# Patient Record
Sex: MALE | Race: WHITE | NOT HISPANIC OR LATINO | Employment: UNEMPLOYED | URBAN - METROPOLITAN AREA
[De-identification: names, ages, dates, MRNs, and addresses within clinical notes are randomized per-mention and may not be internally consistent; named-entity substitution may affect disease eponyms.]

---

## 2017-10-09 ENCOUNTER — APPOINTMENT (EMERGENCY)
Dept: RADIOLOGY | Facility: HOSPITAL | Age: 52
End: 2017-10-09
Payer: COMMERCIAL

## 2017-10-09 ENCOUNTER — HOSPITAL ENCOUNTER (INPATIENT)
Dept: RADIOLOGY | Facility: HOSPITAL | Age: 52
LOS: 3 days | Discharge: HOME | DRG: 045 | End: 2017-10-12
Attending: RADIOLOGY | Admitting: INTERNAL MEDICINE
Payer: COMMERCIAL

## 2017-10-09 ENCOUNTER — ANESTHESIA (OUTPATIENT)
Dept: RADIOLOGY | Facility: HOSPITAL | Age: 52
End: 2017-10-09

## 2017-10-09 ENCOUNTER — HOSPITAL ENCOUNTER (EMERGENCY)
Facility: HOSPITAL | Age: 52
End: 2017-10-09
Attending: EMERGENCY MEDICINE | Admitting: EMERGENCY MEDICINE
Payer: COMMERCIAL

## 2017-10-09 ENCOUNTER — APPOINTMENT (EMERGENCY)
Dept: CT IMAGING | Facility: HOSPITAL | Age: 52
End: 2017-10-09
Payer: COMMERCIAL

## 2017-10-09 ENCOUNTER — ANESTHESIA EVENT (OUTPATIENT)
Dept: RADIOLOGY | Facility: HOSPITAL | Age: 52
End: 2017-10-09

## 2017-10-09 VITALS
SYSTOLIC BLOOD PRESSURE: 157 MMHG | WEIGHT: 218.26 LBS | DIASTOLIC BLOOD PRESSURE: 90 MMHG | OXYGEN SATURATION: 96 % | RESPIRATION RATE: 16 BRPM | HEART RATE: 82 BPM

## 2017-10-09 DIAGNOSIS — I51.9 SYSTOLIC DYSFUNCTION: ICD-10-CM

## 2017-10-09 DIAGNOSIS — I63.419 CEREBROVASCULAR ACCIDENT (CVA) DUE TO EMBOLISM OF MIDDLE CEREBRAL ARTERY, UNSPECIFIED BLOOD VESSEL LATERALITY (HCC): ICD-10-CM

## 2017-10-09 DIAGNOSIS — R47.01 EXPRESSIVE APHASIA: ICD-10-CM

## 2017-10-09 DIAGNOSIS — I10 HYPERTENSION: ICD-10-CM

## 2017-10-09 DIAGNOSIS — I63.419: Primary | ICD-10-CM

## 2017-10-09 DIAGNOSIS — I63.9 STROKE (CEREBRUM) (HCC): Primary | ICD-10-CM

## 2017-10-09 DIAGNOSIS — Z95.1 S/P CABG (CORONARY ARTERY BYPASS GRAFT): Chronic | ICD-10-CM

## 2017-10-09 LAB
ABO GROUP BLD: NORMAL
ALBUMIN SERPL BCP-MCNC: 3.9 G/DL (ref 3.5–5)
ALP SERPL-CCNC: 114 U/L (ref 46–116)
ALT SERPL W P-5'-P-CCNC: 44 U/L (ref 12–78)
ANION GAP BLD CALC-SCNC: 16 MMOL/L (ref 4–13)
ANION GAP SERPL CALCULATED.3IONS-SCNC: 7 MMOL/L (ref 4–13)
APTT PPP: 29 SECONDS (ref 23–35)
AST SERPL W P-5'-P-CCNC: 22 U/L (ref 5–45)
ATRIAL RATE: 106 BPM
ATRIAL RATE: 96 BPM
BILIRUB SERPL-MCNC: 0.3 MG/DL (ref 0.2–1)
BLD GP AB SCN SERPL QL: NEGATIVE
BUN BLD-MCNC: 16 MG/DL (ref 5–25)
BUN SERPL-MCNC: 16 MG/DL (ref 5–25)
CA-I BLD-SCNC: 1.14 MMOL/L (ref 1.12–1.32)
CALCIUM SERPL-MCNC: 8.7 MG/DL (ref 8.3–10.1)
CHLORIDE BLD-SCNC: 106 MMOL/L (ref 100–108)
CHLORIDE SERPL-SCNC: 105 MMOL/L (ref 100–108)
CO2 SERPL-SCNC: 27 MMOL/L (ref 21–32)
CREAT BLD-MCNC: 0.9 MG/DL (ref 0.6–1.3)
CREAT SERPL-MCNC: 0.86 MG/DL (ref 0.6–1.3)
ERYTHROCYTE [DISTWIDTH] IN BLOOD BY AUTOMATED COUNT: 14.3 % (ref 11.6–15.1)
GFR SERPL CREATININE-BSD FRML MDRD: 101 ML/MIN/1.73SQ M
GFR SERPL CREATININE-BSD FRML MDRD: 99 ML/MIN/1.73SQ M
GLUCOSE SERPL-MCNC: 113 MG/DL (ref 65–140)
GLUCOSE SERPL-MCNC: 127 MG/DL (ref 65–140)
HCT VFR BLD AUTO: 47.3 % (ref 36.5–49.3)
HCT VFR BLD CALC: 50 % (ref 36.5–49.3)
HGB BLD-MCNC: 15.6 G/DL (ref 12–17)
HGB BLDA-MCNC: 17 G/DL (ref 12–17)
INR PPP: 0.83 (ref 0.86–1.16)
MCH RBC QN AUTO: 28.5 PG (ref 26.8–34.3)
MCHC RBC AUTO-ENTMCNC: 33 G/DL (ref 31.4–37.4)
MCV RBC AUTO: 86 FL (ref 82–98)
P AXIS: 29 DEGREES
P AXIS: 37 DEGREES
PCO2 BLD: 25 MMOL/L (ref 21–32)
PLATELET # BLD AUTO: 204 THOUSANDS/UL (ref 149–390)
PMV BLD AUTO: 11.5 FL (ref 8.9–12.7)
POTASSIUM BLD-SCNC: 4.1 MMOL/L (ref 3.5–5.3)
POTASSIUM SERPL-SCNC: 4.2 MMOL/L (ref 3.5–5.3)
PR INTERVAL: 178 MS
PROT SERPL-MCNC: 7.4 G/DL (ref 6.4–8.2)
PROTHROMBIN TIME: 11.6 SECONDS (ref 12.1–14.4)
QRS AXIS: -17 DEGREES
QRS AXIS: -24 DEGREES
QRSD INTERVAL: 108 MS
QRSD INTERVAL: 118 MS
QT INTERVAL: 342 MS
QT INTERVAL: 366 MS
QTC INTERVAL: 443 MS
QTC INTERVAL: 457 MS
RBC # BLD AUTO: 5.48 MILLION/UL (ref 3.88–5.62)
RH BLD: POSITIVE
SODIUM BLD-SCNC: 142 MMOL/L (ref 136–145)
SODIUM SERPL-SCNC: 139 MMOL/L (ref 136–145)
SPECIMEN EXPIRATION DATE: NORMAL
SPECIMEN SOURCE: ABNORMAL
SPECIMEN SOURCE: NORMAL
T WAVE AXIS: 74 DEGREES
T WAVE AXIS: 77 DEGREES
TROPONIN I BLD-MCNC: 0.04 NG/ML (ref 0–0.08)
TROPONIN I SERPL-MCNC: 0.03 NG/ML
VENTRICULAR RATE: 101 BPM
VENTRICULAR RATE: 94 BPM
WBC # BLD AUTO: 11.84 THOUSAND/UL (ref 4.31–10.16)

## 2017-10-09 PROCEDURE — C1894 INTRO/SHEATH, NON-LASER: HCPCS

## 2017-10-09 PROCEDURE — 80053 COMPREHEN METABOLIC PANEL: CPT | Performed by: EMERGENCY MEDICINE

## 2017-10-09 PROCEDURE — 85730 THROMBOPLASTIN TIME PARTIAL: CPT | Performed by: EMERGENCY MEDICINE

## 2017-10-09 PROCEDURE — 76937 US GUIDE VASCULAR ACCESS: CPT

## 2017-10-09 PROCEDURE — C1769 GUIDE WIRE: HCPCS

## 2017-10-09 PROCEDURE — 70450 CT HEAD/BRAIN W/O DYE: CPT

## 2017-10-09 PROCEDURE — 80047 BASIC METABLC PNL IONIZED CA: CPT

## 2017-10-09 PROCEDURE — 86900 BLOOD TYPING SEROLOGIC ABO: CPT | Performed by: EMERGENCY MEDICINE

## 2017-10-09 PROCEDURE — 93005 ELECTROCARDIOGRAM TRACING: CPT | Performed by: EMERGENCY MEDICINE

## 2017-10-09 PROCEDURE — B31RYZZ FLUOROSCOPY OF INTRACRANIAL ARTERIES USING OTHER CONTRAST: ICD-10-PCS | Performed by: RADIOLOGY

## 2017-10-09 PROCEDURE — 84484 ASSAY OF TROPONIN QUANT: CPT | Performed by: EMERGENCY MEDICINE

## 2017-10-09 PROCEDURE — 86850 RBC ANTIBODY SCREEN: CPT | Performed by: EMERGENCY MEDICINE

## 2017-10-09 PROCEDURE — 36415 COLL VENOUS BLD VENIPUNCTURE: CPT | Performed by: EMERGENCY MEDICINE

## 2017-10-09 PROCEDURE — 70498 CT ANGIOGRAPHY NECK: CPT

## 2017-10-09 PROCEDURE — 96374 THER/PROPH/DIAG INJ IV PUSH: CPT

## 2017-10-09 PROCEDURE — C2617 STENT, NON-COR, TEM W/O DEL: HCPCS

## 2017-10-09 PROCEDURE — 70496 CT ANGIOGRAPHY HEAD: CPT

## 2017-10-09 PROCEDURE — 84484 ASSAY OF TROPONIN QUANT: CPT

## 2017-10-09 PROCEDURE — 93005 ELECTROCARDIOGRAM TRACING: CPT

## 2017-10-09 PROCEDURE — 85610 PROTHROMBIN TIME: CPT | Performed by: EMERGENCY MEDICINE

## 2017-10-09 PROCEDURE — 86901 BLOOD TYPING SEROLOGIC RH(D): CPT | Performed by: EMERGENCY MEDICINE

## 2017-10-09 PROCEDURE — 85027 COMPLETE CBC AUTOMATED: CPT | Performed by: EMERGENCY MEDICINE

## 2017-10-09 PROCEDURE — 85014 HEMATOCRIT: CPT

## 2017-10-09 PROCEDURE — 99285 EMERGENCY DEPT VISIT HI MDM: CPT

## 2017-10-09 PROCEDURE — 36224 PLACE CATH CAROTD ART: CPT

## 2017-10-09 PROCEDURE — 71010 HB CHEST X-RAY 1 VIEW FRONTAL (PORTABLE): CPT

## 2017-10-09 RX ORDER — LABETALOL HYDROCHLORIDE 5 MG/ML
20 INJECTION, SOLUTION INTRAVENOUS ONCE
Status: COMPLETED | OUTPATIENT
Start: 2017-10-09 | End: 2017-10-09

## 2017-10-09 RX ORDER — PROPOFOL 10 MG/ML
INJECTION, EMULSION INTRAVENOUS AS NEEDED
Status: DISCONTINUED | OUTPATIENT
Start: 2017-10-09 | End: 2017-10-09 | Stop reason: SURG

## 2017-10-09 RX ORDER — ATORVASTATIN CALCIUM 40 MG/1
40 TABLET, FILM COATED ORAL EVERY EVENING
Status: DISCONTINUED | OUTPATIENT
Start: 2017-10-09 | End: 2017-10-10

## 2017-10-09 RX ORDER — NICOTINE 21 MG/24HR
1 PATCH, TRANSDERMAL 24 HOURS TRANSDERMAL DAILY
Status: DISCONTINUED | OUTPATIENT
Start: 2017-10-10 | End: 2017-10-12 | Stop reason: HOSPADM

## 2017-10-09 RX ORDER — SODIUM CHLORIDE, SODIUM LACTATE, POTASSIUM CHLORIDE, CALCIUM CHLORIDE 600; 310; 30; 20 MG/100ML; MG/100ML; MG/100ML; MG/100ML
INJECTION, SOLUTION INTRAVENOUS CONTINUOUS PRN
Status: DISCONTINUED | OUTPATIENT
Start: 2017-10-09 | End: 2017-10-09 | Stop reason: SURG

## 2017-10-09 RX ORDER — CHLORHEXIDINE GLUCONATE 0.12 MG/ML
15 RINSE ORAL EVERY 12 HOURS SCHEDULED
Status: DISCONTINUED | OUTPATIENT
Start: 2017-10-09 | End: 2017-10-12 | Stop reason: HOSPADM

## 2017-10-09 RX ADMIN — SODIUM CHLORIDE, SODIUM LACTATE, POTASSIUM CHLORIDE, AND CALCIUM CHLORIDE: .6; .31; .03; .02 INJECTION, SOLUTION INTRAVENOUS at 19:47

## 2017-10-09 RX ADMIN — LABETALOL 20 MG/4 ML (5 MG/ML) INTRAVENOUS SYRINGE 20 MG: at 18:00

## 2017-10-09 RX ADMIN — PROPOFOL 30 MG: 10 INJECTION, EMULSION INTRAVENOUS at 20:13

## 2017-10-09 RX ADMIN — PROPOFOL 30 MG: 10 INJECTION, EMULSION INTRAVENOUS at 20:15

## 2017-10-09 RX ADMIN — ALTEPLASE 80.2 MG: KIT at 18:25

## 2017-10-09 RX ADMIN — PROPOFOL 30 MG: 10 INJECTION, EMULSION INTRAVENOUS at 20:44

## 2017-10-09 NOTE — ED PROVIDER NOTES
History  Chief Complaint   Patient presents with   [address]     pt dropped his I pad around [phone]  Pt was not making sense to his wife  The patient is a 82-year-old male who presents 1 hour after developing slurred speech as well as mixing of his words  The wife notes that he also seemed confused  No chest pain, shortness of breath, fevers, chills, sweats  He has mild left-sided facial droop  No arm or leg weakness  No ataxia  He is unable to name certain objects when I point them out, he also has trouble following commands although he is alert  I suspect that his inability to follow commands is secondary to a receptive aphasia because he keeps saying that he does not understand that he is confused  I spoke with Interventional Radiology, critical care as well as Neurology, gave tPA, no contraindications, will transfer to [place] for interventional radiology  None       Past Medical History:   Diagnosis Date    Cardiac disease     Hx of CABG        History reviewed  No pertinent surgical history  History reviewed  No pertinent family history  I have reviewed and agree with the history as documented  Social History   Substance Use Topics    Smoking status: Current Every Day Smoker    Smokeless tobacco: Current User    Alcohol use Yes        Review of Systems   Constitutional: Negative for chills and fever  HENT: Negative for ear pain and hearing loss  Eyes: Positive for visual disturbance  Respiratory: Negative for chest tightness and shortness of breath  Cardiovascular: Negative for chest pain and leg swelling  Gastrointestinal: Negative for abdominal pain, diarrhea and nausea  Genitourinary: Negative for dysuria and hematuria  Musculoskeletal: Negative for joint swelling and neck stiffness  Skin: Negative for rash  Neurological: Positive for facial asymmetry and speech difficulty  Negative for seizures and headaches     Psychiatric/Behavioral: Negative for hallucinations and suicidal ideas  All other systems reviewed and are negative  Physical Exam  ED Triage Vitals [10/09/17 1744]   Temp Pulse Respirations Blood Pressure SpO2   -- 101 18 (!) 174/100 98 %      Temp src Heart Rate Source Patient Position - Orthostatic VS BP Location FiO2 (%)   -- Monitor Sitting Right arm --      Pain Score       No Pain           Physical Exam   Constitutional: He appears well-developed and well-nourished  HENT:   Head: Normocephalic and atraumatic  Eyes: EOM are normal  Pupils are equal, round, and reactive to light  Neck: Normal range of motion  Neck supple  Cardiovascular: Normal rate, regular rhythm and normal heart sounds  No murmur heard  Pulmonary/Chest: Effort normal and breath sounds normal  No respiratory distress  He has no wheezes  Abdominal: Soft  Bowel sounds are normal  He exhibits no distension  There is no tenderness  Musculoskeletal: Normal range of motion  He exhibits no edema or tenderness  Neurological: He is alert  Coordination normal    Left-sided facial droop, slight, he also has a expressive and receptive aphasia  Possible visual field defect although the patient is unable to comply with my testing to determine this completely  Skin: Skin is warm and dry  He is not diaphoretic  No erythema  Psychiatric: He has a normal mood and affect  His behavior is normal    Nursing note and vitals reviewed        ED Medications  Medications   labetalol (NORMODYNE) injection 20 mg (20 mg Intravenous Given 10/9/17 1800)   alteplase (ACTIVASE) bolus 8 9 mg (8 9 mg Intravenous Given 10/9/17 1820)     Followed by   alteplase (ACTIVASE) infusion 80 2 mg (80 2 mg Intravenous New Bag 10/9/17 1825)       Diagnostic Studies  Labs Reviewed   CBC AND PLATELET - Abnormal        Result Value Ref Range Status    WBC 11 84 (*) 4 31 - 10 16 Thousand/uL Final    RBC 5 48  3 88 - 5 62 Million/uL Final    Hemoglobin 15 6  12 0 - 17 0 g/dL Final    Hematocrit 47 3  36 5 - 49 3 % Final    MCV 86  82 - 98 fL Final    MCH 28 5  26 8 - 34 3 pg Final    MCHC 33 0  31 4 - 37 4 g/dL Final    RDW 14 3  11 6 - 15 1 % Final    Platelets 459  178 - 390 Thousands/uL Final    MPV 11 5  8 9 - 12 7 fL Final   PROTIME-INR - Abnormal     Protime 11 6 (*) 12 1 - 14 4 seconds Final    INR 0 83 (*) 0 86 - 1 16 Final   POCT CHEM 8+ - Abnormal     Anion Gap, Istat 16 (*) 4 - 13 mmol/L Final    Hct, i-STAT 50 (*) 36 5 - 49 3 % Final    SODIUM, I-STAT 142  136 - 145 mmol/l Final    Potassium, i-STAT 4 1  3 5 - 5 3 mmol/L Final    Chloride, istat 106  100 - 108 mmol/L Final    CO2, i-STAT 25  21 - 32 mmol/L Final    Calcium, Ionized i-STAT 1 14  1 12 - 1 32 mmol/L Final    BUN, I-STAT 16  5 - 25 mg/dl Final    Creatinine, i-STAT 0 9  0 6 - 1 3 mg/dl Final    eGFR 99  ml/min/1 73sq m Final    Glucose, i-STAT 113  65 - 140 mg/dl Final    Hgb, i-STAT 17 0  12 0 - 17 0 g/dl Final    Specimen Type VENOUS   Final   APTT - Normal    PTT 29  23 - 35 seconds Final    Narrative: Therapeutic Heparin Range = 60-90 seconds   TROPONIN I - Normal    Troponin I 0 03  <=0 04 ng/mL Final    Narrative:     [employer] Chemistry analyzer 99% cutoff is > 0 04 ng/mL in network labs    o cTnI 99% cutoff is useful only when applied to patients in the clinical setting of myocardial ischemia  o cTnI 99% cutoff should be interpreted in the context of clinical history, ECG findings and possibly cardiac imaging to establish correct diagnosis  o cTnI 99% cutoff may be suggestive but clearly not indicative of a coronary event without the clinical setting of myocardial ischemia     POCT TROPONIN - Normal    POC Troponin I 0 04  0 00 - 0 08 ng/ml Final    Specimen Type VENOUS   Final    Narrative:     [employer] i-Stat handheld analyzer 99% cutoff is > 0 08ng/mL in [facility] Dep[facility]    o cTnI 99% cutoff is useful only when applied to patients in the clinical setting of myocardial ischemia  o cTnI 99% cutoff should be interpreted in the context of clinical history, ECG findings and possibly cardiac imaging to establish correct diagnosis  o cTnI 99% cutoff may be suggestive but clearly not indicative of a coronary event without the clinical setting of myocardial ischemia  COMPREHENSIVE METABOLIC PANEL    Sodium 711  136 - 145 mmol/L Final    Potassium 4 2  3 5 - 5 3 mmol/L Final    Chloride 105  100 - 108 mmol/L Final    CO2 27  21 - 32 mmol/L Final    Anion Gap 7  4 - 13 mmol/L Final    BUN 16  5 - 25 mg/dL Final    Creatinine 0 86  0 60 - 1 30 mg/dL Final    Comment: Standardized to IDMS reference method    Glucose 127  65 - 140 mg/dL Final    Comment:   If the patient is fasting, the ADA then defines impaired fasting glucose as > 100 mg/dL and diabetes as > or equal to 123 mg/dL  Specimen collection should occur prior to Sulfasalazine administration due to the potential for falsely depressed results  Specimen collection should occur prior to Sulfapyridine administration due to the potential for falsely elevated results  Calcium 8 7  8 3 - 10 1 mg/dL Final    AST 22  5 - 45 U/L Final    Comment:   Specimen collection should occur prior to Sulfasalazine administration due to the potential for falsely depressed results  ALT 44  12 - 78 U/L Final    Comment:   Specimen collection should occur prior to Sulfasalazine administration due to the potential for falsely depressed results  Alkaline Phosphatase 114  46 - 116 U/L Final    Total Protein 7 4  6 4 - 8 2 g/dL Final    Albumin 3 9  3 5 - 5 0 g/dL Final    Total Bilirubin 0 30  0 20 - 1 00 mg/dL Final    eGFR 101  ml/min/1 73sq m Final    Narrative:     National Kidney Disease Education Program recommendations are as follows:  GFR calculation is accurate only with a steady state creatinine  Chronic Kidney disease less than 60 ml/min/1 73 sq  meters  Kidney failure less than 15 ml/min/1 73 sq  meters         CTA stroke alert (head/neck)   Final Result      Loss of contrast opacification, a short segment, within the proximal portion of the superior division of the left MCA (series 2 image 166)  Distal reconstitution is noted  Remainder of the intracranial vasculature appears intact with signs atherosclerotic disease  No hemodynamically significant stenosis within the cervical carotids by NASCET criteria  Patent bilateral cervical vertebral arteries  ##[id number]##imslh         Workstation performed: [id number]         CT stroke alert brain   Final Result      No acute intracranial hemorrhage or signs of a developing infarct  However, hyperdensity within the left insula may reflect atherosclerotic disease or thrombotic left M2 vessel  Needs correlation with CTA        Findings were directly discussed with Samir Cobian on 10/9/2017 5:55 PM          Workstation performed: [id number]         XR stroke alert portable chest    (Results Pending)       Procedures  CriticalCare Time  Performed by: Shy Dey  Authorized by: Shy Dey     Critical care provider statement:     Critical care time (minutes):  35    Critical care time was exclusive of:  Separately billable procedures and treating other patients and teaching time    Critical care was necessary to treat or prevent imminent or life-threatening deterioration of the following conditions:  CNS failure or compromise    Critical care was time spent personally by me on the following activities:  Blood draw for specimens, obtaining history from patient or surrogate, development of treatment plan with patient or surrogate, evaluation of patient's response to treatment, examination of patient, re-evaluation of patient's condition, ordering and review of laboratory studies, ordering and performing treatments and interventions, review of old charts, ordering and review of radiographic studies and discussions with consultants          Phone Contacts  ED Phone Contact    ED Course  ED Course as of Oct 09 2040   Mon Oct 09, 2017   1753 Speaking with teena    [phone] with Teena who spoke with Patricio, Likely symptomatic M2 occlusion, transfer to [place] and transfer to   On the phone with PACS right now  patricio  [phone]    1147 Spoke with Critical Care - will transfer to [place]              NIH Stroke Scale    Flowsheet Row Most Recent Value   Level of Consciousness (1a )  0 Filed at: 10/09/2017 1829   LOC Questions (1b )  0 Filed at: 10/09/2017 1829   LOC Commands (1c )  1 Filed at: 10/09/2017 1829   Best Gaze (2 )  0 Filed at: 10/09/2017 1829   Visual (3 )  1 Filed at: 10/09/2017 1829   Facial Palsy (4 )  1 Filed at: 10/09/2017 1829   Motor Arm, Left (5a )  0 Filed at: 10/09/2017 1829   Motor Arm, Right (5b )  0 Filed at: 10/09/2017 1829   Motor Leg, Left (6a )  0 Filed at: 10/09/2017 1829   Motor Leg, Right (6b )  0 Filed at: 10/09/2017 1829   Limb Ataxia (7 )  0 Filed at: 10/09/2017 1829   Sensory (8 )  0 Filed at: 10/09/2017 1829   Best Language (9 )  1 Filed at: 10/09/2017 1829   Dysarthria (10 )  1 Filed at: 10/09/2017 1829   Extinction and Inattention (11 ) (Formerly Neglect)  0 Filed at: 10/09/2017 1829   Total  5 Filed at: 10/09/2017 [address] Time    Disposition  Final diagnoses:   Stroke (cerebrum) ([address] )   Expressive aphasia     ED Disposition     ED Disposition Condition Comment    Transfer to Another [address],[facility] should be transferred out to [address] [facility]        MD Documentation    [facility] Most Recent Value   Patient Condition  The patient has been stabilized such that within reasonable medical probability, no material deterioration of the patient condition or the condition of the unborn child(shagufta) is likely to result from the transfer   Reason for Transfer  Level of Care needed not available at this facility [facility] intervention]   Benefits of Transfer  Specialized equipment and/or services available at the receiving facility (Include comment)________________________   Risks of Transfer  Potential for delay in receiving treatment, Potential deterioration of medical condition, Loss of IV, Possible worsening of condition or death during transfer, Increased discomfort during transfer   Accepting Physician  Venessa Guardado Name, [address] by (Company and Unit #)  Amanda He   Sending MD Mondragon Proud   Provider Certification  General risk, such as traffic hazards, adverse weather conditions, rough terrain or turbulence, possible failure of equipment (including vehicle or aircraft), or consequences of actions of persons outside the control of the transport personnel, Unanticipated needs of medical equipment and personnel during transport, Risk of worsening condition, The possibility of a transport vehicle being unavailable      RN Documentation    Flowsheet Row Most [address] Name, [address] [facility] Assignment  IR, then to ICU   Transported by [employer] and Unit #)  [facility]      Follow-up Information    None       There are no discharge medications for this patient  No discharge procedures on file      ED Provider  Electronically Signed by         Claude Lindau, MD  10/09/17 2176

## 2017-10-09 NOTE — EMTALA/ACUTE CARE TRANSFER
[address] [address] [address]  Dept: [phone]      EMTALA TRANSFER CONSENT    NAME Mariea Frankel                                         1965                              MRN [id number]    I have been informed of my rights regarding examination, treatment, and transfer   by Dr Yessi Miller MD    Benefits: Specialized equipment and/or services available at the receiving facility (Include comment)________________________    Risks: Potential for delay in receiving treatment, Potential deterioration of medical condition, Loss of IV, Possible worsening of condition or death during transfer, Increased discomfort during transfer      Transfer Request   I acknowledge that my medical condition has been evaluated and explained to me by the emergency department physician or other qualified medical person and/or my attending physician who has recommended and offered to me further medical examination and treatment  I understand the Hospital's obligation with respect to the treatment and stabilization of my emergency medical condition  I nevertheless request to be transferred  I release the Hospital, the doctor, and any other persons caring for me from all responsibility or liability for any injury or ill effects that may result from my transfer and agree to accept all responsibility for the consequences of my choice to transfer, rather than receive stabilizing treatment at the Hospital  I understand that because the transfer is my request, my insurance may not provide reimbursement for the services  The Hospital will assist and direct me and my family in how to make arrangements for transfer, but the hospital is not liable for any fees charged by the transport service    In spite of this understanding, I refuse to consent to further medical examination and treatment which has been offered to me, and request transfer to [address] Name, [address] [facility] [address]  I authorize the performance of emergency medical procedures and treatments upon me in both transit and upon arrival at the receiving facility  Additionally, I authorize the release of any and all medical records to the receiving facility and request they be transported with me, if possible  I authorize the performance of emergency medical procedures and treatments upon me in both transit and upon arrival at the receiving facility  Additionally, I authorize the release of any and all medical records to the receiving facility and request they be transported with me, if possible  I understand that the safest mode of transportation during a medical emergency is an ambulance and that the Hospital advocates the use of this mode of transport  Risks of traveling to the receiving facility by car, including absence of medical control, life sustaining equipment, such as oxygen, and medical personnel has been explained to me and I fully understand them  (MOUSTAPHA CORRECT BOX BELOW)  [  ]  I consent to the stated transfer and to be transported by ambulance/helicopter  [  ]  I consent to the stated transfer, but refuse transportation by ambulance and accept full responsibility for my transportation by car  I understand the risks of non-ambulance transfers and I exonerate the Hospital and its staff from any deterioration in my condition that results from this refusal     X___________________________________________    DATE  10/09/17  TIME________  Signature of patient or legally responsible individual signing on patient behalf           RELATIONSHIP TO PATIENT_________________________          Provider Certification    NAME Fanta Alvares                                        [facility] 1965                              MRN [id number]    A medical screening exam was performed on the above named patient    Based on the examination:    Condition Necessitating Transfer There were no encounter diagnoses  Patient Condition: The patient has been stabilized such that within reasonable medical probability, no material deterioration of the patient condition or the condition of the unborn child(shagufta) is likely to result from the transfer    Reason for Transfer: Level of Care needed not available at this facility (Neuro intervention)    Transfer Requirements: Facility [employer] available and qualified personnel available for treatment as acknowledged by    · Agreed to accept transfer and to provide appropriate medical treatment as acknowledged by       [employer]  · Appropriate medical records of the examination and treatment of the patient are provided at the time of transfer   [address] [facility] _______  · Transfer will be performed by qualified personnel from    and appropriate transfer equipment as required, including the use of necessary and appropriate life support measures      Provider Certification: I have examined the patient and explained the following risks and benefits of being transferred/refusing transfer to the patient/family:  General risk, such as traffic hazards, adverse weather conditions, rough terrain or turbulence, possible failure of equipment (including vehicle or aircraft), or consequences of actions of persons outside the control of the transport personnel, Unanticipated needs of medical equipment and personnel during transport, Risk of worsening condition, The possibility of a transport vehicle being unavailable      Based on these reasonable risks and benefits to the patient and/or the unborn child(shagufta), and based upon the information available at the time of the patients examination, I certify that the medical benefits reasonably to be expected from the provision of appropriate medical treatments at another medical facility outweigh the increasing risks, if any, to the individuals medical condition, and in the case of labor to the unborn child, from effecting the transfer      X____________________________________________ DATE 10/09/17        TIME_______      ORIGINAL - SEND TO MEDICAL RECORDS   COPY - SEND WITH PATIENT DURING TRANSFER

## 2017-10-09 NOTE — ANESTHESIA PREPROCEDURE EVALUATION
Review of Systems/Medical History  Patient summary reviewed    No history of anesthetic complications     Cardiovascular  Negative cardio ROS Exercise tolerance: good,  CAD, , History of CABG, Aortic disease (hx endovascular stent graft),    Pulmonary  Negative pulmonary ROS Smoker cigarette smoker more than 10 packs per year , ,        GI/Hepatic  Negative GI/hepatic ROS          Negative  ROS        Endo/Other  Negative endo/other ROS      GYN  Negative gynecology ROS          Hematology  Negative hematology ROS      Musculoskeletal  Negative musculoskeletal ROS        Neurology  Negative neurology ROS   CVA , acute,    Psychology   Negative psychology ROS            Physical Exam    Airway    Mallampati score: II  TM Distance: >3 FB  Neck ROM: full     Dental   No notable dental hx     Cardiovascular  Comment: Negative ROS,     Pulmonary      Other Findings        Anesthesia Plan  ASA Score- 3 Emergent      Anesthesia Type- IV sedation with anesthesia  Comment: I have seen the patient and reviewed the history  Patient to receive IV sedation with full ASA monitors  Risks discussed with the patient, consent signed          Induction- intravenous      Informed Consent  Anesthetic plan and risks discussed with patient

## 2017-10-09 NOTE — PROGRESS NOTES
Progress Note - Neurology   Magaly Woodward 50 y o  male MRN: [id number]  Unit/Bed#: ED 32 Encounter: [id number]    CVA Alert:  61-year-old male with onset what appears to be if Wernickes type aphasia at approximately 5:00 p m  is no clear sensory or motor deficits on exam, nor does there appear to be any gross neglect syndrome as per discussion with ED physician   - NIHSS 6  - CVA alert called 17:47  - neuro response 17:48    Initial CT head was unremarkable, CTA demonstrated diminished contrast opacification of at least one M2 branch on the left  - initial blood pressure was 196/99, which quickly dropped with the combination of labetalol and nicardipine  - decision was made to give IV tPA  - reviewed CTA with Dr Rita Degroot who was amenable to [facility] transfer for potential endovascular intervention   - goal SBP less than 185   - additional neurologic recommendations to follow

## 2017-10-10 ENCOUNTER — APPOINTMENT (INPATIENT)
Dept: NON INVASIVE DIAGNOSTICS | Facility: HOSPITAL | Age: 52
DRG: 045 | End: 2017-10-10
Payer: COMMERCIAL

## 2017-10-10 ENCOUNTER — GENERIC CONVERSION - ENCOUNTER (OUTPATIENT)
Dept: OTHER | Facility: OTHER | Age: 52
End: 2017-10-10

## 2017-10-10 ENCOUNTER — APPOINTMENT (INPATIENT)
Dept: RADIOLOGY | Facility: HOSPITAL | Age: 52
DRG: 045 | End: 2017-10-10
Payer: COMMERCIAL

## 2017-10-10 PROBLEM — R41.0 CONFUSION: Status: ACTIVE | Noted: 2017-10-10

## 2017-10-10 PROBLEM — I10 HYPERTENSION: Status: ACTIVE | Noted: 2017-10-10

## 2017-10-10 PROBLEM — R41.0 CONFUSION: Status: RESOLVED | Noted: 2017-10-10 | Resolved: 2017-10-10

## 2017-10-10 PROBLEM — I25.10 CAD (CORONARY ARTERY DISEASE): Status: ACTIVE | Noted: 2017-10-10

## 2017-10-10 PROBLEM — Z95.1 S/P CABG (CORONARY ARTERY BYPASS GRAFT): Chronic | Status: ACTIVE | Noted: 2017-10-10

## 2017-10-10 LAB
ALBUMIN SERPL BCP-MCNC: 3.4 G/DL (ref 3.5–5)
ALP SERPL-CCNC: 95 U/L (ref 46–116)
ALT SERPL W P-5'-P-CCNC: 33 U/L (ref 12–78)
ANION GAP SERPL CALCULATED.3IONS-SCNC: 7 MMOL/L (ref 4–13)
ANION GAP SERPL CALCULATED.3IONS-SCNC: 7 MMOL/L (ref 4–13)
APTT PPP: 30 SECONDS (ref 23–35)
AST SERPL W P-5'-P-CCNC: 16 U/L (ref 5–45)
ATRIAL RATE: 73 BPM
BASOPHILS # BLD AUTO: 0.03 THOUSANDS/ΜL (ref 0–0.1)
BASOPHILS NFR BLD AUTO: 0 % (ref 0–1)
BILIRUB SERPL-MCNC: 0.27 MG/DL (ref 0.2–1)
BUN SERPL-MCNC: 11 MG/DL (ref 5–25)
BUN SERPL-MCNC: 9 MG/DL (ref 5–25)
CALCIUM SERPL-MCNC: 8.1 MG/DL (ref 8.3–10.1)
CALCIUM SERPL-MCNC: 8.4 MG/DL (ref 8.3–10.1)
CHLORIDE SERPL-SCNC: 108 MMOL/L (ref 100–108)
CHLORIDE SERPL-SCNC: 109 MMOL/L (ref 100–108)
CHOLEST SERPL-MCNC: 216 MG/DL (ref 50–200)
CO2 SERPL-SCNC: 25 MMOL/L (ref 21–32)
CO2 SERPL-SCNC: 25 MMOL/L (ref 21–32)
CREAT SERPL-MCNC: 0.76 MG/DL (ref 0.6–1.3)
CREAT SERPL-MCNC: 0.78 MG/DL (ref 0.6–1.3)
EOSINOPHIL # BLD AUTO: 0.07 THOUSAND/ΜL (ref 0–0.61)
EOSINOPHIL NFR BLD AUTO: 1 % (ref 0–6)
ERYTHROCYTE [DISTWIDTH] IN BLOOD BY AUTOMATED COUNT: 14 % (ref 11.6–15.1)
ERYTHROCYTE [DISTWIDTH] IN BLOOD BY AUTOMATED COUNT: 14.1 % (ref 11.6–15.1)
GFR SERPL CREATININE-BSD FRML MDRD: 105 ML/MIN/1.73SQ M
GFR SERPL CREATININE-BSD FRML MDRD: 106 ML/MIN/1.73SQ M
GLUCOSE SERPL-MCNC: 112 MG/DL (ref 65–140)
GLUCOSE SERPL-MCNC: 123 MG/DL (ref 65–140)
HCT VFR BLD AUTO: 42.7 % (ref 36.5–49.3)
HCT VFR BLD AUTO: 44.6 % (ref 36.5–49.3)
HDLC SERPL-MCNC: 29 MG/DL (ref 40–60)
HGB BLD-MCNC: 14.8 G/DL (ref 12–17)
HGB BLD-MCNC: 14.9 G/DL (ref 12–17)
INR PPP: 1.03 (ref 0.86–1.16)
LDLC SERPL CALC-MCNC: 143 MG/DL (ref 0–100)
LYMPHOCYTES # BLD AUTO: 3.59 THOUSANDS/ΜL (ref 0.6–4.47)
LYMPHOCYTES NFR BLD AUTO: 27 % (ref 14–44)
MAGNESIUM SERPL-MCNC: 2.2 MG/DL (ref 1.6–2.6)
MAGNESIUM SERPL-MCNC: 2.3 MG/DL (ref 1.6–2.6)
MCH RBC QN AUTO: 29.1 PG (ref 26.8–34.3)
MCH RBC QN AUTO: 29.9 PG (ref 26.8–34.3)
MCHC RBC AUTO-ENTMCNC: 33.2 G/DL (ref 31.4–37.4)
MCHC RBC AUTO-ENTMCNC: 34.9 G/DL (ref 31.4–37.4)
MCV RBC AUTO: 86 FL (ref 82–98)
MCV RBC AUTO: 88 FL (ref 82–98)
MONOCYTES # BLD AUTO: 1.02 THOUSAND/ΜL (ref 0.17–1.22)
MONOCYTES NFR BLD AUTO: 8 % (ref 4–12)
NEUTROPHILS # BLD AUTO: 8.35 THOUSANDS/ΜL (ref 1.85–7.62)
NEUTS SEG NFR BLD AUTO: 64 % (ref 43–75)
NRBC BLD AUTO-RTO: 0 /100 WBCS
P AXIS: 28 DEGREES
PHOSPHATE SERPL-MCNC: 2.8 MG/DL (ref 2.7–4.5)
PLATELET # BLD AUTO: 194 THOUSANDS/UL (ref 149–390)
PLATELET # BLD AUTO: 202 THOUSANDS/UL (ref 149–390)
PMV BLD AUTO: 11.3 FL (ref 8.9–12.7)
PMV BLD AUTO: 11.9 FL (ref 8.9–12.7)
POTASSIUM SERPL-SCNC: 3.8 MMOL/L (ref 3.5–5.3)
POTASSIUM SERPL-SCNC: 3.9 MMOL/L (ref 3.5–5.3)
PR INTERVAL: 196 MS
PROT SERPL-MCNC: 6.8 G/DL (ref 6.4–8.2)
PROTHROMBIN TIME: 13.5 SECONDS (ref 12.1–14.4)
QRS AXIS: -12 DEGREES
QRSD INTERVAL: 100 MS
QT INTERVAL: 413 MS
QTC INTERVAL: 455 MS
RBC # BLD AUTO: 4.99 MILLION/UL (ref 3.88–5.62)
RBC # BLD AUTO: 5.09 MILLION/UL (ref 3.88–5.62)
SODIUM SERPL-SCNC: 140 MMOL/L (ref 136–145)
SODIUM SERPL-SCNC: 141 MMOL/L (ref 136–145)
T WAVE AXIS: 87 DEGREES
TRIGL SERPL-MCNC: 220 MG/DL
VENTRICULAR RATE: 73 BPM
WBC # BLD AUTO: 12.56 THOUSAND/UL (ref 4.31–10.16)
WBC # BLD AUTO: 13.09 THOUSAND/UL (ref 4.31–10.16)

## 2017-10-10 PROCEDURE — 80061 LIPID PANEL: CPT | Performed by: EMERGENCY MEDICINE

## 2017-10-10 PROCEDURE — 70450 CT HEAD/BRAIN W/O DYE: CPT

## 2017-10-10 PROCEDURE — G8988 SELF CARE GOAL STATUS: HCPCS

## 2017-10-10 PROCEDURE — 85027 COMPLETE CBC AUTOMATED: CPT | Performed by: EMERGENCY MEDICINE

## 2017-10-10 PROCEDURE — 83735 ASSAY OF MAGNESIUM: CPT | Performed by: EMERGENCY MEDICINE

## 2017-10-10 PROCEDURE — 93306 TTE W/DOPPLER COMPLETE: CPT

## 2017-10-10 PROCEDURE — G8979 MOBILITY GOAL STATUS: HCPCS

## 2017-10-10 PROCEDURE — 92523 SPEECH SOUND LANG COMPREHEN: CPT

## 2017-10-10 PROCEDURE — G8978 MOBILITY CURRENT STATUS: HCPCS

## 2017-10-10 PROCEDURE — 80048 BASIC METABOLIC PNL TOTAL CA: CPT | Performed by: EMERGENCY MEDICINE

## 2017-10-10 PROCEDURE — 97163 PT EVAL HIGH COMPLEX 45 MIN: CPT

## 2017-10-10 PROCEDURE — 85025 COMPLETE CBC W/AUTO DIFF WBC: CPT | Performed by: EMERGENCY MEDICINE

## 2017-10-10 PROCEDURE — 85610 PROTHROMBIN TIME: CPT | Performed by: EMERGENCY MEDICINE

## 2017-10-10 PROCEDURE — 97166 OT EVAL MOD COMPLEX 45 MIN: CPT

## 2017-10-10 PROCEDURE — 85730 THROMBOPLASTIN TIME PARTIAL: CPT | Performed by: EMERGENCY MEDICINE

## 2017-10-10 PROCEDURE — 80053 COMPREHEN METABOLIC PANEL: CPT | Performed by: EMERGENCY MEDICINE

## 2017-10-10 PROCEDURE — G8987 SELF CARE CURRENT STATUS: HCPCS

## 2017-10-10 PROCEDURE — 84100 ASSAY OF PHOSPHORUS: CPT | Performed by: EMERGENCY MEDICINE

## 2017-10-10 RX ORDER — HEPARIN SODIUM 5000 [USP'U]/ML
5000 INJECTION, SOLUTION INTRAVENOUS; SUBCUTANEOUS EVERY 8 HOURS SCHEDULED
Status: DISCONTINUED | OUTPATIENT
Start: 2017-10-10 | End: 2017-10-12 | Stop reason: HOSPADM

## 2017-10-10 RX ORDER — ASPIRIN 81 MG/1
81 TABLET, CHEWABLE ORAL DAILY
Status: DISCONTINUED | OUTPATIENT
Start: 2017-10-11 | End: 2017-10-12 | Stop reason: HOSPADM

## 2017-10-10 RX ORDER — ATORVASTATIN CALCIUM 80 MG/1
80 TABLET, FILM COATED ORAL EVERY EVENING
Status: DISCONTINUED | OUTPATIENT
Start: 2017-10-10 | End: 2017-10-12 | Stop reason: HOSPADM

## 2017-10-10 RX ORDER — AMLODIPINE BESYLATE 5 MG/1
5 TABLET ORAL DAILY
Status: DISCONTINUED | OUTPATIENT
Start: 2017-10-10 | End: 2017-10-10

## 2017-10-10 RX ADMIN — IODIXANOL 24 ML: 320 INJECTION, SOLUTION INTRAVASCULAR at 11:11

## 2017-10-10 RX ADMIN — ATORVASTATIN CALCIUM 80 MG: 80 TABLET, FILM COATED ORAL at 18:48

## 2017-10-10 RX ADMIN — PERFLUTREN 0.8 ML/MIN: 6.52 INJECTION, SUSPENSION INTRAVENOUS at 12:19

## 2017-10-10 RX ADMIN — METOPROLOL TARTRATE 12.5 MG: 25 TABLET ORAL at 12:26

## 2017-10-10 RX ADMIN — NICOTINE 1 PATCH: 21 PATCH, EXTENDED RELEASE TRANSDERMAL at 09:37

## 2017-10-10 RX ADMIN — METOPROLOL TARTRATE 12.5 MG: 25 TABLET ORAL at 20:49

## 2017-10-10 NOTE — PHYSICAL THERAPY NOTE
Physical Therapy Evaluation    Patient's Name: Juliann Camejo    Admitting Diagnosis  Cerebrovascular accident (CVA) due to embolism of middle cerebral artery, unspecified blood vessel laterality ([facility] [address] ) [I63 [id number]]    Problem List  Patient Active Problem List   Diagnosis    Cerebrovascular accident (CVA) due to embolism of middle cerebral artery ([facility] [facility] )    Hypertension       Past Medical History  Past Medical History:   Diagnosis Date    Cardiac disease     Hx of CABG        Past Surgical History  History reviewed  No pertinent surgical history  10/10/17 1145   Note Type   Note type Eval only   Pain Assessment   Pain Assessment 0-10   Pain Score No Pain   Home Living   Type of Home House   Additional Comments Resides w/ wife, children in multilevel home  Normally indep self care, works FT   ambulates w/ out device  Prior Function   Level of [place] Independent with ADLs and functional mobility   Falls in the last 6 months 0   Restrictions/Precautions   Weight Bearing Precautions Per Order No   Other Precautions Cognitive; Chair Alarm; Bed Alarm;Multiple lines;Telemetry; Fall Risk   General   Family/Caregiver Present No   Cognition   Overall Cognitive Status Impaired   Arousal/Participation Responsive   Orientation Level Oriented X4   Memory Unable to assess   Following Commands Follows one step commands without difficulty   Comments pt w/ some word finding and receptive issues       RLE Assessment   RLE Assessment (strength 4/5)   LLE Assessment   LLE Assessment (strength 4/5)   Coordination   Movements are Fluid and Coordinated 0   Coordination and Movement Description mild ataxia   Bed Mobility   Sit to Supine 5  Supervision   Additional items Assist x 1   Transfers   Sit to Stand 4  Minimal assistance   Additional items Assist x 1   Stand to Sit 5  Supervision   Additional items Assist x 1   Ambulation/Elevation   Gait pattern (mild ataxia)   Gait Assistance 4  Minimal assist   Additional items Assist x 1   Assistive Device None   Distance 7'x1 from chair to bed- further distance deferred as about to have bedside echo   Balance   Static Sitting Good   Dynamic Sitting Fair   Static Standing Fair -   Dynamic Standing Fair -   Ambulatory Fair -   Endurance Deficit   Endurance Deficit Yes   Activity Tolerance   Activity Tolerance Treatment limited secondary to medical complications (Comment)   Nurse Made Aware yes   Assessment   Prognosis Good   Problem List Decreased strength;Decreased endurance; Impaired balance;Decreased mobility; Decreased coordination;Decreased cognition  (expressive and receptive issues)   Assessment Pt seen for high complexity physical therapy evaluation  Pt is a 51 y/o male w/ hostory/comorbidities of CAD, CABG who is now admitted as a transfer from [place] w  slurred speech, dysarthria, L facial droop, word finding issues, unsteady gait     Found to have acute L MCA- transferred here and had tpa w/ attempted endovascular clot retrieval   Given need for ICU monitoring w/ continued expressive and receptive issues, need for nursing 2:1 monitoring, note unstable clinical picture  PT consulted to assess mobility, d/c needs     Pt presents w/ decreased functional mob, standing balance, endurance, B LE strength and coordination, barriers at home  will benefit from skilled PT to correct for the above problems  Will follow for d/c needs- issues are more cog/communication in nature, anticipate home w/ outpatient therapies   Goals   Patient Goals none stated   STG Expiration Date 10/24/17   Short Term Goal #1 1-2 wks: Bed mob and transfers w/ indep, standing balance to good/normal dynamically, ambulate 200-300 ft w indep, ambulate 1 flight of stairs w/ indep   Treatment Day 0   Plan   Treatment/Interventions Functional transfer training;Elevations;LE strengthening/ROM; Therapeutic exercise; Endurance training;Patient/family training;Equipment eval/education; Bed mobility;Gait training   PT Frequency 5x/wk  (and 1x/weekend)   Recommendation   Recommendation (recommend home w/ home therapies but will follow)   PT - OK to Discharge (? home w/ outpatient therapies)   Modified Sofia Scale   Modified [place] Scale 4   Barthel Index   Feeding 10   Bathing 0   Grooming Score 5   Dressing Score 5   Bladder Score 10   Bowels Score 10   Toilet Use Score 5   Transfers (Bed/Chair) Score 10   Mobility (Level Surface) Score 0   Stairs Score 0   Barthel Index Score 55       Ian Longoria PT, DPT, CSRS

## 2017-10-10 NOTE — PLAN OF CARE
Problem: OCCUPATIONAL THERAPY ADULT  Goal: Performs self-care activities at highest level of function for planned discharge setting  See evaluation for individualized goals  Treatment Interventions: ADL retraining, Functional transfer training, UE strengthening/ROM, Cognitive reorientation, Endurance training, Patient/family training, Equipment evaluation/education, Compensatory technique education, Neuromuscular reeducation, Continued evaluation, Energy conservation, Activityengagement          See flowsheet documentation for full assessment, interventions and recommendations  Outcome: Progressing  Limitation: Decreased Safe judgement during ADL, Decreased cognition, Decreased high-level ADLs, Decreased ADL status  Prognosis: Good  Assessment: Pt is pleasant, employed full time 51 y/o male seen for OT eval s/p adm to [facility] s/p tfer frmo SLT, initially presented w/ altered mental status, slurred speech, dysarthria, confusion, mild L sided facial droop expressive/receptive aphasia, received TPA and was ultimately transferred to [facility] for IR intervention I e  Cerebral angio 10/9/2017, IR noted there to be a L MCA and 3 occlusion though no embolectomy was performed and pt was brought to the neuro ICU for monitoring, dx'd w/  L MCA CVA s/p TPA CTB: (-) acute, MRIB ordered and P, comorbidities include a h/o CABG, cardiac disease,   Pt was I w/  I/ADLS, drove, & required no use of DME PTA, pt lives w/ wife and 2 children in [place] in a [address] farm house w/ first floor bedroom/bathroom w/ 2nd floor full setup as well w/ 1 ANDERSON from the front vs 5 ANDERSON from the garage  Pt is currently demonstrating the following occupational deficits: limited 2* difficulty w/ word finding/language fluency, object agnosia, expressive<receptive aphasia, delayed processing, decreased attention/concentration to task, impulsive, minimizes impairments, decreased safety insight judgment and awareness into deficits   Pt requires overall SBA for ADLs/self care and SBA for fx'l mobility w/o DME/LOB  The following Occupational Performance Areas to address include: bathing/shower, toilet hygiene, dressing, medication management, socialization, health maintenance, functional mobility, community mobility, clothing management, cleaning, meal prep, money management, household maintenance, care of children, care of pets, job performance/volunteering and social participation  Pt scored overall 55/100 on the Barthel Index and 4/6 on the Modified Sofia Scale  Based on the aforementioned OT evaluation, functional performance deficits, and assessments, pt has been identified as a moderate complexity evaluation  Recommend outpt OT/SLP  Pt to continue to benefit from acute immediate OT services to address the following goals 3-5x/wk to  w/in 7-10 days:     OT Discharge Recommendation: Outpatient OT (potentially outpt SLP though defer to SLP)  OT - OK to Discharge: Yes (potentially outpt SLP though defer to SLP)    Thank you for the consult!  Please call if you have any questions: Radha Garcia, MLADONADO, OTR/L, C-GCM, CSRS  Ne[facility]

## 2017-10-10 NOTE — H&P
History and Physical - Critical Care   Jinny Hooks 50 y o  male MRN: [id number]  Unit/Bed#: ICU 13 Encounter: [id number]    Reason for Admission / Chief Complaint:  Ischemic Stroke    History of Present Illness:  Jinny Hooks is a 50 y o  male who presents as a transfer from [facility] for ischemic stroke  Per chart review patient acutely became altered at around 17:00 he started having slurred speech and some dysarthria, he seemed more confused according to the wife who witnessed the event  He had a mild left-sided facial droop on presentation to the ED  He had some receptive aphasia and was unable to name common objects  Upon consultation with Neurology, Interventional Radiology tPA was administered and patient was transferred to [place] for IR intervention  Per IR note there was left MCA and 3 occlusion though no embolectomy was performed  and patient was brought to the neuro ICU  for monitoring  On arrival patient is mildly confused, slow to answer but otherwise nonfocal   Neurologic exam, able to follow commands  History obtained from chart review and the patient  Past Medical History:  Past Medical History:   Diagnosis Date    Cardiac disease     Hx of CABG        Past Surgical History:  History reviewed  No pertinent surgical history  Past Family History:  History reviewed  No pertinent family history      Social History:  History   Smoking Status    Current Every Day Smoker   Smokeless Tobacco    Current User     History   Alcohol Use    Yes     History   Drug Use No     Marital Status: /Civil Union      Medications:  Current Facility-Administered Medications   Medication Dose Route Frequency    atorvastatin (LIPITOR) tablet 40 mg  40 mg Oral QPM    chlorhexidine (PERIDEX) 0 12 % oral rinse 15 mL  15 mL Swish & Spit Q12H [address]    nicotine (NICODERM CQ) 21 mg/24 hr TD 24 hr patch 1 patch  1 patch Transdermal Daily     Home medications:  Prior to Admission medications Not on File     Allergies: Allergies   Allergen Reactions    Penicillins        ROS:   Review of Systems   Neurological: Positive for headaches  Confusion, trouble speaking   All other systems reviewed and are negative  Vitals:  Vitals:    10/09/17 2230 10/09/17 2245 10/09/17 2300 10/09/17 2330   BP: 158/84 152/80 161/83 157/86   Pulse: 80 76 76 72   Resp:  17   Temp:       SpO2: 95% 95%       Temperature:   Temp (24hrs), Av 2 °F (37 3 °C), Min:99 2 °F (37 3 °C), Max:99 2 °F (37 3 °C)    Current Temperature: 99 2 °F (37 3 °C)    Weights: There is no height or weight on file to calculate BMI  Hemodynamic Monitoring:  N/A     Non-Invasive/Invasive Ventilation Settings:  Respiratory    Lab Data (Last 4 hours)    None         O2/Vent Data (Last 4 hours)    None              No results found for: PHART, SFW5VPQ, PO2ART, UEB6DVV, Z6OPCWEK, BEART, SOURCE  SpO2: SpO2: 95 %     Physical Exam:  Physical Exam   Constitutional: He is oriented to person, place, and time  He appears well-developed and well-nourished  No distress  HENT:   Head: Normocephalic and atraumatic  Right Ear: External ear normal    Left Ear: External ear normal    Nose: Nose normal    Mouth/Throat: Oropharynx is clear and moist    Eyes: EOM are normal  Pupils are equal, round, and reactive to light  Right eye exhibits no discharge  Left eye exhibits no discharge  Neck: Normal range of motion  Neck supple  No JVD present  No tracheal deviation present  No thyromegaly present  Cardiovascular: Normal rate, regular rhythm and normal heart sounds  Exam reveals no gallop and no friction rub  No murmur heard  Pulmonary/Chest: Effort normal and breath sounds normal  No stridor  No respiratory distress  He has no wheezes  He has no rales  He exhibits no tenderness  Abdominal: Soft  Bowel sounds are normal  He exhibits no distension  There is no tenderness  There is no rebound and no guarding     Musculoskeletal: Normal range of motion  He exhibits no edema or deformity  Neurological: He is alert and oriented to person, place, and time  No cranial nerve deficit  Coordination normal    MS 5/5 B/L upper and lower extremity, GCS 15   Skin: He is not diaphoretic  Nursing note and vitals reviewed  Labs:    Results from last 7 days  Lab Units 10/10/17  0058 10/09/17  1749 10/09/17  1747   WBC Thousand/uL 12 56*  --  11 84*   HEMOGLOBIN g/dL 14 9  --  15 6   I STAT HEMOGLOBIN g/dl  --  17 0  --    HEMATOCRIT % 42 7  --  47 3   PLATELETS Thousands/uL 194  --  204        Results from last 7 days  Lab Units 10/09/17  1749 10/09/17  1747   SODIUM mmol/L  --  139   POTASSIUM mmol/L  --  4 2   CHLORIDE mmol/L  --  105   CO2 mmol/L  --  27   BUN mg/dL  --  16   CREATININE mg/dL  --  0 86   CALCIUM mg/dL  --  8 7   TOTAL PROTEIN g/dL  --  7 4   BILIRUBIN TOTAL mg/dL  --  0 30   ALK PHOS U/L  --  114   ALT U/L  --  44   AST U/L  --  22   GLUCOSE RANDOM mg/dL  --  127   GLUCOSE, ISTAT mg/dl 113  --                 Results from last 7 days  Lab Units 10/10/17  0058 10/09/17  1747   INR  1 03 0 83*   PTT seconds 30 29           0  Lab Value Date/Time   TROPONINI 0 03 10/09/2017 1747       Imaging:   10/09- CT head - No acute intracranial hemorrhage or signs of a developing infarct  However, hyperdensity within the left insula may reflect atherosclerotic disease or thrombotic left M2 vessel  Needs correlation with CTA  10/09- CTA Head - Loss of contrast opacification, a short segment, within the proximal portion of the superior division of the left MCA (series 2 image 166)  Distal reconstitution is noted  Remainder of the intracranial vasculature appears intact with signs atherosclerotic disease  10/09 - IR Angiography I have personally reviewed pertinent reports  EKG:   Pending This was personally reviewed by myself     Micro:  No results found for: Mulu Escamilla Sharonda Del Angel    ______________________________________________________________________    Assessment:   Patient Active Problem List   Diagnosis    Cerebrovascular accident (CVA) due to embolism of middle cerebral artery ([facility] [address] )    Hypertension         Plan:   Patient Active Problem List   Diagnosis    Cerebrovascular accident (CVA) due to embolism of middle cerebral artery ([facility] [address] )    Hypertension        Neuro: Ischemic Stroke, S/p TPA, neuro checks per protocol  Will get repeat CT 24 hours post TPA, will get MRI, neurology consulted, start statin      CV: Hypertension - hold antihypertensive medication in setting of acute ischemic stroke      Pulm: No acute issue     GI: Passed Bedside swallow, diet started     : No acute issues, I/Os     F/E/N: Replete Electrolytes as needed, no IVF as patient has a diet     ID: No acute issues     Heme: No acute issues, check CBC in AM     Endo: No acute issues     Msk/Skin: No acute issues, PT/OT eval      Proph: No SQH as patient is s/p tPA, SCDs     Disposition: Admit to ICU        ______________________________________________________________________    VTE Pharmacologic Prophylaxis: s/p TPA  VTE Mechanical Prophylaxis: sequential compression device    Invasive lines and devices: Invasive Devices     Peripheral Intravenous Line            Peripheral IV 10/09/17 Left Antecubital less than 1 day    Peripheral IV 10/09/17 Right Antecubital less than 1 day                Code Status: Level 1 - Full Code  POA:    POLST:      Given critical illness, patient length of stay will require greater than two midnights  Portions of the record may have been created with voice recognition software  Occasional wrong word or "sound a like" substitutions may have occurred due to the inherent limitations of voice recognition software  Read the chart carefully and recognize, using context, where substitutions have occurred        Ivonne Pike MD

## 2017-10-10 NOTE — PROGRESS NOTES
Transfer Progress Note   - ICU Transfer to Step down/med  surg  -   Linard Pati 50 y o  male MRN: [id number]  Unit/Bed#: ICU 13 Encounter: [id number]      Code Status: Level 1 - Full Code    Date of ICU admission: 10/9/2017    Reason for ICU adm: Cerebrovascular accident (CVA) due to embolism of middle cerebral artery, unspecified blood vessel laterality ([facility] [address] ) [I63 419]    Active problems:   Patient Active Problem List   Diagnosis    Cerebrovascular accident (CVA) due to embolism of middle cerebral artery ([facility] [address] )    Hypertension    CAD (coronary artery disease)    S/P CABG (coronary artery bypass graft)       Consultants: neurology, neurosurgery    Summary of clinical course:  Patient is 50years old male with PMH of HTN, CAD was hospitalized at [facility] as a transfer from [address] [place] [address] for ischemic stroke  Patient became altered, started to have slurred speech, dysarthria and confusion  CT of head showed CVA of left MCA  TPA was administered and patient was transferred for IR intervention  Per IR note there was left MCA and 3 occlusion though no embolectomy was performed and patient was brought to the neuro ICU for monitoring  Patient's mental status improved, he is AAOx3  Patient was started on lipitor and lopressor  Control head CT showed Evolving infarct in the left lateral temporal lobe  No evidence for space-occupying hematoma or mass effect  I spoke to Dr Sakshi Salas  Patient will be transferred to [facility] floor  Recent or scheduled procedures:     Recent diagnostics: CT stroke alert brain - No acute intracranial hemorrhage or signs of a developing infarct  However, hyperdensity within the left insula may reflect atherosclerotic disease or thrombotic left M2 vessel  Needs correlation with CTA  CTA stroke alert - Loss of contrast opacification, a short segment, within the proximal portion of the superior division of the left MCA  Distal reconstitution is noted   Remainder of the intracranial vasculature appears intact with signs atherosclerotic disease  No hemodynamically significant stenosis within the cervical carotids by NASCET criteria  Patent bilateral cervical vertebral arteries  XR stroke alert chest - No acute abnormality in the chest     IR cerebral angiography/intervention - Interval distal propagation of previously seen left MCA M2 thrombus now seen in a M3 branch  No thrombectomy was performed due to the distal occlusion and minimal symptoms  Echo of heart - The ventricle was moderately dilated  Systolic function was severely reduced  Ejection fraction was estimated to be 20 %  There was severe diffuse hypokinesis  There was mild concentric hypertrophy  Diastolic dysfunction with normal LV filling pressures  No evidence of apical thrombus  CT head wo contrast - Evolving infarct in the left lateral temporal lobe  No evidence for space-occupying hematoma or mass effect        Nutrition Plan: cardiac diet      Katy Vaughn DO  PGY 2 resident

## 2017-10-10 NOTE — CONSULTS
Consultation - Neurology   Cecelia Torres 50 y o  male MRN: [id number]  Unit/Bed#: ICU 13 Encounter: [id number]      Physician Requesting Consult: Keisha Stafford DO  Inpatient consult to Neurology  Consult performed by: VIVIAN AMBROSIO  Consult ordered by: Kelvin Cerda        Reason for Consult / Principal Problem: stroke management      Assessment:  Acute left mca cva, embolic  S/p tpa and attempted endovascular clot retrieval however m3 clot too distal for retrieval     Despite vascular risk factors suboptimally controlled with triglycerides of 220 and LDL of 143, there is not any hemodynamically significant atherosclerosis in the intra/extracranial vessels to serve as a stroke nidus for embolization therefore more likely to be cardiogenic  Plan:  1  Repeat CT head shortly after 6 pm for 24 hour post IV tpa scan  Also stat CT head with any neuro change  2  Follow iv tpa guidelines for bp management  3  No anti-platelets until 24 hour ct scan shows no evidence of hemorragic conversaion  If stable start aspirin 81 mg daily  4  2- D Echo  Consider KRISTINA if 2-D echo is unremarkable to look for a cardiac thrombus  Depending on findings we may need thrombosis panel and lower extremity dopplers  5  MRI brain w/o contrast  6  hba1c, tsh  7  lipitor 80 mg daily  8  Nicotine patch  9  PT/OT        HPI: Cecelia Torres is a 50 y o  male who has a history of CAD S/P cabg was transferred here yesterday evening to [address] [place] [address] from [facility] for endovascular clot retrieval attempt however upon cerebral angiogram it was determined that he has a left mca m3 division clot and thus no embolectomy was performed  He initially presented to the [address] [place] [address] with a Wernicke's type aphasia around 5 pm  His wife witnessed the event, said he was unable to name common objects, appeared confused    Dr Melody Salazar the oncall neurologist recommended giving IV tpa prior to transfer, please see his note from 10/9 for details  Per conversation he had with the ED physician Dr Savannah Wisdom the NIH stroke scale was 6  IV tpa bolus administered on 10/9 at 6:20 pm  Per conversation with nursing staff, NIH 0 prior to taking to angio suite for attempted clot retrieval           ROS:  Per 12 point review, mentions just some fatigue  Rest of systems negative  Historical Information   Past Medical History:   Diagnosis Date    Cardiac disease     Hx of CABG      History reviewed  No pertinent surgical history  Social History   History   Smoking Status    Current Every Day Smoker   Smokeless Tobacco    Current User     History   Alcohol Use    Yes     History   Drug Use No       Family History: non-contributory      Meds/Allergies     Allergies   Allergen Reactions    Penicillins        current meds:   Current Facility-Administered Medications   Medication Dose Route Frequency    atorvastatin (LIPITOR) tablet 40 mg  40 mg Oral QPM    chlorhexidine (PERIDEX) 0 12 % oral rinse 15 mL  15 mL Swish & Spit Q12H [address]    nicotine (NICODERM CQ) 21 mg/24 hr TD 24 hr patch 1 patch  1 patch Transdermal Daily       Objective   Vitals:Blood pressure 152/99, pulse 84, temperature 99 2 °F (37 3 °C), resp  rate 20, SpO2 96 %  ,There is no height or weight on file to calculate BMI  Physical Exam:   Physical Exam General appearance: alert, appears stated age and cooperative  Head: Normocephalic, without obvious abnormality, atraumatic  Lungs: clear to auscultation bilaterally  Heart: regular rate and rhythm    Neurologic:  Cognitive:  Mental status: Alert, oriented to self, location, month, day of week not actual date, knows year  Attention/concentration intact  Able to correctly state 11 quarters in $2 75, able to spell "world" forwards/backwards  Can name months in year backwards expect missed one month  Able to process one step commands   When asking to process multistep commands rt thumb to left ear then right ear then nose, both times he touched the right ear then left ear  No neglect or difficulty with two point discrimination  CN: CNII-XII normal    Motor: normal tone and bulk  5 power UE/LE bilat  Sensory: proprioception, light touch intact throughout  Slight vibration sensation decrease distal LE bilat  Cerebellar: finger to nose, heel to shin no dysmetria or ataxia  DTR's: 2 ue bilat, + cross adductors, rt ankle 1, left ankle 2  Plantars: downgoing bilat        Lab Results: I have personally reviewed pertinent reports  Imaging Studies: I have personally reviewed pertinent films in PACS    EKG, Pathology, and Other Studies: I have personally reviewed pertinent films in PACS        Counseling / Coordination of Care  35 min total critical care time spent

## 2017-10-10 NOTE — BRIEF OP NOTE (RAD/CATH)
IR CEREBRAL ANGIOGRAPHY / INTERVENTION  Procedure Note    PATIENT NAME: Emmie Silva  : 1965  MRN: [id number]     Pre-op Diagnosis:   1  Cerebrovascular accident (CVA) due to embolism of middle cerebral artery, unspecified blood vessel laterality ([facility])      Post-op Diagnosis:   1  Cerebrovascular accident (CVA) due to embolism of middle cerebral artery, unspecified blood vessel laterality [facility])        Surgeon:   Sierra Bosch MD  Assistants:     No qualified resident was available, Resident is only observing    Estimated Blood Loss: 15 cc  Findings: Left MCA inferior division M3 branch occlusion  Right femoral artery closure device deployed      Specimens: none    Complications:  none    Anesthesia: MAC    Sierra Bosch MD     Date: 10/9/2017  Time: 9:05 PM

## 2017-10-10 NOTE — CASE MANAGEMENT
Initial Clinical Review    Admission: Date/Time/Statement: 10/9/17 @ 2202     Orders Placed This Encounter   Procedures    Inpatient Admission     Standing Status:   Standing     Number of Occurrences:   1     Order Specific Question:   Admitting Physician     Answer:   Priscilla Elliott [[id number]]     Order Specific Question:   Level of Care     Answer:   Critical Care [15]     Order Specific Question:   Estimated length of stay     Answer:   More than 2 Midnights     Order Specific Question:   Certification     Answer:   I certify that inpatient services are medically necessary for this patient for a duration of greater than two midnights  See H&P and MD Progress Notes for additional information about the patient's course of treatment  10/9/2017 (1 hours)  [facility] Emergency Department     Stroke (cerebrum) [facility])   Expressive aphasia    TRANSFER TO [address] [place] [address] TREATMENT PRIOR TO ARRIVAL  IV ALTEPLASE     History of Illness: The patient is a 53-year-old male who presents 1 hour after developing slurred speech as well as mixing of his words  The wife notes that he also seemed confused  No chest pain, shortness of breath, fevers, chills, sweats  He has mild left-sided facial droop  No arm or leg weakness  No ataxia  He is unable to name certain objects when I point them out, he also has trouble following commands although he is alert  I suspect that his inability to follow commands is secondary to a receptive aphasia because he keeps saying that he does not understand that he is confused  I spoke with Interventional Radiology, critical care as well as Neurology, gave tPA, no contraindications, will transfer to [place] for interventional radiology        Vital Signs:   Temperature Pulse Respirations Blood Pressure SpO2   10/09/17 2115 10/09/17 2115 10/09/17 2115 10/09/17 2115 10/09/17 2115   99 2 °F (37 3 °C) 80 15 152/83 94 %      Temp Source Heart Rate Source Patient Position - Orthostatic VS BP Location FiO2 (%)   10/10/17 0800 -- -- -- --   Oral          Pain Score       10/09/17 2200       No Pain        Wt Readings from Last 1 Encounters:   10/10/17 99 kg (218 lb 4 1 oz)         Abnormal Labs/Diagnostic Test Results:     CTA HEAD AND NECK   Loss of contrast opacification, a short segment, within the proximal portion of the superior division of the left MCA     CT HEAD   No acute intracranial hemorrhage or signs of a developing infarct    However, hyperdensity within the left insula may reflect atherosclerotic disease or thrombotic left M2 vessel        Past Medical/Surgical History:      Cardiac disease    Hx of CABG       Admitting Diagnosis: Cerebrovascular accident (CVA) due to embolism of middle cerebral artery, unspecified blood vessel laterality ([facility] [address] ) [I63 [id number]]    Age/Sex: 50 y o  male    Assessment/Plan:  Acute L MCA stroke s/p TPA and attempted endovascular clot retrieval  CAD s/p CABG     PLAN  S/p TPA and attempted clot retrieval  ICU monitoring  Repeat CT head in 24hrs  Permissive hypertension up to SBP-180  No anti-platelets/anti-coagulants  Echo  MRI brain  Lipid profile   Start lipitor  Nicotine patch  PT/OT    Anesthesia Start Date/Time: 10/09/17 1942   Procedure: IR CEREBRAL ANGIOGRAPHY / INTERVENTION   Diagnosis: Cerebrovascular accident (CVA) due to embolism of middle cerebral artery, unspecified blood vessel laterality ([facility] [address] ) [I63 [id number]]       Admission Orders:    ICU  LIPID PANEL WITH LDL  MRI BRAIN   ECHO  CONSULT NEUROLOGY  DYSPHAGIA ASSESSMENT   NPO   NEURO CHECKS   During Alteplase Infusion: 1) Every 15 minutes   Following Alteplase infusion: 1) Every 15 minutes for 2 hours, then 2) Every 30 minutes for 6 hours, then 3) Every 60 minutes  for 16 hours, then per unit protocol   4) Call physician if any deterioration, development of severe headache, nausea or vomiting    PT AND OT EVAL AND TREAT      Scheduled Meds:   atorvastatin 80 mg Oral QPM chlorhexidine 15 mL Swish & Spit Q12H [address]   metoprolol tartrate 12 5 mg Oral Q12H [address]   nicotine 1 patch Transdermal Daily     Continuous Infusions:    PRN Meds:

## 2017-10-10 NOTE — PROGRESS NOTES
Received from IR to ICU Rm 13, awake, alert, coherent, GCS 15, dsg on R   Groin is dry, intact, immobilizer on right leg, denies pain, spoken to by Dr Mckenna Peters,

## 2017-10-10 NOTE — PLAN OF CARE
Problem: DISCHARGE PLANNING - CARE MANAGEMENT  Goal: Discharge to post-acute care or home with appropriate resources  INTERVENTIONS:  - Conduct assessment to determine patient/family and health care team treatment goals, and need for post-acute services based on payer coverage, community resources, and patient preferences, and barriers to discharge  - Address psychosocial, clinical, and financial barriers to discharge as identified in assessment in conjunction with the patient/family and health care team  - Arrange appropriate level of post-acute services according to patient's   needs and preference and payer coverage in collaboration with the physician and health care team  - Communicate with and update the patient/family, physician, and health care team regarding progress on the discharge plan  - Arrange appropriate transportation to post-acute venues  Assist pt and family with referrals to [place] [id number], [address] and in pt acute rehab  Outcome: Progressing

## 2017-10-10 NOTE — ANESTHESIA POSTPROCEDURE EVALUATION
Post-Op Assessment Note      CV Status:  Stable    Mental Status:  Alert and awake    Hydration Status:  Euvolemic    PONV Controlled:  Controlled    Airway Patency:  Patent    Post Op Vitals Reviewed: Yes          Staff: CRNA       Comments: report to ICU RN          BP   168/86   Temp      Pulse  81   Resp   12   SpO2   94

## 2017-10-10 NOTE — SPEECH THERAPY NOTE
Speech/Language Pathology  Assessment    Patient Name: Kenn Ruiz  CBJEX'K Date: 10/10/2017     Problem List  Patient Active Problem List   Diagnosis    Cerebrovascular accident (CVA) due to embolism of middle cerebral artery ([facility])    Hypertension     Past Medical History  Past Medical History:   Diagnosis Date    Cardiac disease     Hx of CABG      Past Surgical History  History reviewed  No pertinent surgical history  50 y o  male who presents as a transfer from [facility] for ischemic stroke  Per chart review patient acutely became altered at around 17:00 he started having slurred speech and some dysarthria, he seemed more confused according to the wife who witnessed the event  He had a mild left-sided facial droop on presentation to the ED  He had some receptive aphasia and was unable to name common objects  Upon consultation with Neurology, Interventional Radiology tPA was administered and patient was transferred to [facility] for IR intervention  Per IR note there was left MCA and 3 occlusion though no embolectomy was performed  and patient was brought to the neuro ICU  for monitoring  On arrival patient is mildly confused, slow to answer but otherwise nonfocal   Neurologic exam, able to follow commands  Social:  Works at [employer], lives w/ wift & 2 kids  Orientation:  Place: +  City:+  Year: +  Month:+  EDOUARD:+  Time of Day: -  Hospital:+  Reason for hospitalization: +  Pt needed min cues, at times talked himself through the orientation ?s     Auditory Comprehension:  R/L discrim: +  Body part ID:+  One step commands:+  Two step commands: + with repetition, min assist  Complex or 3 step commands: repetition w/ assist   Picture ID: na  Word ID: na  Letter ID: na  Personal y/n ?'s:+  Basic y/n ?'s: able to answer w/ min cues  Complex y/n ?'s: 60%  Paragraph Comprehension: na      Oral Expression:  Auto Sequences: wfl w/ mild cues   Word Repetition: fair, until multisyllables  Phrase Completion: mildly reduced  Responsive Naming: needed repeats, mildly reduced  Picture Naming: fair  Object Naming: wfl  Divergent Naming: mod reduced    Motor Speech:  WFL  Cognitive -linguistic skills:  Grossly intact  Impaired  Impaired at baseline  Needs further evaluation? Problem solving:  Organizational tasks:  Sequencing:  Immediate memory:  Delayed memory:  Short term memory:  Long term memory:    Also noted:  Distractable  Inattention    Impression:  Pt w/ slow, hesitant word finding deficits in conversation  WOuld benefit from ongoing assessment  Recommendation:  Appropriate for outpt services vs inpt rehab    Goals:  Ongoing written, reading & math assessments

## 2017-10-10 NOTE — PLAN OF CARE
Problem: PHYSICAL THERAPY ADULT  Goal: Performs mobility at highest level of function for planned discharge setting  See evaluation for individualized goals  Treatment/Interventions: Functional transfer training, Elevations, LE strengthening/ROM, Therapeutic exercise, Endurance training, Patient/family training, Equipment eval/education, Bed mobility, Gait training          See flowsheet documentation for full assessment, interventions and recommendations  Prognosis: Good  Problem List: Decreased strength, Decreased endurance, Impaired balance, Decreased mobility, Decreased coordination, Decreased cognition (expressive and receptive issues)  Assessment: Pt seen for high complexity physical therapy evaluation  Pt is a 51 y/o male w/ hostory/comorbidities of CAD, CABG who is now admitted as a transfer from [facility] w  slurred speech, dysarthria, L facial droop, word finding issues, unsteady gait     Found to have acute L MCA- transferred here and had tpa w/ attempted endovascular clot retrieval   Given need for ICU monitoring w/ continued expressive and receptive issues, need for nursing 2:1 monitoring, note unstable clinical picture  PT consulted to assess mobility, d/c needs     Pt presents w/ decreased functional mob, standing balance, endurance, B LE strength and coordination, barriers at home  will benefit from skilled PT to correct for the above problems  Will follow for d/c needs- issues are more cog/communication in nature, anticipate home w/ outpatient therapies        Recommendation:  (recommend home w/ home therapies but will follow)     PT - OK to Discharge:  (? home w/ outpatient therapies)    See flowsheet documentation for full assessment

## 2017-10-10 NOTE — OCCUPATIONAL THERAPY NOTE
[address]  Evaluation     Patient Name: Zac Batres  [place] Date: 10/10/2017  Problem List  Patient Active Problem List   Diagnosis    Cerebrovascular accident (CVA) due to embolism of middle cerebral artery ([address] )    Hypertension     Past Medical History  Past Medical History:   Diagnosis Date    Cardiac disease     Hx of CABG      Past Surgical History  History reviewed  No pertinent surgical history  10/10/17 1130   Note Type   Note type Eval/Treat   Restrictions/Precautions   Weight Bearing Precautions Per Order No   Other Precautions Bed Alarm; Chair Alarm;Cognitive; Impulsive; Fall Risk;Telemetry;Multiple lines   Pain Assessment   Pain Assessment No/denies pain   Pain Score No Pain   Home Living   Type of Home House   Home Layout Multi-level   Additional Comments pt lives w/ wife and 2 children in [place] in a [address] farm house w/ first floor bedroom/bathroom w/ 2nd floor full setup as well w/ 1 ANDERSON from the front vs 5 ANDERSON from the garage  Prior Function   Level of [place] Independent with ADLs and functional mobility   Lives With Spouse   ADL Assistance Independent   IADLs Independent   Falls in the last 6 months 0   Vocational Full time employment   Lifestyle   Autonomy Pt was completely I w/ I/ADLS, drove, & required no use of DME PTA  Reciprocal Relationships Supportive wife pt lives w/ and 2 children son and dtr  Service to 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  Intrinsic Gratification Enjoys spending time with family and working on cars  Psychosocial   Psychosocial (WDL) WDL   Subjective   Subjective "That's too much for me right now"     ADL   Eating Assistance 6  Modified independent   Grooming Assistance 6  Modified Independent   UB Bathing Assistance 5  Supervision/Setup   LB Bathing Assistance 5  Supervision/Setup   UB Dressing Assistance 5  Supervision/Setup   LB Dressing Assistance 5  Supervision/Setup   Toileting Assistance  5  Supervision/Setup Functional Assistance 5  Supervision/Setup   Bed Mobility   Rolling R 5  Supervision   Rolling L 5  Supervision   Sit to Supine 5  Supervision   Transfers   Sit to Stand 5  Supervision   Stand to Sit 5  Supervision   Functional Mobility   Functional Mobility 5  Supervision   Additional items Hand hold assistance   Balance   Static Sitting Fair +   Dynamic Sitting Fair +   Static Standing Fair +   Dynamic Standing Fair +   Ambulatory Fair +   Activity Tolerance   Activity Tolerance Patient tolerated treatment well;Treatment limited secondary to medical complications (Comment)  (carotids/echo pending @ bedside)   RUE Assessment   RUE Assessment WFL   LUE Assessment   LUE Assessment WFL   Hand Function   Gross Motor Coordination Functional   Fine Motor Coordination Functional   Sensation   Light Touch No apparent deficits   Proprioception   Proprioception No apparent deficits   Vision-Basic Assessment   Current Vision No visual deficits   Vision - Complex Assessment   Ocular Range of Motion WFL   Perception   Inattention/Neglect Appears intact   Cognition   Overall Cognitive Status WFL   Arousal/Participation Alert   Attention Within functional limits   Orientation Level Oriented X4   Memory Within functional limits   Following Commands Follows all commands and directions without difficulty   Assessment   Limitation Decreased Safe judgement during ADL;Decreased cognition;Decreased high-level ADLs; Decreased ADL status   Prognosis Good   Assessment Pt is pleasant, employed full time 51 y/o male seen for OT eval s/p adm to [facility] s/p tfer frmo SLT, initially presented w/ altered mental status, slurred speech, dysarthria, confusion, mild L sided facial droop expressive/receptive aphasia, received TPA and was ultimately transferred to [facility] for IR intervention I e   Cerebral angio 10/9/2017, IR noted there to be a L MCA and 3 occlusion though no embolectomy was performed and pt was brought to the neuro ICU for monitoring, dx'd w/  L MCA CVA s/p TPA CTB: (-) acute, MRIB ordered and P, comorbidities include a h/o CABG, cardiac disease,   Pt was I w/  I/ADLS, drove, & required no use of DME PTA, pt lives w/ wife and 2 children in [place] in [facility] [place] [facility] farm house w/ first floor bedroom/bathroom w/ 2nd floor full setup as well w/ 1 ANDERSON from the front vs 5 ANDERSON from the garage  Pt is currently demonstrating the following occupational deficits: limited 2* difficulty w/ word finding/language fluency, object agnosia, expressive<receptive aphasia, delayed processing, decreased attention/concentration to task, impulsive, minimizes impairments, decreased safety insight judgment and awareness into deficits  Pt requires overall SBA for ADLs/self care and SBA for fx'l mobility w/o DME/LOB  The following Occupational Performance Areas to address include: bathing/shower, toilet hygiene, dressing, medication management, socialization, health maintenance, functional mobility, community mobility, clothing management, cleaning, meal prep, money management, household maintenance, care of children, care of pets, job performance/volunteering and social participation  Pt scored overall 55/100 on the Barthel Index and 4/6 on the Modified [place] Scale  Based on the aforementioned OT evaluation, functional performance deficits, and assessments, pt has been identified as a moderate complexity evaluation  Recommend outpt OT/SLP  Pt to continue to benefit from acute immediate OT services to address the following goals 3-5x/wk to  w/in 7-10 days:   Goals   Patient Goals "I'm having a hard time processing"  Short Term Goal #1 See established goals as stated below  Plan   Treatment Interventions ADL retraining;Functional transfer training;UE strengthening/ROM; Cognitive reorientation; Endurance training;Patient/family training;Equipment evaluation/education; Compensatory technique education; Neuromuscular reeducation;Continued evaluation; Energy conservation; Activityengagement   Goal Expiration Date 10/20/17   OT Frequency 3-5x/wk   Recommendation   OT Discharge Recommendation Outpatient OT  (potentially outpt SLP though defer to SLP)   OT - OK to Discharge Yes  (potentially outpt SLP though defer to SLP)   Barthel Index   Feeding 10   Bathing 5   Grooming Score 0   Dressing Score 5   Bladder Score 10   Bowels Score 10   Toilet Use Score 5   Transfers (Bed/Chair) Score 10   Mobility (Level Surface) Score 0   Stairs Score 0   Barthel Index Score 55   Modified Dupo Scale   Modified Dupo Scale 4   1) Pt will imrpove activity tolerance to G for min 30 min txment sessions  2) Pt will complete ADLs/self care w/ mod I  3) Pt will complete toileting w/ mod I w/ G hygiene/thoroughness using DME PRN  4) Pt will improve fx'l tfers on/off all surfaces using dME PRN w/ G balance/safety including toileting w/ mod I  5) Pt will improve fx'l mobility during I/ADl/leisure tasks using DME PRN w/ g balance/safety w/ mod I  6) Pt will engage in ongoing cognitive assessment w/ G participation w/ mod I to A w/ safe d/c planning/recommendations  7) Pt will demonstrate G carryover of pt/caregiver education and training as appropriate w/ mod I w/o cues w/ G tolerance  8) Pt will engage in depression screen/leisure interest checklist w/ mod I and G participation to monitor s/s depression and ID 3 positive coping strategies to A w/ emotional regulation and management  9) Pt will improve UB fx'l use/ROM to [facility] and strength 1/2 MMT via AROM/AAROM/PROM in all planes as tolerated s/p skilled education of HEP w/ mod I w/o cues for carryover  10) Pt will tolerate mod manual NDT facilitation t/o hemibody during fx'l I/ADL/leisure tasks w/ mod I to improve fx'l engagement increase neuroplastic recovery  11) Pt will engage in ongoing  assessments, screens, and activities t/o fx'l I/ADL/leisure tasks w/ G participation and mod I to A w/ adaptation and accomodations or rule out visual

## 2017-10-11 ENCOUNTER — APPOINTMENT (INPATIENT)
Dept: RADIOLOGY | Facility: HOSPITAL | Age: 52
DRG: 045 | End: 2017-10-11
Payer: COMMERCIAL

## 2017-10-11 LAB
ANION GAP SERPL CALCULATED.3IONS-SCNC: 7 MMOL/L (ref 4–13)
BASOPHILS # BLD AUTO: 0.03 THOUSANDS/ΜL (ref 0–0.1)
BASOPHILS NFR BLD AUTO: 0 % (ref 0–1)
BUN SERPL-MCNC: 14 MG/DL (ref 5–25)
CALCIUM SERPL-MCNC: 8.4 MG/DL (ref 8.3–10.1)
CHLORIDE SERPL-SCNC: 107 MMOL/L (ref 100–108)
CO2 SERPL-SCNC: 25 MMOL/L (ref 21–32)
CREAT SERPL-MCNC: 0.75 MG/DL (ref 0.6–1.3)
EOSINOPHIL # BLD AUTO: 0.09 THOUSAND/ΜL (ref 0–0.61)
EOSINOPHIL NFR BLD AUTO: 1 % (ref 0–6)
ERYTHROCYTE [DISTWIDTH] IN BLOOD BY AUTOMATED COUNT: 14.2 % (ref 11.6–15.1)
GFR SERPL CREATININE-BSD FRML MDRD: 107 ML/MIN/1.73SQ M
GLUCOSE SERPL-MCNC: 112 MG/DL (ref 65–140)
HCT VFR BLD AUTO: 44.4 % (ref 36.5–49.3)
HGB BLD-MCNC: 15.1 G/DL (ref 12–17)
LYMPHOCYTES # BLD AUTO: 3.59 THOUSANDS/ΜL (ref 0.6–4.47)
LYMPHOCYTES NFR BLD AUTO: 26 % (ref 14–44)
MCH RBC QN AUTO: 29.5 PG (ref 26.8–34.3)
MCHC RBC AUTO-ENTMCNC: 34 G/DL (ref 31.4–37.4)
MCV RBC AUTO: 87 FL (ref 82–98)
MONOCYTES # BLD AUTO: 1.34 THOUSAND/ΜL (ref 0.17–1.22)
MONOCYTES NFR BLD AUTO: 10 % (ref 4–12)
NEUTROPHILS # BLD AUTO: 8.58 THOUSANDS/ΜL (ref 1.85–7.62)
NEUTS SEG NFR BLD AUTO: 63 % (ref 43–75)
NRBC BLD AUTO-RTO: 0 /100 WBCS
PLATELET # BLD AUTO: 196 THOUSANDS/UL (ref 149–390)
PMV BLD AUTO: 11.8 FL (ref 8.9–12.7)
POTASSIUM SERPL-SCNC: 3.7 MMOL/L (ref 3.5–5.3)
RBC # BLD AUTO: 5.11 MILLION/UL (ref 3.88–5.62)
SODIUM SERPL-SCNC: 139 MMOL/L (ref 136–145)
WBC # BLD AUTO: 13.66 THOUSAND/UL (ref 4.31–10.16)

## 2017-10-11 PROCEDURE — 80048 BASIC METABOLIC PNL TOTAL CA: CPT | Performed by: INTERNAL MEDICINE

## 2017-10-11 PROCEDURE — 85025 COMPLETE CBC W/AUTO DIFF WBC: CPT | Performed by: INTERNAL MEDICINE

## 2017-10-11 PROCEDURE — 97532 HB COGNITIVE SKILLS DEVELOPMENT: CPT

## 2017-10-11 RX ADMIN — CHLORHEXIDINE GLUCONATE 15 ML: 1.2 RINSE ORAL at 21:53

## 2017-10-11 RX ADMIN — HEPARIN SODIUM 5000 UNITS: 5000 INJECTION, SOLUTION INTRAVENOUS; SUBCUTANEOUS at 15:12

## 2017-10-11 RX ADMIN — METOPROLOL TARTRATE 12.5 MG: 25 TABLET ORAL at 08:47

## 2017-10-11 RX ADMIN — NICOTINE 1 PATCH: 21 PATCH, EXTENDED RELEASE TRANSDERMAL at 08:48

## 2017-10-11 RX ADMIN — CHLORHEXIDINE GLUCONATE 15 ML: 1.2 RINSE ORAL at 08:46

## 2017-10-11 RX ADMIN — METOPROLOL TARTRATE 12.5 MG: 25 TABLET ORAL at 21:52

## 2017-10-11 RX ADMIN — ASPIRIN 81 MG 81 MG: 81 TABLET ORAL at 08:47

## 2017-10-11 RX ADMIN — ATORVASTATIN CALCIUM 80 MG: 80 TABLET, FILM COATED ORAL at 17:05

## 2017-10-11 RX ADMIN — HEPARIN SODIUM 5000 UNITS: 5000 INJECTION, SOLUTION INTRAVENOUS; SUBCUTANEOUS at 05:57

## 2017-10-11 RX ADMIN — HEPARIN SODIUM 5000 UNITS: 5000 INJECTION, SOLUTION INTRAVENOUS; SUBCUTANEOUS at 21:52

## 2017-10-11 NOTE — OCCUPATIONAL THERAPY NOTE
Occupational Therapy Treatment Note       10/11/17 1538   Restrictions/Precautions   Weight Bearing Precautions Per Order No   Other Precautions Chair Alarm   Lifestyle   Autonomy Pt was completely I w/ I/ADLS, drove, & required no use of DME PTA  Reciprocal Relationships Supportive wife pt lives w/ and 2 children son and dtr  Service to Others Employed full time for an [employer]  Intrinsic Gratification Enjoys spending time with family and working on cars  Pain Assessment   Pain Assessment No/denies pain   Pain Score No Pain   Cognition   Overall Cognitive Status Impaired   Arousal/Participation Alert   Attention Within functional limits   Orientation Level Oriented X4  (pt seems a bit defensive and opposed to testing)   Memory Unable to assess   Following Commands Follows one step commands without difficulty   Cognition Assessment Tools MOCA   Score 25   Assessment   Assessment Pt participated in occupational therapy with focus on activity tolerance,and formal cognitive screening  Pt presented sitting out of bed to bedside chair upon initiation of OT session  Pt pleasant and cooperative  Pt completed formal cognitive screening Zhen Cognitive Screen MOCA  Pt score 25/30 would indicate pt with mild cognitive deficits at this time  Pt noted for deficits in language area/expressive language repeating back verbalized information  Pt reported language improvements since pt admit to hospital however that he had to focus and concentrate very hard for his speech to be correct     Plan   Treatment Interventions ADL retraining   Goal Expiration Date 10/20/17   Treatment Day 1   OT Frequency 3-5x/wk   Recommendation   OT Discharge Recommendation Outpatient OT   OT - OK to Discharge Yes   Barthel Index   Grooming Score 5   Dressing Score 5   Toilet Use Score 5   Transfers (Bed/Chair) Score 10   Mobility (Level Surface) Score 0   Modified Stone Scale   Modified Stone Scale 3     Bismark Bansal  ENGEL/L

## 2017-10-11 NOTE — PLAN OF CARE
Problem: OCCUPATIONAL THERAPY ADULT  Goal: Performs self-care activities at highest level of function for planned discharge setting  See evaluation for individualized goals  Treatment Interventions: ADL retraining, Functional transfer training, UE strengthening/ROM, Cognitive reorientation, Endurance training, Patient/family training, Equipment evaluation/education, Compensatory technique education, Neuromuscular reeducation, Continued evaluation, Energy conservation, Activityengagement          See flowsheet documentation for full assessment, interventions and recommendations  Outcome: Progressing  Limitation: Decreased Safe judgement during ADL, Decreased cognition, Decreased high-level ADLs, Decreased ADL status  Prognosis: Good  Assessment: Pt participated in occupational therapy with focus on activity tolerance,and formal cognitive screening  Pt presented sitting out of bed to bedside chair upon initiation of OT session  Pt pleasant and cooperative  Pt completed formal cognitive screening Zhen Cognitive Screen MOCA  Pt score 25/30 would indicate pt with mild cognitive deficits at this time  Pt noted for deficits in language area/expressive language repeating back verbalized information  Pt reported language improvements since pt admit to hospital however that he had to focus and concentrate very hard for his speech to be correct  OT Discharge Recommendation: Outpatient OT  OT - OK to Discharge:  Yes

## 2017-10-11 NOTE — PROGRESS NOTES
[facility] Internal Medicine Progress Note  Patient: Fanta Alvares 50 y o  male   MRN: [id number]  PCP: Jazmine Babin DO  [facility] Encounter: [id number]  Date Of Visit: 10/11/17    Assessment:    Principal Problem:    Cerebrovascular accident (CVA) due to embolism of middle cerebral artery ([address] )  Active Problems:    Hypertension    CAD (coronary artery disease)    S/P CABG (coronary artery bypass graft)      Plan:    · Left M2 branch CVA s/p tPA which evolved into M3 brach occlusion too distal for retrieval repeat CT head showing left lateral temporal ischemia - neurology following  For KRISTINA 10/12 (patient initially refused, but is now agreeable)  Patient refusing MRI  Continue aspirin and statin  · Hypertension-metoprolol  · Coronary artery disease/ischemic cardiomyopathy with an ejection fraction of 20%/CABG - outpatient follow up with with cardiology  · Tobacco abuse - nicotine patch      VTE Pharmacologic Prophylaxis:   Pharmacologic: Heparin  Mechanical VTE Prophylaxis in Place: Yes    Patient Centered Rounds: I have performed bedside rounds with nursing staff today  Discussions with Specialists or Other Care Team Provider: neurology, cardiology    Education and Discussions with Family / Patient: patient, spoke with wife via phone    Time Spent for Care: 1 hour  More than 50% of total time spent on counseling and coordination of care as described above  Timse spent with patient discussing plan of care, required interventions, also discussed with wife and answered questions, time also spent coordinating care with specialist    Current Length of Stay: 2 day(s)    Current Patient Status: Inpatient   Certification Statement: The patient will continue to require additional inpatient hospital stay due to KRISTINA    Discharge Plan / Estimated Discharge Date: when cleared by neurology    Code Status: Level 1 - Full Code      Subjective:   Patient frustrated    Does not understand why he needs all the testing done  Frustrated that he may need to stay additional days  Objective:     Vitals:   Temp (24hrs), Av 3 °F (36 8 °C), Min:98 °F (36 7 °C), Max:99 °F (37 2 °C)    HR:  [71-92] 79  Resp:  [16-23] 16  BP: (114-165)/(67-94) 117/67  SpO2:  [95 %-97 %] 95 %  Body mass index is 30 44 kg/m²  Input and Output Summary (last 24 hours): Intake/Output Summary (Last 24 hours) at 10/11/17 1520  Last data filed at 10/11/17 1444   Gross per 24 hour   Intake              940 ml   Output                0 ml   Net              940 ml       Physical Exam:     Physical Exam   Constitutional: He is oriented to person, place, and time  He appears well-developed  No distress  HENT:   Head: Normocephalic and atraumatic  Neck: Normal range of motion  Neck supple  Cardiovascular: Normal rate and regular rhythm  No murmur heard  Pulmonary/Chest: Effort normal and breath sounds normal  No respiratory distress  He has no wheezes  He has no rales  Abdominal: Soft  Bowel sounds are normal  He exhibits no distension  Musculoskeletal: He exhibits no edema  Neurological: He is alert and oriented to person, place, and time  No cranial nerve deficit  Skin: Skin is warm and dry  No rash noted  Psychiatric: He has a normal mood and affect         Additional Data:     Labs:      Results from last 7 days  Lab Units 10/11/17  0557   WBC Thousand/uL 13 66*   HEMOGLOBIN g/dL 15 1   HEMATOCRIT % 44 4   PLATELETS Thousands/uL 196   NEUTROS PCT % 63   LYMPHS PCT % 26   MONOS PCT % 10   EOS PCT % 1       Results from last 7 days  Lab Units 10/11/17  0557  10/10/17  0058   SODIUM mmol/L 139  < > 141   POTASSIUM mmol/L 3 7  < > 3 8   CHLORIDE mmol/L 107  < > 109*   CO2 mmol/L 25  < > 25   BUN mg/dL 14  < > 11   CREATININE mg/dL 0 75  < > 0 76   CALCIUM mg/dL 8 4  < > 8 1*   TOTAL PROTEIN g/dL  --   --  6 8   BILIRUBIN TOTAL mg/dL  --   --  0 27   ALK PHOS U/L  --   --  95   ALT U/L  --   --  33   AST U/L  --   --  16 GLUCOSE RANDOM mg/dL 112  < > 123   < > = values in this interval not displayed  Results from last 7 days  Lab Units 10/10/17  0058   INR  1 03       * I Have Reviewed All Lab Data Listed Above  * Additional Pertinent Lab Tests Reviewed: [address] Admission Reviewed    Imaging:    Imaging Reports Reviewed Today Include: CT head  Imaging Personally Reviewed by Myself Includes:  none    Recent Cultures (last 7 days):           Last 24 Hours Medication List:     aspirin 81 mg Oral Daily   atorvastatin 80 mg Oral QPM   chlorhexidine 15 mL Swish & Spit Q12H [facility]   heparin (porcine) 5,000 Units Subcutaneous Q8H [facility]   metoprolol tartrate 12 5 mg Oral Q12H [facility]   nicotine 1 patch Transdermal Daily        Today, Patient Was Seen By: Britney Isaac PA-C    ** Please Note: This note has been constructed using a voice recognition system   **

## 2017-10-11 NOTE — PLAN OF CARE
Activity Intolerance/Impaired Mobility     Mobility/activity is maintained at optimum level for patient Progressing        Communication Impairment     Ability to express needs and understand communication [address] Discharge to post-acute care or home with appropriate resources Progressing        Knowledge Deficit     Patient/family/caregiver demonstrates understanding of disease process, treatment plan, medications, and discharge instructions Progressing        Neurological Deficit     Neurological status is stable or improving Progressing        Nutrition     Nutrition/Hydration status is improving Progressing        Nutrition/Hydration-ADULT     Nutrient/Hydration intake appropriate for improving, restoring or maintaining nutritional needs Progressing        PAIN - ADULT     Verbalizes/displays adequate comfort level or baseline comfort level Progressing        Potential for Aspiration     Non-ventilated patient's risk of aspiration is minimized Progressing     Ventilated patient's risk of aspiration is minimized Progressing        Potential for Falls     Patient will remain free of falls Progressing        Prexisting or High Potential for Compromised Skin Integrity     Skin integrity is maintained or improved Progressing

## 2017-10-11 NOTE — PROGRESS NOTES
Progress Note - Neurology   Kya Serna 50 y o  male MRN: [id number]  [facility] Encounter: [id number]    Assessment:  49 y/o male with history of CAD/CABG who presented initially to [facility] as stoke alert with expressive aphasia and confusion  CT head was negative for acute abnormalities, but did suggest potential left M2 branch occlusion, which was confirmed on CTA head/neck  tPA administered following aggressive BP lowering and patient went for endovascular intervention, which revealed L M3 branch occlusion, no thrombectomy performed as deemed to distal for retrieval  Repeat head CT was performed last night with evidence of L lateral temporal ischemia  Echo performed yesterday revealed EF of 20%, severe hypokinesis, and atrial dilation  Plan:  1  MRI brain pending, patient unsure of metal/foreign body in orbits during pre-MRI screening; will obtain XR orbits prior to study  2  With markedly low EF/hypokinesis/atrial dilation, recommend KRISTINA to evaluate for evidence of cardiac thrombus  If present, possibly start AC with heparin  If no cardiac thrombus, would likely determine timing of oral anticoagulation initiation after assessing degree of stroke burden on MRI  This was discussed with attending and cardiology team  During discussion with cardiology, patient was refusing KRISTINA  However, will maintain patient on KRISTINA schedule in the event patient is agreeable  Discussed with patient the importance of obtaining this study to evaluate for cardiac thrombus  3  Continuing aspirin/high dose statin  4  Hemoglobin A1C, lipid panel reviewed  Secondary risk factor modification  5  Telemetry monitoring  6  Continued normotensive goals per primary team   7  Therapies following  8  Will arrange follow up with vascular neurology as outpatient  9  Discussed with attending neurologist and primary team  Will continue to monitor neurologic progression      Subjective:   Patient resting in bedside chair, doing well today  No acute changes or new neurologic symptoms endorsed  Patient anxious to leave, is not interested in any further testing, such as MRI or KRISTINA  Is receptive to anticoagulation, but does not want to be on long term medication  ROS:  Denies headache, acute vision/speech changes, chest pain, SOB, abdominal pain, N/V, fever/chills  Vitals: Blood pressure 114/73, pulse 90, temperature 98 °F (36 7 °C), temperature source Oral, resp  rate 18, height 5' 11" (1 803 m), weight 99 kg (218 lb 4 1 oz), SpO2 97 %  ,Body mass index is 30 44 kg/m²  Physical Exam:   Physical Exam   Constitutional: He is oriented to person, place, and time  He appears well-developed and well-nourished  HENT:   Head: Normocephalic and atraumatic  Eyes: Conjunctivae and EOM are normal  Pupils are equal, round, and reactive to light  Neck: Normal range of motion  Neck supple  Cardiovascular: Normal rate and regular rhythm  Pulmonary/Chest: Effort normal and breath sounds normal    Abdominal: Soft  Bowel sounds are normal    Musculoskeletal: Normal range of motion  Neurological: He is alert and oriented to person, place, and time  He has a normal Finger-Nose-Finger Test and a normal Heel to [employer]  Gait normal    Skin: Skin is warm and dry  Psychiatric: His speech is normal       Neurologic Exam     Mental Status   Oriented to person, place, and time  Follows 2 step commands  Attention: normal  Concentration: normal    Speech: speech is normal   Knowledge: good  Able to perform simple calculations  Able to read  Able to repeat  Normal comprehension  Cranial Nerves     CN II   Visual fields full to confrontation  CN III, IV, VI   Pupils are equal, round, and reactive to light  Extraocular motions are normal    Nystagmus: none   Ophthalmoparesis: none  Conjugate gaze: present    CN V   Facial sensation intact  CN VII   Facial expression full, symmetric       CN VIII   CN VIII normal      CN IX, X CN IX normal    CN X normal      CN XI   CN XI normal      CN XII   CN XII normal      Motor Exam   Muscle bulk: normal  Overall muscle tone: normal  Right arm pronator drift: absent  Left arm pronator drift: absent  5/5 UE and LE throughout bilaterally  Sensory Exam   Light touch normal    Vibration normal      Gait, Coordination, and Reflexes     Gait  Gait: normal    Coordination   Finger to nose coordination: normal  Heel to shin coordination: normal    Tremor   Resting tremor: absent  Intention tremor: absent    Reflexes   Right plantar: normal  Left plantar: normal  Right ankle clonus: absent  Left ankle clonus: absent      Lab, Imaging and other studies:   CT head 10/10/17: Evolving infarct in the left lateral temporal lobe  No evidence for space-occupying hematoma or mass effect  Echo 10/10/17: LEFT VENTRICLE: The ventricle was moderately dilated  Systolic function was severely reduced  Ejection fraction was estimated to be 20 %  There was severe diffuse hypokinesis  Wall thickness was mildly increased  There was mild concentric  hypertrophy  No evidence of apical thrombus  DOPPLER: Diastolic dysfunction with normal LV filling pressures      RIGHT VENTRICLE: The ventricle was mildly dilated  Systolic function was mildly reduced  Wall thickness was normal      LEFT ATRIUM: The atrium was moderately dilated      RIGHT ATRIUM: Size was at the upper limits of normal      MITRAL VALVE: Valve structure was normal  There was normal leaflet separation  DOPPLER: The transmitral velocity was within the normal range  There was no evidence for stenosis  There was mild regurgitation      AORTIC VALVE: The valve was not visualized well enough to rule out a bicuspid morphology  Leaflets exhibited normal thickness and normal cuspal separation  DOPPLER: Transaortic velocity was within the normal range  There was no evidence  for stenosis   There was no regurgitation      TRICUSPID VALVE: The valve structure was normal  There was normal leaflet separation  DOPPLER: The transtricuspid velocity was within the normal range  There was no evidence for stenosis  There was trace regurgitation  Pulmonary artery  systolic pressure was within the normal range      PULMONIC VALVE: Leaflets exhibited normal thickness, no calcification, and normal cuspal separation  DOPPLER: The transpulmonic velocity was within the normal range  There was trace regurgitation      PERICARDIUM: There was no pericardial effusion      AORTA: The root exhibited normal size for BSA      SYSTEMIC VEINS: IVC: The inferior vena cava was normal in size and course  Respirophasic changes were normal       CBC:   Results from last 7 days  Lab Units 10/11/17  0557 10/10/17  0532 10/10/17  0058   WBC Thousand/uL 13 66* 13 09* 12 56*   RBC Million/uL 5 11 5 09 4 99   HEMOGLOBIN g/dL 15 1 14 8 14 9   HEMATOCRIT % 44 4 44 6 42 7   MCV fL 87 88 86   PLATELETS Thousands/uL 196 202 194   , BMP/CMP:   Results from last 7 days  Lab Units 10/11/17  0557 10/10/17  0629 10/10/17  0058  10/09/17  1747   SODIUM mmol/L 139 140 141  --  139   POTASSIUM mmol/L 3 7 3 9 3 8  --  4 2   CHLORIDE mmol/L 107 108 109*  --  105   CO2 mmol/L 25 25 25  --  27   ANION GAP mmol/L 7 7 7  --  7   BUN mg/dL 14 9 11  --  16   CREATININE mg/dL 0 75 0 78 0 76  --  0 86   GLUCOSE RANDOM mg/dL 112 112 123  --  127   GLUCOSE, ISTAT   --   --   --   < >  --    CALCIUM mg/dL 8 4 8 4 8 1*  --  8 7   AST U/L  --   --  16  --  22   ALT U/L  --   --  33  --  44   ALK PHOS U/L  --   --  95  --  114   TOTAL PROTEIN g/dL  --   --  6 8  --  7 4   ALBUMIN g/dL  --   --  3 4*  --  3 9   BILIRUBIN TOTAL mg/dL  --   --  0 27  --  0 30   EGFR ml/min/1 73sq m 107 105 106  < > 101   < > = values in this interval not displayed  , HgBA1C:   , Coagulation:   Results from last 7 days  Lab Units 10/10/17  0058   INR  1 03   , Lipid Profile:   Results from last 7 days  Lab Units 10/10/17  0629   HDL mg/dL 29*   CHOLESTEROL mg/dL 216*   LDL CALC mg/dL 143*   TRIGLYCERIDES mg/dL 220*     VTE Prophylaxis: Sequential compression device (Venodyne)  and Heparin    Counseling / Coordination of Care  Total time spent today 30 minutes  Greater than 50% of total time was spent with the patient and / or family counseling and / or coordination of care  A description of the counseling / coordination of care: Discussed importance of KRISTINA to look for cardiac thrombus, MRI, ASA/statin, therapies  Stroke team f/u

## 2017-10-11 NOTE — PROGRESS NOTES
Pt refusing pre-MRI orbital XR because he refused MRI  Rounded earlier in the shift with SLJACK and they know that pt does not want MRI but accepts KRISTINA tomorrow   SLIM paged and notified

## 2017-10-11 NOTE — PROGRESS NOTES
KRISTINA requested by Neurology to rule out PFO and ELISA thrombus as etiology of his CVA  The patient is currently refusing the KRISTINA and would like to be discharged  Please call if there is any change and we will reschedule

## 2017-10-12 ENCOUNTER — ANESTHESIA (INPATIENT)
Dept: NON INVASIVE DIAGNOSTICS | Facility: HOSPITAL | Age: 52
DRG: 045 | End: 2017-10-12
Payer: COMMERCIAL

## 2017-10-12 ENCOUNTER — APPOINTMENT (INPATIENT)
Dept: NON INVASIVE DIAGNOSTICS | Facility: HOSPITAL | Age: 52
DRG: 045 | End: 2017-10-12
Attending: PSYCHIATRY & NEUROLOGY
Payer: COMMERCIAL

## 2017-10-12 ENCOUNTER — GENERIC CONVERSION - ENCOUNTER (OUTPATIENT)
Dept: OTHER | Facility: OTHER | Age: 52
End: 2017-10-12

## 2017-10-12 VITALS
DIASTOLIC BLOOD PRESSURE: 73 MMHG | WEIGHT: 218.26 LBS | HEART RATE: 78 BPM | BODY MASS INDEX: 30.56 KG/M2 | SYSTOLIC BLOOD PRESSURE: 120 MMHG | RESPIRATION RATE: 20 BRPM | TEMPERATURE: 97.9 F | HEIGHT: 71 IN | OXYGEN SATURATION: 99 %

## 2017-10-12 PROCEDURE — G8987 SELF CARE CURRENT STATUS: HCPCS

## 2017-10-12 PROCEDURE — G8989 SELF CARE D/C STATUS: HCPCS

## 2017-10-12 PROCEDURE — 93312 ECHO TRANSESOPHAGEAL: CPT

## 2017-10-12 PROCEDURE — G8988 SELF CARE GOAL STATUS: HCPCS

## 2017-10-12 PROCEDURE — 97530 THERAPEUTIC ACTIVITIES: CPT

## 2017-10-12 RX ORDER — WARFARIN SODIUM 3 MG/1
3 TABLET ORAL
Qty: 3 TABLET | Refills: 0 | Status: SHIPPED | OUTPATIENT
Start: 2017-10-12 | End: 2018-02-21

## 2017-10-12 RX ORDER — SODIUM CHLORIDE 9 MG/ML
INJECTION, SOLUTION INTRAVENOUS CONTINUOUS PRN
Status: DISCONTINUED | OUTPATIENT
Start: 2017-10-12 | End: 2017-10-12 | Stop reason: SURG

## 2017-10-12 RX ORDER — LIDOCAINE HYDROCHLORIDE 10 MG/ML
INJECTION, SOLUTION INFILTRATION; PERINEURAL AS NEEDED
Status: DISCONTINUED | OUTPATIENT
Start: 2017-10-12 | End: 2017-10-12 | Stop reason: SURG

## 2017-10-12 RX ORDER — PROPOFOL 10 MG/ML
INJECTION, EMULSION INTRAVENOUS AS NEEDED
Status: DISCONTINUED | OUTPATIENT
Start: 2017-10-12 | End: 2017-10-12 | Stop reason: SURG

## 2017-10-12 RX ORDER — NICOTINE 21 MG/24HR
1 PATCH, TRANSDERMAL 24 HOURS TRANSDERMAL DAILY
Qty: 28 PATCH | Refills: 0 | Status: SHIPPED | OUTPATIENT
Start: 2017-10-13 | End: 2018-01-25 | Stop reason: HOSPADM

## 2017-10-12 RX ORDER — ATORVASTATIN CALCIUM 80 MG/1
80 TABLET, FILM COATED ORAL EVERY EVENING
Qty: 30 TABLET | Refills: 0 | Status: SHIPPED | OUTPATIENT
Start: 2017-10-12 | End: 2018-05-10 | Stop reason: SDUPTHER

## 2017-10-12 RX ORDER — ASPIRIN 81 MG/1
81 TABLET, CHEWABLE ORAL DAILY
Qty: 30 TABLET | Refills: 0 | Status: SHIPPED | OUTPATIENT
Start: 2017-10-13

## 2017-10-12 RX ADMIN — TOPICAL ANESTHETIC 1 SPRAY: 200 SPRAY DENTAL; PERIODONTAL at 14:05

## 2017-10-12 RX ADMIN — PROPOFOL 50 MG: 10 INJECTION, EMULSION INTRAVENOUS at 14:07

## 2017-10-12 RX ADMIN — PROPOFOL 50 MG: 10 INJECTION, EMULSION INTRAVENOUS at 14:06

## 2017-10-12 RX ADMIN — NICOTINE 1 PATCH: 21 PATCH, EXTENDED RELEASE TRANSDERMAL at 08:16

## 2017-10-12 RX ADMIN — PROPOFOL 25 MG: 10 INJECTION, EMULSION INTRAVENOUS at 14:19

## 2017-10-12 RX ADMIN — PROPOFOL 25 MG: 10 INJECTION, EMULSION INTRAVENOUS at 14:13

## 2017-10-12 RX ADMIN — PROPOFOL 25 MG: 10 INJECTION, EMULSION INTRAVENOUS at 14:10

## 2017-10-12 RX ADMIN — PROPOFOL 25 MG: 10 INJECTION, EMULSION INTRAVENOUS at 14:17

## 2017-10-12 RX ADMIN — LIDOCAINE HYDROCHLORIDE 100 MG: 10 INJECTION, SOLUTION INFILTRATION; PERINEURAL at 14:05

## 2017-10-12 RX ADMIN — HEPARIN SODIUM 5000 UNITS: 5000 INJECTION, SOLUTION INTRAVENOUS; SUBCUTANEOUS at 05:50

## 2017-10-12 RX ADMIN — PROPOFOL 50 MG: 10 INJECTION, EMULSION INTRAVENOUS at 14:08

## 2017-10-12 RX ADMIN — PROPOFOL 50 MG: 10 INJECTION, EMULSION INTRAVENOUS at 14:05

## 2017-10-12 RX ADMIN — PROPOFOL 25 MG: 10 INJECTION, EMULSION INTRAVENOUS at 14:15

## 2017-10-12 RX ADMIN — PROPOFOL 25 MG: 10 INJECTION, EMULSION INTRAVENOUS at 14:09

## 2017-10-12 RX ADMIN — ATORVASTATIN CALCIUM 80 MG: 80 TABLET, FILM COATED ORAL at 17:19

## 2017-10-12 RX ADMIN — METOPROLOL TARTRATE 12.5 MG: 25 TABLET ORAL at 08:20

## 2017-10-12 RX ADMIN — SODIUM CHLORIDE: 0.9 INJECTION, SOLUTION INTRAVENOUS at 13:00

## 2017-10-12 RX ADMIN — PROPOFOL 25 MG: 10 INJECTION, EMULSION INTRAVENOUS at 14:11

## 2017-10-12 RX ADMIN — ASPIRIN 81 MG 81 MG: 81 TABLET ORAL at 08:20

## 2017-10-12 RX ADMIN — CHLORHEXIDINE GLUCONATE 15 ML: 1.2 RINSE ORAL at 08:16

## 2017-10-12 NOTE — OCCUPATIONAL THERAPY NOTE
Occupational Therapy Treatment Note:       10/12/17 1016   Pain Assessment   Pain Assessment 0-10   Pain Score No Pain   ADL   Where Assessed Other (Comment)  (pt amb in hallway; reports performing independently)   Grooming Assistance 7  Independent   UB Bathing Assistance 6  Modified Independent   LB Bathing Assistance 6  Modified Independent   UB Dressing Assistance 6  Modified independent   [address] [facility] 6  Modified independent   Toileting Assistance  6  Modified independent   Bed Mobility   Rolling R 7  Independent   Rolling L 7  Independent   Supine to Sit 7  Independent   Sit to Supine 7  Independent   Transfers   Sit to Stand 6  Modified independent   Stand to Sit 6  Modified independent   Stand pivot 6  Modified independent   Functional Mobility   Functional Mobility 6  Modified independent   Additional Comments amb in hallways independently   Toilet Transfers   Toilet Transfer Type To and from   Toilet Transfer Technique Ambulating   Toilet Transfers Modified independence   Cognition   Overall Cognitive Status Impaired   Arousal/Participation Alert   Attention Within functional limits   Orientation Level Oriented X4   Memory Decreased recall of recent events   Following Commands Follows one step commands without difficulty   Comments word finding deficits   Activity Tolerance   Activity Tolerance Patient tolerated treatment well   Assessment   Assessment Patient participated in skilled OT with focus on cognitive reorienation skills, functional mobility skills, activity tolerance/endurance, Patient continues to verbalize concerns over "pair of lost boots"  Obtained information for patient to follow through with  Patient pleasant and cooperative, appears slow with comprehension at times, however, does clarify information for better carryover  Patient would benefit from continued therapy on an Outpatient basis  Patient does not require further inpatient OT at this time and will be taken off caseload  Plan   Treatment Interventions ADL retraining;Functional transfer training; Endurance training;Cognitive reorientation; Compensatory technique education; Energy conservation   Goal Expiration Date 10/20/17   Treatment Day 2   OT Frequency 3-5x/wk   Recommendation   OT Discharge Recommendation Outpatient OT   OT - OK to Discharge Yes  (when medically cleared)   LELE Harris

## 2017-10-12 NOTE — DISCHARGE INSTRUCTIONS
Chronic Hypertension, Ambulatory Care   GENERAL INFORMATION:   Chronic hypertension  is a long-term condition in which your blood pressure (BP) is higher than normal  Your BP is the force of your blood moving against the walls of your arteries  Hypertension is a BP of 140/90 or higher  Common symptoms include the following:   · Headache     · Blurred vision    · Chest pain     · Dizziness or weakness     · Trouble breathing     · Nosebleeds  Seek immediate care for the following symptoms:   · Severe headache or vision loss    · Weakness in an arm or leg    · Confusion or difficulty speaking    · Discomfort in your chest that feels like squeezing, pressure, fullness, or pain    · Suddenly feeling lightheaded or trouble breathing    · Pain or discomfort in your back, neck, jaw, stomach, or arm  Treatment for chronic hypertension  may include medicine to lower your BP  You may also need to make lifestyle changes  Take your medicine exactly as directed  Manage chronic hypertension:   · Take your BP at home  Sit and rest for 5 minutes before you take your BP  Extend your arm and support it on a flat surface  Your arm should be at the same level as your heart  Follow the directions that came with your BP monitor  If possible, take at least 2 BP readings each time  Take your BP at least twice a day at the same times each day, such as morning and evening  Keep a log of your BP readings and bring it to your follow-up visits  · Eat less sodium (salt)  Do not add sodium to your food  Limit foods that are high in sodium, such as canned foods, potato chips, and cold cuts  Your healthcare provider may suggest that you follow the [address] [place] [address] Street  The plan is low in sodium, unhealthy fats, and total fat  It is high in potassium, calcium, and fiber  · Exercise regularly  Exercise at least 30 minutes per day, on most days of the week  This will help decrease your BP   Ask your healthcare provider about the best exercise plan for you  · Limit alcohol  Women should limit alcohol to 1 drink a day  Men should limit alcohol to 2 drinks a day  A drink of alcohol is 12 ounces of beer, 5 ounces of wine, or 1½ ounces of liquor  · Do not smoke  If you smoke, it is never too late to quit  Smoking can increase your BP  Smoking also worsens other health conditions you may have that can increase your risk for hypertension  Ask your healthcare provider for information if you need help quitting  Follow up with your healthcare provider as directed: You will need to return to have your BP checked and to have other lab tests done  Write down your questions so you remember to ask them during your visits  CARE AGREEMENT:   You have the right to help plan your care  Learn about your health condition and how it may be treated  Discuss treatment options with your caregivers to decide what care you want to receive  You always have the right to refuse treatment  The above information is an  only  It is not intended as medical advice for individual conditions or treatments  Talk to your doctor, nurse or pharmacist before following any medical regimen to see if it is safe and effective for you  © 2014 [address] is for End User's use only and may not be sold, redistributed or otherwise used for commercial purposes  All illustrations and images included in CareNotes® are the copyrighted property of [employer]  or [employer]o Hancock  Chronic Hypertension     Effects of a Stroke   AMBULATORY CARE:   The effects of a stroke  may be different for everyone  They may depend on where the stroke happened in your brain and how much damage occurred there  Some people may make a full recovery  Other people may have long-term or lifelong effects  It is important to talk to your healthcare provider about any symptoms you have after a stroke   There are medicines and therapies to help manage the effects of a stroke  Early treatment for a stroke can improve your chances for recovery  What family or support persons should know about the effects of a stroke: It is important for family or support persons to know how a stroke has affected your loved one  Know when to call 911, seek immediate care, or call your loved one's healthcare provider  Call 911 or have someone else call 911 for any of the following:   · You have any of the following signs of a stroke:      ¨ Numbness or drooping on one side of your face     ¨ Weakness in an arm or leg    ¨ Confusion or difficulty speaking    ¨ Dizziness, a severe headache, or vision loss    ·            · You cannot be woken  · You fall and hit your head  · You have a seizure  · You feel lightheaded, short of breath, and have chest pain  · You cough up blood  Seek care immediately if:   · Your arm or leg is painful, red, or larger than normal      · You feel weak, dizzy, or faint  · You have a fall without hitting your head  · Your blood sugar level or blood pressure is higher or lower than your healthcare provider said it should be  · You bleed heavily or cannot stop bleeding from a cut or injury  · You have a new, severe headache  Contact your healthcare provider if:   · You have a fever  · You have a rash  · You have new or worsening symptoms  · You feel extremely sad or anxious  · You feel you are unable to cope with your condition  · You have trouble sleeping  · You have open sores  · You choke or cough when eating or drinking  · You have questions or concerns about your condition or care    Problems with language, speech, and memory:   · Difficulty finding the right words or putting complete sentences together    · Difficulty putting words together that make sense    · Difficulty paying attention or a short attention span    · Difficulty writing or reading    · Problems with memory or being disoriented to time, place, or situation    · Problems thinking clearly or feeling confused    · Difficulty understanding written or spoken language or learning something new  Muscle and nerve problems:  A stroke may affect one side of your body or part of one side  You may be at an increased risk for falling if you have difficulty moving your leg muscles   You may have any of the following:  · Inability to move your arm, leg, or one side of your face (paralysis)    · Muscle weakness, spasms, or muscles that stay in one position (contracted)    · Poor balance, difficulty walking, or difficulty grasping objects    · Changes in your vision or poor vision    · Ignoring, or being unaware of one side of your body    · Numbness of your arm, hand, fingers, leg, foot, or toes    · Pain, tickling, or prickling in weak or paralyzed parts of your body  Bowel and bladder problems:   · Loss of control of your urine or bowel movements    · Feeling like you have to urinate frequently, even when your bladder is not full    · Difficulty emptying your bladder or constipation  Swallowing and eating problems:   · Difficulty swallowing food    · Difficulty feeding yourself    · A lack of taste or a change in the way you taste things    · An increased risk for aspiration (food moves into the lungs) and pneumonia due to swallowing problems  Changes in personality or mood:   · Depression, sadness, irritability, or hopelessness    · Anger, frustration, or anxiety    · Difficulty controlling emotions or expressing inappropriate emotions    · Quick mood changes  Fatigue:   · Low energy levels    · Tiring easily    · A lack of motivation  Problems sleeping:   · Development of sleep apnea    · Changes in sleep such as sleeping too much or not enough  Problems with sexual function:   · Difficulty having or keeping an erection    · Vaginal dryness    · A decreased interest in sex  Self care after a stroke:   · Go to rehabilitation (rehab) as directed  Rehab is an important part of treatment  A speech therapist helps you relearn or improve your ability to talk and swallow  You may start slowly and start doing more difficult tasks over time  Physical therapists can help you gain strength and build endurance  Occupational therapists teach you new ways to do daily activities, such as getting dressed  Therapy can help you improve your ability to walk or keep your balance  Your therapy may include tasks or movements you will need to do for everyday activities  An example is being able to raise or lower yourself from a chair  · Prevent falls in your home  Remove anything you might trip over  Tape electrical cords down  Keep paths clear throughout your home  Make sure your home is well lighted  Put nonslip materials on surfaces that might be slippery  An example is your bathtub or shower floor  · Use assistive devices  A cane or walker may help you keep your balance as you walk  · Manage other medical conditions  Manage your heart conditions, blood pressure, and diabetes  Management of these conditions can reduce your risk for another stroke  Take your medicines as directed  Follow your healthcare provider's instructions for diet  · Join a support group  Life after a stroke can be difficult  It may be helpful to talk to others who have had a stroke  There are also support groups for family members or support persons of those whom have had a stroke  Ask your healthcare provider for more information about support groups  Know the signs of depression: Depression can happen after you have a stroke  Depression can lower your quality of life and cause you to stop doing things to help you be stronger  Depression can cause you to become less independent  Know the signs of depression so that you can receive help  Tell your family and friends that if they see these signs, to let your healthcare provider know   You may show any of the following signs of depression:  · Extreme sadness    · Avoiding social interaction with family or friends    · A lack of interest in things you once enjoyed    · Irritability    · Trouble sleeping    · Low energy levels    · A change in eating habits or sudden weight gain or loss  Driving after a stroke:  Do not drive a car without talking to your healthcare provider or occupational therapist  The effects of a stroke may make it difficult or unsafe for you to drive  You may need to have a  evaluation before you can safely and legally drive a car  You may also need to take a  training program or get special equipment installed in your car  Ask your healthcare provider for more information about driving after a stroke  For more information and support:   · National Stroke Association  [address] , [place] [address]  Phone: 6- 695 - 920-5523  Web Address: [employer]  Follow up with your healthcare provider as directed: You may need to come in for regular tests of your brain function  If you are taking warfarin, you will need to come in for regular blood tests  Your INR levels will also need to be checked  These tests help make sure you are taking the right amount of warfarin  Write down your questions so you remember to ask them during your visits  © 2017 [address] Information is for End User's use only and may not be sold, redistributed or otherwise used for commercial purposes  All illustrations and images included in CareNotes® are the copyrighted property of [employer]  or [address]  The above information is an  only  It is not intended as medical advice for individual conditions or treatments  Talk to your doctor, nurse or pharmacist before following any medical regimen to see if it is safe and effective for you  Effects of a Stroke   WHAT YOU NEED TO KNOW:   What are the effects of a stroke?   The effects of a stroke may be different for everyone  They may depend on where the stroke happened in your brain and how much damage occurred there  Some people may make a full recovery  Other people may have long-term effects  It is important to talk to your healthcare provider about any symptoms you have after a stroke  There are medicines and therapies to help manage the effects of a stroke  What should family or support persons know about the effects of a stroke? It is important for family or support persons to know how a stroke has affected your loved one  Know when to call 911, seek immediate care, or call your loved one's healthcare provider  What speech, language, or memory problems can a stroke cause? · Difficulty finding the right words or putting complete sentences together    · Difficulty putting words together that make sense    · Difficulty paying attention or a short attention span    · Difficulty writing or reading    · Problems with memory or being disoriented to time, place, or situation    · Problems thinking clearly or feeling confused    · Difficulty understanding written or spoken language or learning something new  What muscle and nerve problems can a stroke cause? A stroke may affect one side of your body or part of one side  You may be at an increased risk for falling if you have difficulty moving your leg muscles  You may have any of the following:  · Inability to move your arm, leg, or one side of your face (paralysis)    · Muscle weakness, spasms, or muscles that stay in one position (contracted)    · Poor balance, difficulty walking, or difficulty grasping objects    · Changes in your vision or poor vision    · Ignoring, or being unaware of one side of your body    · Numbness of your arm, hand, fingers, leg, foot, or toes    · Pain, tickling, or prickling in weak or paralyzed parts of your body  What bowel and bladder problems can a stroke cause?    · Loss of control of your urine or bowel movements    · Feeling like you have to urinate frequently, even when your bladder is not full    · Difficulty emptying your bladder or constipation  What swallowing and eating problems can a stroke cause? · Difficulty swallowing food    · Difficulty feeding yourself    · A lack of taste or a change in the way you taste things    · An increased risk for aspiration (food moves into the lungs) and pneumonia due to swallowing problems  What changes in personality or mood can a stroke cause? · Depression, sadness, irritability, or hopelessness    · Anger, frustration, or anxiety    · Difficulty controlling emotions or expressing inappropriate emotions    · Quick mood changes  What problems with fatigue can a stroke cause? · Low energy levels    · Tiring easily    · A lack of motivation  What sleeping problems can a stroke cause? · Development of sleep apnea    · Changes in sleep such as sleeping too much or not enough  Where can I find support and more information? · National Stroke Association  [address] , [place] [address]  Phone: 5- 826 - 644-3779  Web Address: [employer] [facility]  What problems with sexual function can a stroke cause? · Difficulty having or keeping an erection    · Vaginal dryness    · A decreased interest in sex  Call 911 or have someone else call for any of the following:   · You have any of the following signs of a stroke:      ¨ Numbness or drooping on one side of your face     ¨ Weakness in an arm or leg    ¨ Confusion or difficulty speaking    ¨ Dizziness, a severe headache, or vision loss         · You cannot be woken up  · You fall and hit your head  · You have a seizure  · You feel lightheaded, short of breath, and have chest pain  · You cough up blood  When should I seek immediate care? · Your arm or leg is painful, red, or larger than normal      · You feel weak, dizzy, or faint  · You have a fall without hitting your head       · Your blood sugar level or blood pressure is higher or lower than your healthcare provider said it should be  · You bleed heavily or cannot stop bleeding from a cut or injury  · You have a new, severe headache  When should I contact my healthcare provider? · You have a fever  · You have a rash  · You have new or worsening symptoms  · You feel extremely sad or anxious  · You feel you are unable to cope with your condition  · You have trouble sleeping  · You have open sores  · You choke or cough when eating or drinking  · You have questions or concerns about your condition or care  CARE AGREEMENT:   You have the right to help plan your care  Learn about your health condition and how it may be treated  Discuss treatment options with your caregivers to decide what care you want to receive  You always have the right to refuse treatment  The above information is an  only  It is not intended as medical advice for individual conditions or treatments  Talk to your doctor, nurse or pharmacist before following any medical regimen to see if it is safe and effective for you  © 2017 [address] Information is for End User's use only and may not be sold, redistributed or otherwise used for commercial purposes  All illustrations and images included in CareNotes® are the copyrighted property of [employer] M [employer]  or [employer] Hancock  WHAT YOU NEED TO KNOW:   Hypertension is high blood pressure (BP)  Your BP is the force of your blood moving against the walls of your arteries  Normal BP is less than 120/80  Prehypertension is between 120/80 and 139/89  Hypertension is 140/90 or higher  Hypertension causes your BP to get so high that your heart has to work much harder than normal  This can damage your heart  Chronic hypertension is a long-term condition that you can control with a healthy lifestyle or medicines   A controlled blood pressure helps protect your organs, such as your heart, lungs, brain, and kidneys  DISCHARGE INSTRUCTIONS:   Call 911 for any of the following:   · You have discomfort in your chest that feels like squeezing, pressure, fullness, or pain  · You become confused or have difficulty speaking  · You suddenly feel lightheaded or have trouble breathing  · You have pain or discomfort in your back, neck, jaw, stomach, or arm  Seek care immediately if:   · You have a severe headache or vision loss  · You have weakness in an arm or leg  Contact your healthcare provider if:   · You feel faint, dizzy, confused, or drowsy  · You have been taking your BP medicine and your BP is still higher than your healthcare provider says it should be  · You have questions or concerns about your condition or care  Medicines: You may need any of the following:  · Medicine  may be used to help lower your BP  You may need more than one type of medicine  Take the medicine exactly as directed  · Diuretics  help decrease extra fluid that collects in your body  This will help lower your BP  You may urinate more often while you take this medicine  · Cholesterol medicine  helps lower your cholesterol level  A low cholesterol level helps prevent heart disease and makes it easier to control your blood pressure  · Take your medicine as directed  Contact your healthcare provider if you think your medicine is not helping or if you have side effects  Tell him or her if you are allergic to any medicine  Keep a list of the medicines, vitamins, and herbs you take  Include the amounts, and when and why you take them  Bring the list or the pill bottles to follow-up visits  Carry your medicine list with you in case of an emergency  Follow up with your healthcare provider as directed: You will need to return to have your blood pressure checked and to have other lab tests done  Write down your questions so you remember to ask them during your visits     Manage chronic hypertension:  Talk with your healthcare provider about these and other ways to manage hypertension:  · Take your BP at home  Sit and rest for 5 minutes before you take your BP  Extend your arm and support it on a flat surface  Your arm should be at the same level as your heart  Follow the directions that came with your BP monitor  If possible, take at least 2 BP readings each time  Take your BP at least twice a day at the same times each day, such as morning and evening  Keep a record of your BP readings and bring it to your follow-up visits  Ask your healthcare provider what your blood pressure should be  · Limit sodium (salt) as directed  Too much sodium can affect your fluid balance  Check labels to find low-sodium or no-salt-added foods  Some low-sodium foods use potassium salts for flavor  Too much potassium can also cause health problems  Your healthcare provider will tell you how much sodium and potassium are safe for you to have in a day  He or she may recommend that you limit sodium to 2,300 mg a day  · Follow the meal plan recommended by your healthcare provider  A dietitian or your provider can give you more information on low-sodium plans or the DASH (Dietary Approaches to Stop Hypertension) eating plan  The DASH plan is low in sodium, unhealthy fats, and total fat  It is high in potassium, calcium, and fiber  · Exercise to maintain a healthy weight  Exercise at least 30 minutes per day, on most days of the week  This will help decrease your blood pressure  Ask about the best exercise plan for you  · Decrease stress  This may help lower your BP  Learn ways to relax, such as deep breathing or listening to music  · Limit alcohol  Women should limit alcohol to 1 drink a day  Men should limit alcohol to 2 drinks a day  A drink of alcohol is 12 ounces of beer, 5 ounces of wine, or 1½ ounces of liquor  · Do not smoke    Nicotine and other chemicals in cigarettes and cigars can increase your BP and also cause lung damage  Ask your healthcare provider for information if you currently smoke and need help to quit  E-cigarettes or smokeless tobacco still contain nicotine  Talk to your healthcare provider before you use these products  © 2017 [address] Information is for End User's use only and may not be sold, redistributed or otherwise used for commercial purposes  All illustrations and images included in CareNotes® are the copyrighted property of A D A M [employer] Inc  or Milo Hancock  The above information is an  only  It is not intended as medical advice for individual conditions or treatments  Talk to your doctor, nurse or pharmacist before following any medical regimen to see if it is safe and effective for you

## 2017-10-12 NOTE — ANESTHESIA POSTPROCEDURE EVALUATION
Post-Op Assessment Note      CV Status:  Stable    Mental Status:  Alert and awake    Hydration Status:  Euvolemic    PONV Controlled:  Controlled    Airway Patency:  Patent    Post Op Vitals Reviewed: Yes          Staff: CRNA           /69 (10/12/17 1435)    Temp      Pulse 102 (10/12/17 1435)   Resp 20 (10/12/17 1435)    SpO2 96 % (10/12/17 1435)

## 2017-10-12 NOTE — PHYSICAL THERAPY NOTE
Physical Therapy Cancellation Note    Patient off the floor hence PT session cancelled this p m  Will continue to follow as able

## 2017-10-12 NOTE — PLAN OF CARE
Problem: OCCUPATIONAL THERAPY ADULT  Goal: Performs self-care activities at highest level of function for planned discharge setting  See evaluation for individualized goals  Treatment Interventions: ADL retraining, Functional transfer training, UE strengthening/ROM, Cognitive reorientation, Endurance training, Patient/family training, Equipment evaluation/education, Compensatory technique education, Neuromuscular reeducation, Continued evaluation, Energy conservation, Activityengagement          See flowsheet documentation for full assessment, interventions and recommendations  Outcome: Completed Date Met: 10/12/17  Limitation: Decreased Safe judgement during ADL, Decreased cognition, Decreased high-level ADLs, Decreased ADL status  Prognosis: Good  Assessment: Patient participated in skilled OT with focus on cognitive reorienation skills, functional mobility skills, activity tolerance/endurance, Patient continues to verbalize concerns over "pair of lost boots"  Obtained information for patient to follow through with  Patient pleasant and cooperative, appears slow with comprehension at times, however, does clarify information for better carryover  Patient would benefit from continued therapy on an Outpatient basis  Patient does not require further inpatient OT at this time and will be taken off caseload        OT Discharge Recommendation: Outpatient OT  OT - OK to Discharge: Yes (when medically cleared)

## 2017-10-12 NOTE — PROGRESS NOTES
[facility] Internal Medicine Progress Note  Patient: Chato Pearson 50 y o  male   MRN: [id number]  PCP: Meet Tsai DO  Unit/Bed#: PPHP 729-01 Encounter: [id number]  Date Of Visit: 10/12/17    Assessment:    Principal Problem:    Cerebrovascular accident (CVA) due to embolism of middle cerebral artery ([address] )  Active Problems:    Hypertension    CAD (coronary artery disease)    S/P CABG (coronary artery bypass graft)      Plan:    · Left M2 Branch CVA status post tPA:  · - evolving into M3 break occlusion too distal for for retrieval repeat CT head showing left lateral temporal ischemia  · - appreciate Neurology  · - KRISTINA, patient initially refused, still pending results  · - echocardiogram with EF of 20% severe diffuse hypokinesis mild concentric hypertrophy, no apical thrombus  · - CT head with evolving infarct in the left lateral temporal lobe  No evidence of space-occupying hematoma or mass effect  · IR cerebral angiography:  With interval distal propagation of previously seen left MCA M2 thrombus now seen in and M 3 branch  No thrombectomy was performed due to the distal occlusion and minimal symptoms  · Pt refusing MRI still continues to refuse   · - continue asa statin   ·   · Hypertension:  · - continue metoprolol  ·   · Coronary artery disease/ischemic cardiomyopathy:  · - with EF of 20%/CABG  · - follow up outpatient with Cardiology  ·   · Nicotine abuse:  · - continue patch  · - smoking cessation education      VTE Pharmacologic Prophylaxis:   Pharmacologic: Heparin  Mechanical VTE Prophylaxis in Place: Yes    Patient Centered Rounds: I have performed bedside rounds with nursing staff today  Discussions with Specialists or Other Care Team Provider: nursing     Education and Discussions with Family / Patient:     Time Spent for Care: 30 minutes  More than 50% of total time spent on counseling and coordination of care as described above      Current Length of Stay: 3 day(s)    Current Patient Status: Inpatient   Certification Statement: The patient will continue to require additional inpatient hospital stay due to ongoing management still at [facility] pending return     Discharge Plan / Estimated Discharge Date: pending     Code Status: Level 1 - Full Code       Subjective:   Pt is angry aggressive in regards to his plan of care  states he will not stay and wants imaging read now will not wait  No pain no nauseas  Objective:     Vitals:   Temp (24hrs), Av 1 °F (36 7 °C), Min:97 8 °F (36 6 °C), Max:98 5 °F (36 9 °C)    HR:  [] 102  Resp:  [16-20] 20  BP: (103-128)/(62-74) 103/69  SpO2:  [96 %-100 %] 96 %  Body mass index is 30 44 kg/m²  Input and Output Summary (last 24 hours): Intake/Output Summary (Last 24 hours) at 10/12/17 1457  Last data filed at 10/12/17 1425   Gross per 24 hour   Intake              550 ml   Output                0 ml   Net              550 ml       Physical Exam:     Physical Exam   Constitutional: He is oriented to person, place, and time  He appears well-developed and well-nourished  No distress  HENT:   Head: Normocephalic and atraumatic  Mouth/Throat: No oropharyngeal exudate  Eyes: Conjunctivae are normal  Pupils are equal, round, and reactive to light  Right eye exhibits no discharge  Left eye exhibits no discharge  No scleral icterus  Neck: No JVD present  No tracheal deviation present  No thyromegaly present  Cardiovascular: Regular rhythm  Exam reveals no gallop and no friction rub  No murmur heard  Pulmonary/Chest: No respiratory distress  He has no wheezes  He has no rales  He exhibits no tenderness  Abdominal: He exhibits no distension and no mass  There is no tenderness  There is no rebound and no guarding  Musculoskeletal: He exhibits no edema, tenderness or deformity  Neurological: He is oriented to person, place, and time  Skin: No rash noted  He is not diaphoretic  No erythema  No pallor     Psychiatric:   Angry aggressive, Additional Data:     Labs:      Results from last 7 days  Lab Units 10/11/17  0557   WBC Thousand/uL 13 66*   HEMOGLOBIN g/dL 15 1   HEMATOCRIT % 44 4   PLATELETS Thousands/uL 196   NEUTROS PCT % 63   LYMPHS PCT % 26   MONOS PCT % 10   EOS PCT % 1       Results from last 7 days  Lab Units 10/11/17  0557  10/10/17  0058   SODIUM mmol/L 139  < > 141   POTASSIUM mmol/L 3 7  < > 3 8   CHLORIDE mmol/L 107  < > 109*   CO2 mmol/L 25  < > 25   BUN mg/dL 14  < > 11   CREATININE mg/dL 0 75  < > 0 76   CALCIUM mg/dL 8 4  < > 8 1*   TOTAL PROTEIN g/dL  --   --  6 8   BILIRUBIN TOTAL mg/dL  --   --  0 27   ALK PHOS U/L  --   --  95   ALT U/L  --   --  33   AST U/L  --   --  16   GLUCOSE RANDOM mg/dL 112  < > 123   < > = values in this interval not displayed  Results from last 7 days  Lab Units 10/10/17  0058   INR  1 03       * I Have Reviewed All Lab Data Listed Above  * Additional Pertinent Lab Tests Reviewed: All Labs Within Last 24 Hours Reviewed    Imaging:    Imaging Reports Reviewed Today Include: reviewed      Recent Cultures (last 7 days):           Last 24 Hours Medication List:     aspirin 81 mg Oral Daily   atorvastatin 80 mg Oral QPM   chlorhexidine 15 mL Swish & Spit Q12H [address]   heparin (porcine) 5,000 Units Subcutaneous Q8H [address]   metoprolol tartrate 12 5 mg Oral Q12H [address]   nicotine 1 patch Transdermal Daily        Today, Patient Was Seen By: Loree Krabbe, CRNP    ** Please Note: This note has been constructed using a voice recognition system   **

## 2017-10-12 NOTE — OCCUPATIONAL THERAPY NOTE
Occupational Therapy Treatment Note:       10/12/17 1016   Pain Assessment   Pain Assessment 0-10   Pain Score No Pain   ADL   Where Assessed Other (Comment)  (pt amb in hallway; reports performing independently)   Grooming Assistance 7  Independent   UB Bathing Assistance 6  Modified Independent   LB Bathing Assistance 6  Modified Independent   UB Dressing Assistance 6  Modified independent   [facility] 6  Modified independent   Toileting Assistance  6  Modified independent   Bed Mobility   Rolling R 7  Independent   Rolling L 7  Independent   Supine to Sit 7  Independent   Sit to Supine 7  Independent   Transfers   Sit to Stand 6  Modified independent   Stand to Sit 6  Modified independent   Stand pivot 6  Modified independent   Functional Mobility   Functional Mobility 6  Modified independent   Additional Comments amb in hallways independently   Toilet Transfers   Toilet Transfer Type To and from   Toilet Transfer Technique Ambulating   Toilet Transfers Modified independence   Cognition   Overall Cognitive Status Impaired   Arousal/Participation Alert   Attention Within functional limits   Orientation Level Oriented X4   Memory Decreased recall of recent events   Following Commands Follows one step commands without difficulty   Comments word finding deficits   Activity Tolerance   Activity Tolerance Patient tolerated treatment well   Assessment   Assessment Patient participated in skilled OT with focus on cognitive reorienation skills, functional mobility skills, activity tolerance/endurance, Patient continues to verbalize concerns over "pair of lost boots"  Obtained information for patient to follow through with  Patient pleasant and cooperative, appears slow with comprehension at times, however, does clarify information for better carryover  Patient would benefit from continued therapy on an Outpatient basis  Plan   Treatment Interventions ADL retraining;Functional transfer training; Endurance training;Cognitive reorientation; Compensatory technique education; Energy conservation   Goal Expiration Date 10/20/17   Treatment Day 2   OT Frequency 3-5x/wk   Recommendation   OT Discharge Recommendation Outpatient OT   OT - OK to Discharge Yes  (when medically cleared)   LELE Mackenzie

## 2017-10-12 NOTE — DISCHARGE SUMMARY
Discharge Summary - [facility] Internal Medicine    Patient Information: Micheal Rosales 50 y o  male MRN: [id number]  Unit/Bed#: PPHP 729-01 Encounter: [id number]    Discharging Physician / Practitioner: Juan M Peraza  PCP: Daniele Coulter DO  Admission Date: 10/9/2017  Discharge Date: 10/12/17    Reason for Admission: ischemic stroke     Discharge Diagnoses:     Principal Problem:    Cerebrovascular accident (CVA) due to embolism of middle cerebral artery ([address] )  Active Problems:    Hypertension    CAD (coronary artery disease)    S/P CABG (coronary artery bypass graft)  Resolved Problems:    Confusion      Consultations During Hospital Stay:  · Dr Yamileth Griggs neurology     Procedures Performed:     · Echocardiogram: Ef 20%, severe diffuse hypokinesis  Mild concentric hypertrophy  Diastolic dysfunction with normal LV filling pressures  No evidence of apical thrombus  Left atrium moderately dilated  No evidence of stenosis no pericardial effusion  · KRISTINA:  Unofficial report, per Dr Conrado Vasquez  No PFO no clots  EF 20% per echo  · Cholesterol 216, triglycerides 220 HDL 29     Significant Findings / Test Results:     · See above    Incidental Findings:   · None    Test Results Pending at Discharge (will require follow up):   · No      Outpatient Tests Requested:  · Follow up with pcp    Complications:  None     Hospital Course:     Micheal Rosales is a 50 y o  male patient who originally presented to the hospital on 10/9/2017 due to acute altered slurred speech and some dysarthria  Patient was a transfer from [facility] for ischemic stroke  Patient seemed to be confused according to wife who witnessed the event  Mild left-sided facial droop on presentation to the emergency room  He had some receptive aphasia was unable to name common objects  Neurology were consulted for further evaluation  Interventional Radiology patient was tPA bed and transferred to [place] for IR intervention    Per the IR note there was a left MCA and 3 occlusions through no embolectomy was performed and patient was brought to neuro ICU for monitoring  On arrival to the emergency room/ICU patient was mildly confused, slow to answer but otherwise  Nonfocal   On exam patient was able to follow commands  Patient was seen by Neurology who recommended repeat CT 24 hours post IV tPA infusion, which was stable  No antiplatelet agents were recommended for 8:54 p m  four hours post CT scan  Patient has been started on aspirin 81 mg daily  A 2D echo was done which demonstrated the above findings with an EF of 20%  Thomas Ormond was performed with no further findings of blood clot or PFO  Still noted to have low EF  Patient was unable to have MRI of the brain as he consistently refused  Pt has been placed on Lipitor 80 mg daily, and asa 81mg po daily as well as metoprolol 12 5mg  Pt is however, very reluctant to take medications  Patient is refusing at this time to stay in the hospital for further evaluation  I have discussed this with Neurology at this time  Patient was also difficult with Neurology as of yesterday  He is absolutely refusing to stay at this time  I have discussed following up with Neurology as well as Cardiology secondary to his low EF  Patient states that he used to follow up with the cardiologist, however he was feeling very dizzy and lightheaded and kept offering these complaints but was never heard  He then just stopped taking his medications and no longer followed with the cardiologist   I have discussed the severity of what is going on with the patient  For some reason he does not seem to comprehend the full magnitude  He just keeps insisting that he wants to return home  He states that his wife talked him into staying 1 more day and we to had mentioned to him that he could go today    I have called Dr Sebas Cruz of Cardiology, , who has offered me prelim results for KRISTINA with no acute findings except for ongoing low ejection fraction  At this point I have discussed this with Neurology, patient will need to follow up as an outpatient and is imperative that patient follow with Cardiology for ongoing treatment  Patient has also been informed that he will need to be discharged on Coumadin starting today 10/12/2017 we will start patient on 3 mg Coumadin to have blood work drawn on 10/15/2017 results to Dr Conrado Vasquez office   Patient has assured me that he will call to schedule an follow-up appointment and try to be compliant with medications  Smoking cessation has been discussed with the patient who seems to understand that this is 1 of his highest risk factors and he will attempt to try and stop  Dr Augustin Zambrano, went in to discuss this plan with the patient who agrees to be compliant with plan as laid forth  Condition at Discharge: fair     Discharge Day Visit / Exam:     * Please refer to separate progress note for these details *    Discussion with Family: wife not at bedside       Discharge instructions/Information to patient and family:   See after visit summary for information provided to patient and family  Provisions for Follow-Up Care:  See after visit summary for information related to follow-up care and any pertinent home health orders  Disposition:     Home    For Discharges to [facility] SNF:   · Not Applicable to this Patient - Not Applicable to this Patient    Planned Readmission: high risk for severe stroke     Discharge Statement:  I spent 60 minutes discharging the patient  This time was spent on the day of discharge  I had direct contact with the patient on the day of discharge  Greater than 50% of the total time was spent examining patient, answering all patient questions, arranging and discussing plan of care with patient as well as directly providing post-discharge instructions  Additional time then spent on discharge activities      Discharge Medications:  See after visit summary for reconciled discharge medications provided to patient and family  ** Please Note: This note has been constructed using a voice recognition system   **

## 2017-10-12 NOTE — ANESTHESIA PREPROCEDURE EVALUATION
Review of Systems/Medical History  Patient summary reviewed    No history of anesthetic complications     Cardiovascular  Negative cardio ROS Exercise tolerance: good,  Hypertension , CAD, , History of CABG, Aortic disease (hx endovascular stent graft),   Comment: 10/10/2017 Echo: SUMMARY      LEFT VENTRICLE:  The ventricle was moderately dilated  Systolic function was severely reduced  Ejection fraction was estimated to be 20 %  There was severe diffuse hypokinesis  There was mild concentric hypertrophy  Diastolic dysfunction with normal LV filling pressures  No evidence of apical thrombus      RIGHT VENTRICLE:  The ventricle was mildly dilated  Systolic function was mildly reduced      LEFT ATRIUM:  The atrium was moderately dilated      MITRAL VALVE:  There was mild regurgitation      AORTIC VALVE:  The valve was not visualized well enough to rule out a bicuspid morphology  Leaflets exhibited normal thickness and normal cuspal separation  ,  Pulmonary  Negative pulmonary ROS Smoker cigarette smoker more than 10 packs per year , Tobacco cessation counseling given, No shortness of breath, No recent URI , ,        GI/Hepatic  Negative GI/hepatic ROS          Negative  ROS        Endo/Other  Negative endo/other ROS      GYN  Negative gynecology ROS          Hematology  Negative hematology ROS      Musculoskeletal  Negative musculoskeletal ROS        Neurology  Negative neurology ROS   CVA , no residual symptoms,    Psychology   Negative psychology ROS            Physical Exam    Airway    Mallampati score: II  TM Distance: >3 FB  Neck ROM: full     Dental   Comment: Dentition in poor condition, several missing and chipped, No notable dental hx     Cardiovascular  Comment: Negative ROS,     Pulmonary  Breath sounds clear to auscultation,     Other Findings        Anesthesia Plan  ASA Score- 3       Anesthesia Type- IV sedation with anesthesia with ASA Monitors     Additional Monitors:   Airway Plan: Comment: I have seen the patient and reviewed the history  Patient to receive IV sedation with full ASA monitors  Risks discussed with the patient, consent signed          Induction- intravenous  Informed Consent- Anesthetic plan and risks discussed with patient

## 2017-10-12 NOTE — PROGRESS NOTES
pts missing boots were last seen by ambulance crew upon transfer  from [email or web address] to [address] [place] [address] radiology department  No answer in ir presently   Nurse manager romie magdaleno to be notified in am to f/u about missing belongings and contact pt about resolution of situation

## 2017-10-12 NOTE — PROGRESS NOTES
Progress Note - Neurology   Coco Burris 50 y o  male MRN: [id number]  [facility] Encounter: [id number]    Assessment:  49 y/o male with history of CAD/CABG who presented initially to [facility] as stoke alert with expressive aphasia and confusion  CT head was negative for acute abnormalities, but did suggest potential left M2 branch occlusion, which was confirmed on CTA head/neck  tPA administered following aggressive BP lowering and patient went for endovascular intervention, which revealed L M3 branch occlusion, no thrombectomy performed as deemed to distal for retrieval  Repeat head CT was performed with evidence of L lateral temporal ischemia  Echo performed revealed EF of 20%, severe hypokinesis, and atrial dilation  Plan:  1  Patient refused MRI yesterday  KRISTINA performed today, with no evidence of PFO or cardiac thrombus, EF of 20-25%  Ideally would have liked to obtain MRI brain to assess stroke burden (patient refused), but given patient exhibiting non focal exam, anticipate small stroke burden  Discussed with attending neurologist and primary team, will recommend start Coumadin, and continue aspirin/statin with patient's cardiac history  The importance of continuing these medications was explained extensively to patient, given his markedly low EF and risk for further cardioembolic events  Also stressed the importance of compliance with INR monitoring and follow up with PCP and vascular neurology  Patient voiced understanding  2  Will arrange follow up with vascular neurology as outpatient  No further inpatient neurologic workup  Subjective:   Patient resting in bedside chair, doing ok today, anxious to go home  No acute changes or new neurologic symptoms endorsed  ROS:  Denies headache, acute vision/speech changes, chest pain, SOB, abdominal pain, N/V, fever/chills  Vitals: Blood pressure 120/73, pulse 78, temperature 97 9 °F (36 6 °C), temperature source Oral, resp   rate 20, height 5' 11" (1 803 m), weight 99 kg (218 lb 4 1 oz), SpO2 99 %  ,Body mass index is 30 44 kg/m²  Physical Exam:   Physical Exam   Constitutional: He is oriented to person, place, and time  He appears well-developed and well-nourished  HENT:   Head: Normocephalic and atraumatic  Eyes: Conjunctivae and EOM are normal  Pupils are equal, round, and reactive to light  Neck: Normal range of motion  Neck supple  Cardiovascular: Normal rate and regular rhythm  Pulmonary/Chest: Effort normal and breath sounds normal    Abdominal: Soft  Bowel sounds are normal    Musculoskeletal: Normal range of motion  Neurological: He is alert and oriented to person, place, and time  He has a normal Finger-Nose-Finger Test and a normal Heel to [employer]  Gait normal    Skin: Skin is warm and dry  Psychiatric: His speech is normal       Neurologic Exam     Mental Status   Oriented to person, place, and time  Follows 2 step commands  Attention: normal  Concentration: normal    Speech: speech is normal   Knowledge: good  Able to perform simple calculations  Able to read  Able to repeat  Normal comprehension  Cranial Nerves     CN II   Visual fields full to confrontation  CN III, IV, VI   Pupils are equal, round, and reactive to light  Extraocular motions are normal      CN V   Facial sensation intact  CN VII   Facial expression full, symmetric  CN VIII   CN VIII normal      CN IX, X   CN IX normal    CN X normal      CN XI   CN XI normal      CN XII   CN XII normal      Motor Exam   Muscle bulk: normal  Overall muscle tone: normal  Right arm pronator drift: absent  Left arm pronator drift: absent  5/5 UE and LE throughout bilaterally        Sensory Exam   Light touch normal      Gait, Coordination, and Reflexes     Gait  Gait: normal    Coordination   Finger to nose coordination: normal  Heel to shin coordination: normal      Lab, Imaging and other studies:   KRISTINA 10/12/17: LEFT VENTRICLE: Size was normal  Systolic function was severely reduced  Ejection fraction was estimated to be 20 - 25 %  There was severe diffuse hypokinesis  Concentric hypertrophy was present      RIGHT VENTRICLE: The size was normal  Systolic function was normal      LEFT ATRIUM: Size was normal  No thrombus was identified  APPENDAGE: The size was normal  No thrombus was identified  DOPPLER: The function was normal (normal emptying velocity)      ATRIAL SEPTUM: No defect or patent foramen ovale was identified by color Doppler or after injection of agitated saline      RIGHT ATRIUM: Size was normal  No thrombus was identified      MITRAL VALVE: Valve structure was normal  There was normal leaflet separation  There was no echocardiographic evidence of vegetation  DOPPLER: There was mild regurgitation      AORTIC VALVE: The valve was trileaflet  Leaflets exhibited normal thickness, mild calcification, normal cuspal separation, and sclerosis  There was no echocardiographic evidence of vegetation  DOPPLER: There was no regurgitation      TRICUSPID VALVE: The valve structure was normal  There was normal leaflet separation  There was no echocardiographic evidence of vegetation  DOPPLER: There was no significant regurgitation      PULMONIC VALVE: Leaflets exhibited normal thickness, no calcification, and normal cuspal separation  DOPPLER: The transpulmonic velocity was within the normal range  There was mild regurgitation      PERICARDIUM: There was no pericardial effusion  The pericardium was normal in appearance      AORTA: The root exhibited normal size    CBC:   Results from last 7 days  Lab Units 10/11/17  0557 10/10/17  0532 10/10/17  0058   WBC Thousand/uL 13 66* 13 09* 12 56*   RBC Million/uL 5 11 5 09 4 99   HEMOGLOBIN g/dL 15 1 14 8 14 9   HEMATOCRIT % 44 4 44 6 42 7   MCV fL 87 88 86   PLATELETS Thousands/uL 196 202 194   , BMP/CMP:   Results from last 7 days  Lab Units 10/11/17  0557 10/10/17  0629 10/10/17  0058  10/09/17  1747 SODIUM mmol/L 139 140 141  --  139   POTASSIUM mmol/L 3 7 3 9 3 8  --  4 2   CHLORIDE mmol/L 107 108 109*  --  105   CO2 mmol/L 25 25 25  --  27   ANION GAP mmol/L 7 7 7  --  7   BUN mg/dL 14 9 11  --  16   CREATININE mg/dL 0 75 0 78 0 76  --  0 86   GLUCOSE RANDOM mg/dL 112 112 123  --  127   GLUCOSE, ISTAT   --   --   --   < >  --    CALCIUM mg/dL 8 4 8 4 8 1*  --  8 7   AST U/L  --   --  16  --  22   ALT U/L  --   --  33  --  44   ALK PHOS U/L  --   --  95  --  114   TOTAL PROTEIN g/dL  --   --  6 8  --  7 4   ALBUMIN g/dL  --   --  3 4*  --  3 9   BILIRUBIN TOTAL mg/dL  --   --  0 27  --  0 30   EGFR ml/min/1 73sq m 107 105 106  < > 101   < > = values in this interval not displayed  , HgBA1C:   , Lipid Profile:   Results from last 7 days  Lab Units 10/10/17  0629   HDL mg/dL 29*   CHOLESTEROL mg/dL 216*   LDL CALC mg/dL 143*   TRIGLYCERIDES mg/dL 220*     VTE Prophylaxis: Sequential compression device (Venodyne)  and Heparin    Counseling / Coordination of Care  Total time spent today 30 minutes  Greater than 50% of total time was spent with the patient and / or family counseling and / or coordination of care  A description of the counseling / coordination of care: Discussed with family and patient importance of compliance with anticoagulation given markedly low ED and high risk for further embolic CVA's  Also counseled patient extensively on INR monitoring compliance  Will arrange vascular neurology follow up

## 2017-10-13 NOTE — SPEECH THERAPY NOTE
Speech Language/Pathology  Stopped to see pt for speech services  Pt anxious appearing- pacing in room, legs shaking when seated  States repeatedly he just wants to go home to "rest his brain"  He states he does not want to take unnecessary meds & just wants to get sleep  He states he does not know why he had a stroke but that we are not doing anything at this point & he wants no further tests  No formal tasks for speech were offered given his current state  Continue to rec'd outpt speech services as the pt also states he wants to return to 100%  He states this at the same time he does not want intervention  ? Full understanding of situation

## 2017-10-17 ENCOUNTER — TRANSCRIBE ORDERS (OUTPATIENT)
Dept: ADMINISTRATIVE | Facility: HOSPITAL | Age: 52
End: 2017-10-17

## 2017-10-17 ENCOUNTER — ALLSCRIPTS OFFICE VISIT (OUTPATIENT)
Dept: OTHER | Facility: OTHER | Age: 52
End: 2017-10-17

## 2017-10-17 DIAGNOSIS — I25.10 ATHEROSCLEROSIS OF NATIVE CORONARY ARTERY OF NATIVE HEART, ANGINA PRESENCE UNSPECIFIED: Primary | ICD-10-CM

## 2017-10-19 ENCOUNTER — ALLSCRIPTS OFFICE VISIT (OUTPATIENT)
Dept: OTHER | Facility: OTHER | Age: 52
End: 2017-10-19

## 2017-10-23 ENCOUNTER — GENERIC CONVERSION - ENCOUNTER (OUTPATIENT)
Dept: OTHER | Facility: OTHER | Age: 52
End: 2017-10-23

## 2017-10-23 ENCOUNTER — APPOINTMENT (OUTPATIENT)
Dept: LAB | Facility: MEDICAL CENTER | Age: 52
End: 2017-10-23
Payer: COMMERCIAL

## 2017-10-23 DIAGNOSIS — I63.9 CEREBRAL INFARCTION (HCC): ICD-10-CM

## 2017-10-23 LAB
INR PPP: 1.41 (ref 0.86–1.16)
PROTHROMBIN TIME: 17.3 SECONDS (ref 12.1–14.4)

## 2017-10-23 PROCEDURE — 85610 PROTHROMBIN TIME: CPT

## 2017-10-23 PROCEDURE — 36415 COLL VENOUS BLD VENIPUNCTURE: CPT

## 2017-10-23 NOTE — CASE MANAGEMENT
Notification of Discharge  This is a Notification of Discharge from our facility [address]  Please be advised that this patient has been discharge from our facility  Below you will find the admission and discharge date and time including the patients disposition  PRESENTATION DATE: 10/9/2017 10:02 PM  IP ADMISSION DATE: 10/9/17 2202  DISCHARGE DATE: 10/12/2017  6:11 PM  DISPOSITION: Home/Self Care    [facility] in the [facility] by Milo Hancock for 2017  Network Utilization Review Department  Phone: [phone]; Fax [phone]  ATTENTION: The Network Utilization Review Department is now centralized for our 7 Facilities  Make a note that we have a new phone and fax numbers for our Department  Please call with any questions or concerns to [phone] and carefully follow the prompts so that you are directed to the right person  All voicemails are confidential  Fax any determinations, approvals, denials, and requests for initial or continue stay review clinical to [phone]  Due to HIGH CALL volume, it would be easier if you could please send faxed requests to expedite your requests and in part, help us provide discharge notifications faster

## 2017-10-25 ENCOUNTER — APPOINTMENT (OUTPATIENT)
Dept: OCCUPATIONAL THERAPY | Facility: CLINIC | Age: 52
End: 2017-10-25
Payer: COMMERCIAL

## 2017-10-25 PROCEDURE — G8990 OTHER PT/OT CURRENT STATUS: HCPCS

## 2017-10-25 PROCEDURE — G8991 OTHER PT/OT GOAL STATUS: HCPCS

## 2017-10-25 PROCEDURE — 97166 OT EVAL MOD COMPLEX 45 MIN: CPT

## 2017-10-26 ENCOUNTER — APPOINTMENT (OUTPATIENT)
Dept: LAB | Facility: MEDICAL CENTER | Age: 52
End: 2017-10-26
Payer: COMMERCIAL

## 2017-10-26 ENCOUNTER — APPOINTMENT (OUTPATIENT)
Dept: SPEECH THERAPY | Facility: CLINIC | Age: 52
End: 2017-10-26
Payer: COMMERCIAL

## 2017-10-26 DIAGNOSIS — I63.9 CEREBRAL INFARCTION (HCC): ICD-10-CM

## 2017-10-26 LAB
INR PPP: 2.17 (ref 0.86–1.16)
PROTHROMBIN TIME: 24.4 SECONDS (ref 12.1–14.4)

## 2017-10-26 PROCEDURE — 36415 COLL VENOUS BLD VENIPUNCTURE: CPT

## 2017-10-26 PROCEDURE — G9168 MEMORY CURRENT STATUS: HCPCS

## 2017-10-26 PROCEDURE — G9169 MEMORY GOAL STATUS: HCPCS

## 2017-10-26 PROCEDURE — 85610 PROTHROMBIN TIME: CPT

## 2017-10-26 PROCEDURE — 96125 COGNITIVE TEST BY HC PRO: CPT

## 2017-10-27 ENCOUNTER — GENERIC CONVERSION - ENCOUNTER (OUTPATIENT)
Dept: OTHER | Facility: OTHER | Age: 52
End: 2017-10-27

## 2017-10-30 ENCOUNTER — APPOINTMENT (OUTPATIENT)
Dept: LAB | Facility: MEDICAL CENTER | Age: 52
End: 2017-10-30
Payer: COMMERCIAL

## 2017-10-30 ENCOUNTER — GENERIC CONVERSION - ENCOUNTER (OUTPATIENT)
Dept: OTHER | Facility: OTHER | Age: 52
End: 2017-10-30

## 2017-10-30 DIAGNOSIS — I63.9 CEREBRAL INFARCTION (HCC): ICD-10-CM

## 2017-10-30 LAB
INR PPP: 2.72 (ref 0.86–1.16)
PROTHROMBIN TIME: 29.2 SECONDS (ref 12.1–14.4)

## 2017-10-30 PROCEDURE — 85610 PROTHROMBIN TIME: CPT

## 2017-10-30 PROCEDURE — 36415 COLL VENOUS BLD VENIPUNCTURE: CPT

## 2017-10-31 ENCOUNTER — APPOINTMENT (OUTPATIENT)
Dept: OCCUPATIONAL THERAPY | Facility: CLINIC | Age: 52
End: 2017-10-31
Payer: COMMERCIAL

## 2017-10-31 PROCEDURE — 97535 SELF CARE MNGMENT TRAINING: CPT

## 2017-11-01 NOTE — CONSULTS
Assessment    1  CAD (coronary artery disease) (414 00) (I25 10)  2  Hypertension (401 9) (I10)  3  Stroke (434 91) (I63 9)  4  Cardiomyopathy (425 4) (I42 9)  5  Dyslipidemia (272 4) (E78 5)    Plan  CAD (coronary artery disease), Cardiomyopathy    · NM MYOCARDIAL PERFUSION SPECT (RX STRESS AND/OR REST); Status:NeedInformation - Financial Authorization; Requested for:28Nov2017;   Perform:[facility]; [phone]; Ordered; For:CAD (coronary artery disease), Cardiomyopathy; Ordered By:Syeda Perez;  CAD (coronary artery disease), Cardiomyopathy, Hypertension    · Changed: From  Metoprolol Tartrate 25 MG Oral Tablet TAKE 1 TABLET TWICE DAILY ToMetoprolol Tartrate 25 MG Oral Tablet TAKE 0 5 TABLET Twice daily  Rx By: Mariusz Adair; Dispense: 30 Days ; #E#:30 Tablet; Refill: 5;For: CAD (coronary artery disease), Cardiomyopathy, Hypertension; BRIEN = N; Sent To: [facility]E#164  CAD (coronary artery disease), Stroke    · EKG/ECG- POC; Status:Complete;   Done: 43BYM8436  Perform: In Office; Due:70Zse9246; Last Updated By:Chikis Syed; 10/17/2017 9:54:43 AM;Ordered; For:CAD (coronary artery disease), Stroke; Ordered By:Syeda Perez;  Cardiomyopathy    · Follow-up visit in 3 months Evaluation and Treatment  Follow-up  Status: Hold For -Scheduling  Requested for: 00PBN4248  Ordered; For: Cardiomyopathy;  Ordered By: Mariusz Adair  Performed:   Due: 20ERG8208  Dyslipidemia, Stroke    · Changed: From  Atorvastatin Calcium 80 MG Oral Tablet TAKE 1 TABLET DAILY ToAtorvastatin Calcium 80 MG Oral Tablet TAKE ONE TABLET BY MOUTH AT BEDTIME  Rx By: Mariusz Adair; Dispense: 30 Days ; #E#:30 Tablet;  Refill: 5;For: Dyslipidemia, Stroke; BRIEN = N; Sent To: [facility]164  Stroke    · Changed: From  Aspirin 81 MG Oral Tablet Chewable  To Aspirin 81 MG Oral TabletChewable Take 1 tablet daily  Rx By: Mariusz Adair; Dispense: 30 Days ; #E#:30 Tablet Chewable; Refill: 5;For: Stroke; BRIEN = N; Sent To: [facility]164    Discussion/Summary    1  Cardiomyopathy, presumed ischemic given history of CAD  He is not sure if his LV function was severely reduced at time of CABG in 2002, but just reports he had a bad heart  At this point, will order [place] [facility] nuclear stress test to evaluate for any significant reversible ischemia that could possibly help improve LV function, but will wait 4-6 weeks after acute CVA prior to having this done  For now continue beta blocker, statin, no ACE I or Entresto as BP is borderline  CVA  He was started on Coumadin as per Neurology  INR monitoring by Dr Mi Garcia  No cardiac etiology of stroke identified  Reports they had bloodwork done at [facility] yesterday  HTN  BP controlled to low on Metoprolol 12 5 bid  HL  On high dose statin  CAD s/p CABG  On aspirin, statin, beta blocker  Right groin hematoma  Patient reports he has a bump under the skin  I physically examined his groin, appears to be a resolving hematoma, no bruit or thrill, nontender  Patient unsure if getting larger or better, just says he never had a bump there before  Explained to wife that hematoma should resolve within next several weeks, if remains to let me know and I can order vascular ultrasound to ensure no pseudoaneurysm or fistula  1    The  patient1  ,  patient's family1  was counseled regarding1  diagnostic results1 ,-- instructions for management1 ,-- prognosis1 ,-- impressions1   Total time of encounter was 45 minutes1 -- and-- 25 minutes was spent counseling1         1 Amended By: Kwadwo Lai; Oct 17 2017 10:49 AM EST    Chief Complaint  Pt here for a new pt evaluation and hospital f/u  Pt has no cardiac complaints today  History of Present Illness  Cardiology HPI Free Text Note Form [facility]: 50year old man with a history of CAD with prior stents and subsequent reported [facility], HTN, HL, tobacco use, who is here for follow up of CAD and CVA     reports he was following with a cardiologist Dr Kenyon Skelton, but reports he was still having chest discomfort, shortness of breath, and dizziness, and nothing was done about it, so stopped taking his medications and following up since about 2004  was admitted to [place] [address] 10/17 with aphasia, found to have L MCA M3 division thrombus s/p tPA, unable to perform embolectomy due to distal position of thrombus  10/17 showed moderately dilated LV, severely reduced function with EF 20%, mild LVH, no evidence of apical thrombus, RV mildly dilated with mildly reduced function  LA moderately dilated  Mild MR  KRISTINA was done showing normal LV size and function, EF severely reduced at 20-25%, severe diffuse hypokinesis, LVH  Normal RV size and function  No thrombus seen in LA or ELISA  No PFO  Mild MR  Aortic sclerosis without stenosis  was started on warfarin as per Neurology  He reports he has stopped smoking  He reports he was smoking 1/2-3/4 ppd for about 40 years  His speech is OK, but he still has issues with memory  He is not back to work yet, 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  to stroke, he was having chest pain, not related to exertion, would last for several minutes, resolved on its own  He reports since stroke he has not had any further chest pain  No shortness of breath  No LE edema  No dizziness or lightheadedness  Review of Systems     Cardiac: chest pain, but-- no rhythm problems,-- no fainting/blackouts,-- no heart murmur present,-- no signs of swelling-- and-- no palpitations present  Skin: No complaints of nonhealing sores or skin rash  Genitourinary: No complaints of recurrent urinary tract infections, frequent urination at night, difficult urination, blood in urine, kidney stones, loss of bladder control, no kidney or prostate problems, no erectile dysfunction  Psychological: anxiety, but-- no depression,-- no panic attacks,-- no difficulty concentrating-- and-- no palpitations present    General: trouble sleeping-- and-- lack of energy/fatigue, but-- no appetite changes,-- no changes in weight,-- no fever,-- no night sweats-- and-- no frequent infections  Respiratory: shortness of breath, but-- no cough/sputum,-- no wheezing,-- no phlegm-- and-- no hemoptysis  HEENT: snoring, but-- no serious eye problems,-- no hearing problems,-- no nose problems-- and-- no throat problems  Gastrointestinal: No complaints of liver problems, nausea, vomiting, heartburn, constipation, bloody stools, diarrhea, problems swallowing, adbominal pain, or rectal bleeding  Hematologic: No complaints of bleeding disorders, anemia, blood clots, or excessive brusing  Neurological: No complaints of numbness, tingling, dizziness, weakness, seizures, headaches, syncope or fainting, AM fatigue, daytime sleepiness, no witnessed apnea episodes  Musculoskeletal: No complaints of arthritis, back pain, or painfull swelling  ROS reviewed  Active Problems  1  CAD (coronary artery disease) (414 00) (I25 10)  2  Hypertension (401 9) (I10)  3  Stroke (434 91) (I63 9)    Past Medical History    The active problems and past medical history were reviewed and updated today  Surgical History   · History of Appendectomy   · History of CABG    The surgical history was reviewed and updated today  Family History  Mother    · Family history of essential hypertension (V17 49) (Z82 49)   · Family history of malignant neoplasm (V16 9) (Z80 9)  Father    · Family history of essential hypertension (V17 49) (Z82 49)  Family History Reviewed: The family history was reviewed and updated today  Social History     · Former smoker (L22 31) (J15 068)   ·   The social history was reviewed and updated today  Current Meds  1  Aspirin 81 MG Oral Tablet Chewable; Therapy: (Recorded:16Oct2017) to Recorded  2  Atorvastatin Calcium 80 MG Oral Tablet; TAKE 1 TABLET DAILY; Therapy: (Recorded:16Oct2017) to Recorded  3   Metoprolol Tartrate 25 MG Oral Tablet; TAKE 1 TABLET TWICE DAILY; Therapy: (Recorded:68Rsz7436) to Recorded  4  Warfarin Sodium TABS; Therapy: (Recorded:79Irz2976) to Recorded    The medication list was reviewed and updated today  Allergies  1  Penicillins    Vitals  Signs     Heart Rate: 75  Systolic: 401, RUE, Sitting  Diastolic: 70, RUE, Sitting  Height: 6 ft   Weight: 205 lb 9 oz  BMI Calculated: 27 88  BSA Calculated: 2 16    Physical Exam   Constitutional  General appearance: No acute distress, well appearing and well nourished  Eyes  Conjunctiva and Sclera examination: Conjunctiva pink, sclera anicteric  Ears, Nose, Mouth, and Throat - Oropharynx: Clear, nares are clear, mucous membranes are moist   Neck  Neck and thyroid: Normal, supple, trachea midline, no thyromegaly  Pulmonary  Respiratory effort: No increased work of breathing or signs of respiratory distress  Auscultation of lungs: Clear to auscultation, no rales, no rhonchi, no wheezing, good air movement  Cardiovascular  Palpation of heart: Normal PMI, no thrills  Auscultation of heart: Normal rate and rhythm, normal S1 and S2, no murmurs  Carotid pulses: Normal, 2+ bilaterally  Examination of extremities for edema and/or varicosities: Normal -- Right groin hematoma at site of IR puncture site on right, no bruit or thrill, nontender, possibly hematoma under skin1   Chest - Chest: Normal   Abdomen  Abdomen: Non-tender and no distention  Musculoskeletal Gait and station: Normal gait  Skin - Skin and subcutaneous tissue: Normal without rashes or lesions  Skin is warm and well perfused, normal turgor  Neurologic - Speech: Normal    Psychiatric - Orientation to person, place, and time: Normal -- Mood and affect: Normal        1 Amended By: Suzanne Gerber; Oct 17 2017 10:47 AM EST    Results/Data   Rhythm and rate:  normal sinus rhythm  Axis: left axis deviation    QRS: incomplete left bundle branch block-- and-- possible inferior infarct      Future Appointments    Date/Time Provider Specialty Site   10/19/2017 10:00 AM JENNIFER John  Family [facility] [address]     End of Encounter Meds    1  Metoprolol Tartrate 25 MG Oral Tablet; TAKE 0 5 TABLET Twice daily; Last Rx:17Oct2017 Ordered    2  Atorvastatin Calcium 80 MG Oral Tablet; TAKE ONE TABLET BY MOUTH AT BEDTIME; Last Rx:17Oct2017 Ordered    3  Aspirin 81 MG Oral Tablet Chewable; Take 1 tablet daily; Last Rx:17Oct2017 Ordered  4  Warfarin Sodium TABS; Therapy: (Recorded:16Oct2017) to Recorded    Signatures   Electronically signed by :  Paola Mayes MD; Oct 17 2017 10:49AM EST                       (Author)

## 2017-11-02 ENCOUNTER — APPOINTMENT (OUTPATIENT)
Dept: LAB | Facility: MEDICAL CENTER | Age: 52
End: 2017-11-02
Payer: COMMERCIAL

## 2017-11-02 ENCOUNTER — ALLSCRIPTS OFFICE VISIT (OUTPATIENT)
Dept: OTHER | Facility: OTHER | Age: 52
End: 2017-11-02

## 2017-11-02 DIAGNOSIS — I63.9 CEREBRAL INFARCTION (HCC): ICD-10-CM

## 2017-11-02 LAB
INR PPP: 3.16 (ref 0.86–1.16)
PROTHROMBIN TIME: 32.9 SECONDS (ref 12.1–14.4)

## 2017-11-02 PROCEDURE — 85610 PROTHROMBIN TIME: CPT

## 2017-11-02 PROCEDURE — 36415 COLL VENOUS BLD VENIPUNCTURE: CPT

## 2017-11-03 ENCOUNTER — GENERIC CONVERSION - ENCOUNTER (OUTPATIENT)
Dept: OTHER | Facility: OTHER | Age: 52
End: 2017-11-03

## 2017-11-03 ENCOUNTER — APPOINTMENT (OUTPATIENT)
Dept: SPEECH THERAPY | Facility: CLINIC | Age: 52
End: 2017-11-03
Payer: COMMERCIAL

## 2017-11-03 ENCOUNTER — APPOINTMENT (OUTPATIENT)
Dept: OCCUPATIONAL THERAPY | Facility: CLINIC | Age: 52
End: 2017-11-03
Payer: COMMERCIAL

## 2017-11-03 PROCEDURE — 97535 SELF CARE MNGMENT TRAINING: CPT

## 2017-11-03 PROCEDURE — 92507 TX SP LANG VOICE COMM INDIV: CPT

## 2017-11-03 NOTE — PROGRESS NOTES
Assessment  1  Stroke (434 91) (I63 9)    Plan  Stroke    · Follow-up visit in 3 months Evaluation and Treatment  Follow-up  Status: Complete   Done: 65XKG2633  Ordered; For: Stroke;  Ordered By: Janie Rivera  Performed:   Due: 58HIA0723; Last   Updated By: Deo Watt; 11/2/2017 12:12:49 PM   · *1 - [facility] OCCUPATIONAL THERAPY Co-Management  *fit to drive test to see if he can  drive  Status: Active  Requested for: 22PAR7933  Ordered; For: Stroke;  Ordered By: Janie Rivera  Performed:   Due: 72PGR7462; Last   Updated By: Deo Watt; 11/2/2017 12:12:10 PM  () Care Summary provided  : Yes    Discussion/Summary   61-year-old  male with history of cardiomyopathy with EF of 20%, who is here today as a new patient for hospital follow-up for recent left MCA stroke  Etiology of his left MCA stroke is likely cardioembolic  When he was admitted at the hospital, he refused MRI brain but he was within the window for intervention but since clot too distal, he was not a candidate for intervention  I will touch base with cardiology to see if patient can be on a NOAC instead of coumadin  Continue coumadin and aspirin for stroke prevention   Continue Atorvastatin 80mg PO QHS for hyperlipidemia  Continue BP control, goal < 130/80  Continue to monitor DM and appropriate Euglycemic control  Defer to PCP regarding DM and BP management  Continue with PT/OT and Speech therapy  will prescribe him OT eval (fit to drive)   defer to PCP regarding urinary frequency  Advised the patient/guardian if they have any stroke like symptoms such as facial droop, weakness on either side, speech trouble, numbness, balance issues, any vision changes, or any new headache, to call 9-1-1 immediately or to proceed to the nearest ER immediately  Patient/guardian was counseled regarding instructions for management  Possible side effects of new medications were reviewed with the patient/guardian today   Treatment plan was reviewed with the patient/guardian  The patient/guardian understands and agrees with the plan  Chief Complaint  Chief Complaint Free Text Note Form: Patient present for hospital follow-up appointment regarding stroke on 10/9/17      History of Present Illness  HPI:     This is a 51 y/o [place] male who is here today in neurology clinic as a new patient for stroke  Patient had speech difficulties and was found to have a left MCA stroke  He received tPA was transferred to the main hospital   He refused MRI while he was admitted  CTA showed left MCA clot, patient was not a candidate for intervention because the clot was too distal   Echo showed an EF of 20 percent during his hospital stay  He was started on Coumadin  He was discharged on Coumadin aspirin and atorvastatin  Saw Cardiology outpatient few weeks ago  Current we being worked up for defibrillator placement  Patient really concerned about Coumadin and the interactions and also seems a little worried about getting his blood drawn so frequently sleep  Last INR from October 27th was 2 7  He has not had any new stroke-like symptoms  He is currently getting occupational therapy and speech therapy and states that his speech has improved  He has not been driving but would like to stay drive or be evaluated for driving  Review of Systems  ROS Reviewed:   ROS reviewed  Active Problems  1  CAD (coronary artery disease) (414 00) (I25 10)  2  Cardiomyopathy (425 4) (I42 9)  3  Dyslipidemia (272 4) (E78 5)  4  Encounter for screening colonoscopy (V76 51) (Z12 11)  5  Hypertension (401 9) (I10)  6  Noncompliance (V15 81) (Z91 19)  7  Stroke (434 91) (I63 9)    Past Medical History  1  History of Patient denies medical problems (V49 89) (Z78 9)  Active Problems And Past Medical History Reviewed: The active problems and past medical history were reviewed and updated today  Surgical History  1  History of Appendectomy  2   History of CABG  Surgical History Reviewed: The surgical history was reviewed and updated today  Family History  Mother   1  Family history of essential hypertension (V17 49) (Z82 49)  2  Family history of malignant neoplasm (V16 9) (Z80 9)  Father   3  Family history of essential hypertension (V17 49) (Z82 49)  Family History Reviewed: The family history was reviewed and updated today  Social History   · Former consumption of alcohol (V11 3) (Z87 898)   · Former smoker (K11 03) (P10 403)   · History of crack cocaine use   ·   Social History Reviewed: The social history was reviewed and updated today  The social history was reviewed and is unchanged  Current Meds  1  Aspirin 81 MG Oral Tablet Chewable; Take 1 tablet daily  Requested for: 15MBL6812; Last   Rx:[id number] Ordered  2  Atorvastatin Calcium 80 MG Oral Tablet; TAKE ONE TABLET BY MOUTH AT BEDTIME    Requested for: 98OOM0689; Last Rx:63Jen7961 Ordered  3  Metoprolol Tartrate 25 MG Oral Tablet; TAKE 0 5 TABLET Twice daily  Requested for:   94VBT0853; Last Rx:70Zcm2996 Ordered  4  Warfarin Sodium 3 MG Oral Tablet; TAKE 1 TABLET DAILY AS DIRECTED; Therapy: 44ZYU0290 to [id number])  Requested for: 27Oct2017; Last   Rx:94Nif9012 Ordered  5  Warfarin Sodium 5 MG Oral Tablet; TAKE 1 TABLET DAILY AS DIRECTED  Requested for:   03ZFO4025; Last EC:95CQR9899 Ordered  Medication List Reviewed: The medication list was reviewed and updated today  Allergies  1  Penicillins    Vitals  Signs   Recorded: 96LKI3338 00:61TX   Systolic: 471  Diastolic: 76  Recorded: 78TKG2193 11:27AM   Heart Rate: 70  Respiration: 18  Systolic: 908  Diastolic: 46  Height: 6 ft   Weight: 205 lb 2 oz  BMI Calculated: 27 82  BSA Calculated: 2 15  O2 Saturation: 99    Physical Exam  Patient is not in any distress  Alert, awake oriented x3 and follows commands  Speech is fluent, reading, writing, naming, repitition and comprehension intact     Pupils equal round reactive to light and accommodation and extraocular movements intact  Visual fields full to confrontation  Fundus exam - normal, no papilledema noted  Facial sensation intact to soft touch in V1, V2 and V3 distributions  Face symmetric  Tongue midline, able to move side to side  Shoulder shrug strong  Motor strength 5/5 in all 4 extremities  No drift  Normal rapid alternating movements  Sensation intact to soft touch in all 4 extremities  Reflexes 2+ in all 4 extremities, downgoing toes bilaterally  Normal gait, tandem walking, normal arm to swing  Results/Data  Diagnostic Studies Reviewed:   CT Scan Review CT Head 10/10/17infarct in the left lateral temporal lobe  No evidence for space-occupying hematoma or mass effect  Head 10/09/17of contrast opacification, a short segment, within the proximal portion of the superior division of the left MCA (series 2 image 166)  Distal reconstitution is noted  of the intracranial vasculature appears intact with signs atherosclerotic disease hemodynamically significant stenosis within the cervical carotids by NASCET criteria  Patent bilateral cervical vertebral arteries  Diagnostic Review Lipid Panel 10/10/[id number]        Future Appointments    Date/Time Provider Specialty Site   11/21/2017 10:15 AM Joe Ervin DO Family Medicine [facility]   02/05/2018 02:00 PM Malathi Edmondson MD Neurology [address]     Signatures   Electronically signed by : Kar Crump MD; Nov 2 2017 12:28PM EST                       (Author)    Electronically signed by : Kar Crump MD; Nov 2 2017 12:29PM EST                       (Author)    Electronically signed by : Kar Crump MD; Nov 2 2017  1:21PM EST                       (Author)

## 2017-11-06 ENCOUNTER — GENERIC CONVERSION - ENCOUNTER (OUTPATIENT)
Dept: OTHER | Facility: OTHER | Age: 52
End: 2017-11-06

## 2017-11-06 ENCOUNTER — APPOINTMENT (OUTPATIENT)
Dept: LAB | Facility: MEDICAL CENTER | Age: 52
End: 2017-11-06
Payer: COMMERCIAL

## 2017-11-06 DIAGNOSIS — I63.9 CEREBRAL INFARCTION (HCC): ICD-10-CM

## 2017-11-06 LAB
INR PPP: 3.02 (ref 0.86–1.16)
PROTHROMBIN TIME: 31.7 SECONDS (ref 12.1–14.4)

## 2017-11-06 PROCEDURE — 85610 PROTHROMBIN TIME: CPT

## 2017-11-06 PROCEDURE — 36415 COLL VENOUS BLD VENIPUNCTURE: CPT

## 2017-11-07 ENCOUNTER — APPOINTMENT (OUTPATIENT)
Dept: OCCUPATIONAL THERAPY | Facility: CLINIC | Age: 52
End: 2017-11-07
Payer: COMMERCIAL

## 2017-11-07 PROCEDURE — 97535 SELF CARE MNGMENT TRAINING: CPT

## 2017-11-10 ENCOUNTER — APPOINTMENT (OUTPATIENT)
Dept: SPEECH THERAPY | Facility: CLINIC | Age: 52
End: 2017-11-10
Payer: COMMERCIAL

## 2017-11-10 ENCOUNTER — APPOINTMENT (OUTPATIENT)
Dept: OCCUPATIONAL THERAPY | Facility: CLINIC | Age: 52
End: 2017-11-10
Payer: COMMERCIAL

## 2017-11-10 PROCEDURE — 97535 SELF CARE MNGMENT TRAINING: CPT

## 2017-11-10 PROCEDURE — 92507 TX SP LANG VOICE COMM INDIV: CPT

## 2017-11-13 ENCOUNTER — APPOINTMENT (OUTPATIENT)
Dept: OCCUPATIONAL THERAPY | Facility: CLINIC | Age: 52
End: 2017-11-13
Payer: COMMERCIAL

## 2017-11-13 ENCOUNTER — APPOINTMENT (OUTPATIENT)
Dept: LAB | Facility: MEDICAL CENTER | Age: 52
End: 2017-11-13
Payer: COMMERCIAL

## 2017-11-13 ENCOUNTER — APPOINTMENT (OUTPATIENT)
Dept: SPEECH THERAPY | Facility: CLINIC | Age: 52
End: 2017-11-13
Payer: COMMERCIAL

## 2017-11-13 ENCOUNTER — GENERIC CONVERSION - ENCOUNTER (OUTPATIENT)
Dept: OTHER | Facility: OTHER | Age: 52
End: 2017-11-13

## 2017-11-13 DIAGNOSIS — I63.9 CEREBRAL INFARCTION (HCC): ICD-10-CM

## 2017-11-13 LAB
INR PPP: 2.87 (ref 0.86–1.16)
PROTHROMBIN TIME: 30.5 SECONDS (ref 12.1–14.4)

## 2017-11-13 PROCEDURE — 92507 TX SP LANG VOICE COMM INDIV: CPT

## 2017-11-13 PROCEDURE — 97535 SELF CARE MNGMENT TRAINING: CPT

## 2017-11-13 PROCEDURE — 85610 PROTHROMBIN TIME: CPT

## 2017-11-13 PROCEDURE — 36415 COLL VENOUS BLD VENIPUNCTURE: CPT

## 2017-11-16 ENCOUNTER — APPOINTMENT (OUTPATIENT)
Dept: OCCUPATIONAL THERAPY | Facility: CLINIC | Age: 52
End: 2017-11-16
Payer: COMMERCIAL

## 2017-11-16 PROCEDURE — 97535 SELF CARE MNGMENT TRAINING: CPT

## 2017-11-20 ENCOUNTER — TRANSCRIBE ORDERS (OUTPATIENT)
Dept: LAB | Facility: MEDICAL CENTER | Age: 52
End: 2017-11-20

## 2017-11-20 ENCOUNTER — APPOINTMENT (OUTPATIENT)
Dept: LAB | Facility: MEDICAL CENTER | Age: 52
End: 2017-11-20
Payer: COMMERCIAL

## 2017-11-20 ENCOUNTER — GENERIC CONVERSION - ENCOUNTER (OUTPATIENT)
Dept: OTHER | Facility: OTHER | Age: 52
End: 2017-11-20

## 2017-11-20 ENCOUNTER — APPOINTMENT (OUTPATIENT)
Dept: OCCUPATIONAL THERAPY | Facility: CLINIC | Age: 52
End: 2017-11-20
Payer: COMMERCIAL

## 2017-11-20 ENCOUNTER — APPOINTMENT (OUTPATIENT)
Dept: SPEECH THERAPY | Facility: CLINIC | Age: 52
End: 2017-11-20
Payer: COMMERCIAL

## 2017-11-20 DIAGNOSIS — I63.9 CEREBELLAR INFARCTION (HCC): Primary | ICD-10-CM

## 2017-11-20 LAB
INR PPP: 3.34 (ref 0.86–1.16)
PROTHROMBIN TIME: 34.4 SECONDS (ref 12.1–14.4)

## 2017-11-20 PROCEDURE — 97535 SELF CARE MNGMENT TRAINING: CPT

## 2017-11-20 PROCEDURE — 85610 PROTHROMBIN TIME: CPT | Performed by: FAMILY MEDICINE

## 2017-11-20 PROCEDURE — 92507 TX SP LANG VOICE COMM INDIV: CPT

## 2017-11-20 PROCEDURE — 36415 COLL VENOUS BLD VENIPUNCTURE: CPT | Performed by: FAMILY MEDICINE

## 2017-11-21 ENCOUNTER — APPOINTMENT (OUTPATIENT)
Dept: OCCUPATIONAL THERAPY | Facility: CLINIC | Age: 52
End: 2017-11-21
Payer: COMMERCIAL

## 2017-11-21 ENCOUNTER — ALLSCRIPTS OFFICE VISIT (OUTPATIENT)
Dept: OTHER | Facility: OTHER | Age: 52
End: 2017-11-21

## 2017-11-22 ENCOUNTER — APPOINTMENT (OUTPATIENT)
Dept: SPEECH THERAPY | Facility: CLINIC | Age: 52
End: 2017-11-22
Payer: COMMERCIAL

## 2017-11-22 ENCOUNTER — APPOINTMENT (OUTPATIENT)
Dept: OCCUPATIONAL THERAPY | Facility: CLINIC | Age: 52
End: 2017-11-22
Payer: COMMERCIAL

## 2017-11-22 PROCEDURE — 92507 TX SP LANG VOICE COMM INDIV: CPT

## 2017-11-22 PROCEDURE — 97535 SELF CARE MNGMENT TRAINING: CPT

## 2017-11-22 NOTE — PROGRESS NOTES
Assessment    1  Enlarged prostate (600 00) (N40 0)   2  Medication management (V58 69) (Z79 899)    Plan  Dyslipidemia, Hypertension, Stroke    · Follow-up visit in 3 months Evaluation and Treatment  Follow-up  Status: Complete Done: 72QHP1123  Enlarged prostate    · *1 - [facility]  *  Status: Active  Requested for:21Nov2017  Care Summary provided  : Yes  Medication management    · 1 - Carrie Dick (Nurse Practitioner) Co-Management  Coumadin Mgmt  Status: Active  Requested for: 21Nov2017  Care Summary provided  : Yes  Stroke    · (1) PT WITH INR; Status:Active; Requested for:27Nov2017;     Discussion/Summary    Unclear if his urinary symptoms are related to his stroke or secondary to his enlarged prostate  Will have him see Urology for evaluation  I offer him medication to help with urination in the evening but he declined at this time  Would like to see with the specialist has to say  reviewed today  It is too high again  Patient is taking his Coumadin as instructed which is 5 mg on Mondays, Wednesdays and Fridays and 8 mg on Tuesdays, Thursdays, Saturdays and Sundays  Will have him skip the 3 mg dose tonight  Repeat INR next Monday  Will have him see the [facility] for management  discussed with patient that I will message his neurologist and his cardiologist to get information on alternative medication for Coumadin and to see if he still needs to take his aspirin  in 3 months or sooner if needed  Possible side effects of new medications were reviewed with the patient/guardian today  The treatment plan was reviewed with the patient/guardian  The patient/guardian understands and agrees with the treatment plan      Chief Complaint  The patient is here today for a follow up  History of Present Illness  Patient presents for follow-up  Has been feeling well lately   Feels much better than when he was discharge from the hospital  Would like to know if he is a candidate for a different medication other than Coumadin  Would also like to know if he still needs to take aspirin  Only complaint is having to wake up at night, a few nights during the week, to urinate  Has only been occurring since his hospital discharge  Review of Systems   Constitutional: no fever  Cardiovascular: no chest pain  Respiratory: no shortness of breath  Active Problems  1  CAD (coronary artery disease) (414 00) (I25 10)   2  Cardiomyopathy (425 4) (I42 9)   3  Dyslipidemia (272 4) (E78 5)   4  Encounter for screening colonoscopy (V76 51) (Z12 11)   5  Hypertension (401 9) (I10)   6  Noncompliance (V15 81) (Z91 19)   7  Stroke (434 91) (I63 9)    Past Medical History  1  History of Patient denies medical problems (V49 89) (Z78 9)    The active problems and past medical history were reviewed and updated today  Surgical History  1  History of Appendectomy   2  History of CABG    Family History  Mother    1  Family history of essential hypertension (V17 49) (Z82 49)   2  Family history of malignant neoplasm (V16 9) (Z80 9)  Father    3  Family history of essential hypertension (V17 49) (Z82 49)    Social History     · Former consumption of alcohol (V11 3) (Z87 898)   · Former smoker (X78 34) (F73 231)   · History of crack cocaine use   ·     Current Meds   1  Aspirin 81 MG Oral Tablet Chewable; Take 1 tablet daily  Requested for: 23ZUF4119; Last Rx:17Oct2017 Ordered   2  Atorvastatin Calcium 80 MG Oral Tablet; TAKE ONE TABLET BY MOUTH AT BEDTIME  Requested for: 36UFO5211; Last Rx:17Oct2017 Ordered   3  Metoprolol Tartrate 25 MG Oral Tablet; TAKE 0 5 TABLET Twice daily  Requested for: 07LSX9830; Last Rx:17Oct2017 Ordered   4  Warfarin Sodium 3 MG Oral Tablet; TAKE 1 TABLET DAILY AS DIRECTED; Therapy: 75QBQ0866 to [id number])  Requested for: 27Oct2017; Last Rx:27Oct2017 Ordered   5   Warfarin Sodium 5 MG Oral Tablet; TAKE 1 TABLET DAILY AS DIRECTED  Requested for: 04XQO4902; Last Rx: 54Bbz6702 Ordered    The medication list was reviewed and updated today  Allergies  1  Penicillins    Vitals  Vital Signs    Recorded: 21Nov2017 10:13AM   Heart Rate 58   Respiration 18   Systolic 095   Diastolic 62   Weight 366 lb 8 oz   BMI Calculated 28 01   BSA Calculated 2 16       Physical Exam   Constitutional  General appearance: No acute distress, well appearing and well nourished  Pulmonary  Respiratory effort: No increased work of breathing or signs of respiratory distress  Auscultation of lungs: Clear to auscultation  Cardiovascular  Auscultation of heart: Normal rate and rhythm, normal S1 and S2, no murmurs  Examination of extremities for edema and/or varicosities: Normal    Genitourinary  Digital rectal exam of prostate: Abnormal   The prostate had no palpable nodules-- and-- was nontender  The right lobe of the prostate was enlarged  The left lobe of the prostate was not enlarged        Future Appointments    Date/Time Provider Specialty Site   02/21/2018 03:00 PM Tod Harper DO Family Medicine [facility]   02/05/2018 02:00 PM Chad Cavazos MD Neurology [place] [address]       Signatures   Electronically signed by : Renay Santizo DO; Nov 21 2017 12:49PM EST                       (Author)

## 2017-11-28 ENCOUNTER — APPOINTMENT (OUTPATIENT)
Dept: LAB | Facility: MEDICAL CENTER | Age: 52
End: 2017-11-28
Payer: COMMERCIAL

## 2017-11-28 ENCOUNTER — GENERIC CONVERSION - ENCOUNTER (OUTPATIENT)
Dept: OTHER | Facility: OTHER | Age: 52
End: 2017-11-28

## 2017-11-28 DIAGNOSIS — I63.9 CEREBRAL INFARCTION (HCC): ICD-10-CM

## 2017-11-28 DIAGNOSIS — I25.10 ATHEROSCLEROTIC HEART DISEASE OF NATIVE CORONARY ARTERY WITHOUT ANGINA PECTORIS: ICD-10-CM

## 2017-11-28 DIAGNOSIS — I42.9 CARDIOMYOPATHY (HCC): ICD-10-CM

## 2017-11-28 LAB
INR PPP: 2.54 (ref 0.86–1.16)
PROTHROMBIN TIME: 27.7 SECONDS (ref 12.1–14.4)

## 2017-11-28 PROCEDURE — 36415 COLL VENOUS BLD VENIPUNCTURE: CPT

## 2017-11-28 PROCEDURE — 85610 PROTHROMBIN TIME: CPT

## 2017-11-29 ENCOUNTER — HOSPITAL ENCOUNTER (OUTPATIENT)
Dept: NON INVASIVE DIAGNOSTICS | Facility: CLINIC | Age: 52
Discharge: HOME | End: 2017-11-29
Payer: COMMERCIAL

## 2017-11-29 ENCOUNTER — GENERIC CONVERSION - ENCOUNTER (OUTPATIENT)
Dept: OTHER | Facility: OTHER | Age: 52
End: 2017-11-29

## 2017-11-29 DIAGNOSIS — I25.10 ATHEROSCLEROSIS OF NATIVE CORONARY ARTERY OF NATIVE HEART, ANGINA PRESENCE UNSPECIFIED: ICD-10-CM

## 2017-11-29 PROCEDURE — A9502 TC99M TETROFOSMIN: HCPCS

## 2017-11-29 PROCEDURE — 93017 CV STRESS TEST TRACING ONLY: CPT

## 2017-11-29 PROCEDURE — 78452 HT MUSCLE IMAGE SPECT MULT: CPT

## 2017-11-29 RX ADMIN — REGADENOSON 0.4 MG: 0.08 INJECTION, SOLUTION INTRAVENOUS at 13:19

## 2017-11-30 ENCOUNTER — APPOINTMENT (OUTPATIENT)
Dept: SPEECH THERAPY | Facility: CLINIC | Age: 52
End: 2017-11-30
Payer: COMMERCIAL

## 2017-11-30 ENCOUNTER — APPOINTMENT (OUTPATIENT)
Dept: OCCUPATIONAL THERAPY | Facility: CLINIC | Age: 52
End: 2017-11-30
Payer: COMMERCIAL

## 2017-11-30 LAB
CHEST PAIN STATEMENT: NORMAL
MAX DIASTOLIC BP: 80 MMHG
MAX HEART RATE: 126 BPM
MAX PREDICTED HEART RATE: 170 BPM
MAX. SYSTOLIC BP: 152 MMHG
PROTOCOL NAME: NORMAL
REASON FOR TERMINATION: NORMAL
TARGET HR FORMULA: NORMAL
TEST INDICATION: NORMAL
TIME IN EXERCISE PHASE: NORMAL

## 2017-11-30 PROCEDURE — 92507 TX SP LANG VOICE COMM INDIV: CPT

## 2017-11-30 PROCEDURE — 97535 SELF CARE MNGMENT TRAINING: CPT

## 2017-12-01 ENCOUNTER — GENERIC CONVERSION - ENCOUNTER (OUTPATIENT)
Dept: OTHER | Facility: OTHER | Age: 52
End: 2017-12-01

## 2017-12-01 ENCOUNTER — APPOINTMENT (OUTPATIENT)
Dept: OCCUPATIONAL THERAPY | Facility: CLINIC | Age: 52
End: 2017-12-01
Payer: COMMERCIAL

## 2017-12-01 PROCEDURE — G8991 OTHER PT/OT GOAL STATUS: HCPCS

## 2017-12-01 PROCEDURE — G8990 OTHER PT/OT CURRENT STATUS: HCPCS

## 2017-12-01 PROCEDURE — 97535 SELF CARE MNGMENT TRAINING: CPT

## 2017-12-04 ENCOUNTER — APPOINTMENT (OUTPATIENT)
Dept: SPEECH THERAPY | Facility: CLINIC | Age: 52
End: 2017-12-04
Payer: COMMERCIAL

## 2017-12-04 ENCOUNTER — APPOINTMENT (OUTPATIENT)
Dept: OCCUPATIONAL THERAPY | Facility: CLINIC | Age: 52
End: 2017-12-04
Payer: COMMERCIAL

## 2017-12-05 ENCOUNTER — TRANSCRIBE ORDERS (OUTPATIENT)
Dept: ADMINISTRATIVE | Facility: HOSPITAL | Age: 52
End: 2017-12-05

## 2017-12-05 ENCOUNTER — LAB CONVERSION - ENCOUNTER (OUTPATIENT)
Dept: OTHER | Facility: OTHER | Age: 52
End: 2017-12-05

## 2017-12-05 ENCOUNTER — GENERIC CONVERSION - ENCOUNTER (OUTPATIENT)
Dept: OTHER | Facility: OTHER | Age: 52
End: 2017-12-05

## 2017-12-05 ENCOUNTER — APPOINTMENT (OUTPATIENT)
Dept: LAB | Facility: MEDICAL CENTER | Age: 52
End: 2017-12-05
Payer: COMMERCIAL

## 2017-12-05 ENCOUNTER — GENERIC CONVERSION - ENCOUNTER (OUTPATIENT)
Dept: FAMILY MEDICINE CLINIC | Facility: MEDICAL CENTER | Age: 52
End: 2017-12-05

## 2017-12-05 DIAGNOSIS — I63.9 CEREBROVASCULAR ACCIDENT (CVA), UNSPECIFIED MECHANISM (HCC): Primary | ICD-10-CM

## 2017-12-05 LAB
INR PPP: 2.14 (ref 0.86–1.16)
PROTHROMBIN TIME: 24.1 SECONDS (ref 12.1–14.4)

## 2017-12-05 PROCEDURE — 85610 PROTHROMBIN TIME: CPT | Performed by: FAMILY MEDICINE

## 2017-12-05 PROCEDURE — 36415 COLL VENOUS BLD VENIPUNCTURE: CPT | Performed by: FAMILY MEDICINE

## 2017-12-06 ENCOUNTER — APPOINTMENT (OUTPATIENT)
Dept: SPEECH THERAPY | Facility: CLINIC | Age: 52
End: 2017-12-06
Payer: COMMERCIAL

## 2017-12-06 ENCOUNTER — APPOINTMENT (OUTPATIENT)
Dept: OCCUPATIONAL THERAPY | Facility: CLINIC | Age: 52
End: 2017-12-06
Payer: COMMERCIAL

## 2017-12-06 PROCEDURE — 92507 TX SP LANG VOICE COMM INDIV: CPT

## 2017-12-06 PROCEDURE — G9169 MEMORY GOAL STATUS: HCPCS

## 2017-12-06 PROCEDURE — G9168 MEMORY CURRENT STATUS: HCPCS

## 2017-12-06 PROCEDURE — 97535 SELF CARE MNGMENT TRAINING: CPT

## 2017-12-06 PROCEDURE — 96125 COGNITIVE TEST BY HC PRO: CPT

## 2017-12-11 ENCOUNTER — APPOINTMENT (OUTPATIENT)
Dept: LAB | Facility: MEDICAL CENTER | Age: 52
End: 2017-12-11
Payer: COMMERCIAL

## 2017-12-11 ENCOUNTER — GENERIC CONVERSION - ENCOUNTER (OUTPATIENT)
Dept: OTHER | Facility: OTHER | Age: 52
End: 2017-12-11

## 2017-12-11 DIAGNOSIS — I63.9 CEREBRAL INFARCTION (HCC): ICD-10-CM

## 2017-12-11 LAB
INR PPP: 2.01 (ref 0.86–1.16)
PROTHROMBIN TIME: 23 SECONDS (ref 12.1–14.4)

## 2017-12-11 PROCEDURE — 85610 PROTHROMBIN TIME: CPT

## 2017-12-11 PROCEDURE — 36415 COLL VENOUS BLD VENIPUNCTURE: CPT

## 2017-12-13 ENCOUNTER — APPOINTMENT (OUTPATIENT)
Dept: OCCUPATIONAL THERAPY | Facility: CLINIC | Age: 52
End: 2017-12-13
Payer: COMMERCIAL

## 2017-12-13 ENCOUNTER — APPOINTMENT (OUTPATIENT)
Dept: SPEECH THERAPY | Facility: CLINIC | Age: 52
End: 2017-12-13
Payer: COMMERCIAL

## 2017-12-13 PROCEDURE — 97535 SELF CARE MNGMENT TRAINING: CPT

## 2017-12-13 PROCEDURE — 92507 TX SP LANG VOICE COMM INDIV: CPT

## 2017-12-15 ENCOUNTER — GENERIC CONVERSION - ENCOUNTER (OUTPATIENT)
Dept: FAMILY MEDICINE CLINIC | Facility: MEDICAL CENTER | Age: 52
End: 2017-12-15

## 2017-12-15 ENCOUNTER — APPOINTMENT (OUTPATIENT)
Dept: SPEECH THERAPY | Facility: CLINIC | Age: 52
End: 2017-12-15
Payer: COMMERCIAL

## 2017-12-15 ENCOUNTER — APPOINTMENT (OUTPATIENT)
Dept: OCCUPATIONAL THERAPY | Facility: CLINIC | Age: 52
End: 2017-12-15
Payer: COMMERCIAL

## 2017-12-15 PROCEDURE — 92507 TX SP LANG VOICE COMM INDIV: CPT

## 2017-12-15 PROCEDURE — 97535 SELF CARE MNGMENT TRAINING: CPT

## 2017-12-18 ENCOUNTER — APPOINTMENT (OUTPATIENT)
Dept: LAB | Facility: MEDICAL CENTER | Age: 52
End: 2017-12-18
Payer: COMMERCIAL

## 2017-12-18 DIAGNOSIS — I63.9 CEREBRAL INFARCTION (HCC): ICD-10-CM

## 2017-12-18 LAB
INR PPP: 2.34 (ref 0.86–1.16)
PROTHROMBIN TIME: 25.9 SECONDS (ref 12.1–14.4)

## 2017-12-18 PROCEDURE — 85610 PROTHROMBIN TIME: CPT

## 2017-12-18 PROCEDURE — 36415 COLL VENOUS BLD VENIPUNCTURE: CPT

## 2017-12-19 ENCOUNTER — GENERIC CONVERSION - ENCOUNTER (OUTPATIENT)
Dept: OTHER | Facility: OTHER | Age: 52
End: 2017-12-19

## 2017-12-20 ENCOUNTER — APPOINTMENT (OUTPATIENT)
Dept: OCCUPATIONAL THERAPY | Facility: CLINIC | Age: 52
End: 2017-12-20
Payer: COMMERCIAL

## 2017-12-20 ENCOUNTER — APPOINTMENT (OUTPATIENT)
Dept: SPEECH THERAPY | Facility: CLINIC | Age: 52
End: 2017-12-20
Payer: COMMERCIAL

## 2017-12-20 PROCEDURE — 97535 SELF CARE MNGMENT TRAINING: CPT

## 2017-12-20 PROCEDURE — 92507 TX SP LANG VOICE COMM INDIV: CPT

## 2017-12-22 ENCOUNTER — GENERIC CONVERSION - ENCOUNTER (OUTPATIENT)
Dept: FAMILY MEDICINE CLINIC | Facility: MEDICAL CENTER | Age: 52
End: 2017-12-22

## 2017-12-22 ENCOUNTER — APPOINTMENT (OUTPATIENT)
Dept: OCCUPATIONAL THERAPY | Facility: CLINIC | Age: 52
End: 2017-12-22
Payer: COMMERCIAL

## 2017-12-22 ENCOUNTER — APPOINTMENT (OUTPATIENT)
Dept: SPEECH THERAPY | Facility: CLINIC | Age: 52
End: 2017-12-22
Payer: COMMERCIAL

## 2017-12-22 PROCEDURE — G8991 OTHER PT/OT GOAL STATUS: HCPCS

## 2017-12-22 PROCEDURE — 92507 TX SP LANG VOICE COMM INDIV: CPT

## 2017-12-22 PROCEDURE — G8990 OTHER PT/OT CURRENT STATUS: HCPCS

## 2017-12-22 PROCEDURE — 97535 SELF CARE MNGMENT TRAINING: CPT

## 2017-12-26 ENCOUNTER — GENERIC CONVERSION - ENCOUNTER (OUTPATIENT)
Dept: OTHER | Facility: OTHER | Age: 52
End: 2017-12-26

## 2017-12-26 ENCOUNTER — APPOINTMENT (OUTPATIENT)
Dept: LAB | Facility: MEDICAL CENTER | Age: 52
End: 2017-12-26
Payer: COMMERCIAL

## 2017-12-26 DIAGNOSIS — I63.9 CEREBRAL INFARCTION (HCC): ICD-10-CM

## 2017-12-26 LAB
INR PPP: 3.47 (ref 0.86–1.16)
PROTHROMBIN TIME: 35.4 SECONDS (ref 12.1–14.4)

## 2017-12-26 PROCEDURE — 85610 PROTHROMBIN TIME: CPT

## 2017-12-26 PROCEDURE — 36415 COLL VENOUS BLD VENIPUNCTURE: CPT

## 2017-12-27 ENCOUNTER — GENERIC CONVERSION - ENCOUNTER (OUTPATIENT)
Dept: FAMILY MEDICINE CLINIC | Facility: MEDICAL CENTER | Age: 52
End: 2017-12-27

## 2017-12-27 ENCOUNTER — APPOINTMENT (OUTPATIENT)
Dept: SPEECH THERAPY | Facility: CLINIC | Age: 52
End: 2017-12-27
Payer: COMMERCIAL

## 2017-12-27 ENCOUNTER — APPOINTMENT (OUTPATIENT)
Dept: OCCUPATIONAL THERAPY | Facility: CLINIC | Age: 52
End: 2017-12-27
Payer: COMMERCIAL

## 2017-12-27 PROCEDURE — 92507 TX SP LANG VOICE COMM INDIV: CPT

## 2017-12-28 ENCOUNTER — APPOINTMENT (OUTPATIENT)
Dept: SPEECH THERAPY | Facility: CLINIC | Age: 52
End: 2017-12-28
Payer: COMMERCIAL

## 2017-12-28 ENCOUNTER — APPOINTMENT (OUTPATIENT)
Dept: OCCUPATIONAL THERAPY | Facility: CLINIC | Age: 52
End: 2017-12-28
Payer: COMMERCIAL

## 2017-12-28 ENCOUNTER — GENERIC CONVERSION - ENCOUNTER (OUTPATIENT)
Dept: FAMILY MEDICINE CLINIC | Facility: MEDICAL CENTER | Age: 52
End: 2017-12-28

## 2017-12-28 PROCEDURE — G9170 MEMORY D/C STATUS: HCPCS

## 2017-12-28 PROCEDURE — 96125 COGNITIVE TEST BY HC PRO: CPT

## 2017-12-28 PROCEDURE — G9169 MEMORY GOAL STATUS: HCPCS

## 2018-01-02 ENCOUNTER — GENERIC CONVERSION - ENCOUNTER (OUTPATIENT)
Dept: OTHER | Facility: OTHER | Age: 53
End: 2018-01-02

## 2018-01-02 ENCOUNTER — APPOINTMENT (OUTPATIENT)
Dept: LAB | Facility: MEDICAL CENTER | Age: 53
End: 2018-01-02
Payer: COMMERCIAL

## 2018-01-02 DIAGNOSIS — I63.9 CEREBRAL INFARCTION (HCC): ICD-10-CM

## 2018-01-02 DIAGNOSIS — Z79.01 LONG TERM CURRENT USE OF ANTICOAGULANT: ICD-10-CM

## 2018-01-02 LAB
INR PPP: 2.58 (ref 0.86–1.16)
PROTHROMBIN TIME: 28 SECONDS (ref 12.1–14.4)

## 2018-01-02 PROCEDURE — 85610 PROTHROMBIN TIME: CPT

## 2018-01-02 PROCEDURE — 36415 COLL VENOUS BLD VENIPUNCTURE: CPT

## 2018-01-05 ENCOUNTER — ALLSCRIPTS OFFICE VISIT (OUTPATIENT)
Dept: OTHER | Facility: OTHER | Age: 53
End: 2018-01-05

## 2018-01-08 NOTE — PROGRESS NOTES
Assessment   1  Stroke (434 91) (I63 9)   2  Long term (current) use of anticoagulants (V58 61) (Z79 01)    Plan   Long term (current) use of anticoagulants, Stroke    · Follow-up PRN Evaluation and Treatment  Follow-up  Status: Complete  Done:    29TVR7060   Ordered; For: Long term (current) use of anticoagulants, Stroke; Ordered By: Yajaira Chapa Performed:  Due: 43QUL7267    Discussion/Summary   Discussion Summary:    57-year old male with hypertension, hyperlipidemia, CAD s/p CABG '02 and embolic left MCA infarct in October 2017 who is on Coumadin/ASA  He presents for evaluation  had fluctuating PT/INR and some difficulty with management of dosing/levels  In review of his lab values no grossly elevated INR's  No issues with bleeding  To be expected dosing/levels will fluctuate  would like to consider changing to an alternative agent  Will leave up to cardiology/neurology  He has upcoming appointment with both specialties in the next few weeks  Counseling Documentation With Imm: The patient, patient's family was counseled regarding instructions for management,-- impressions,-- importance of compliance with treatment  Chief Complaint   Chief Complaint Free Text Note Form: I am here because I have problems keeping my INR in range  is new to the practice and referred by Dr Mali Serrano  Patient takes Coumadin 6mg once daily, but takes 7mgs on Tuesday's  He takes ASA daily  Patient suffered a prior CVA in October  He denies any TIA or stroke like symptoms  He offers no other complaints or concerns at this time  History of Present Illness   HPI: 57-year old male with hypertension, hyperlipidemia, CAD s/p CABG w/ L radial artery harvest '02 and embolic left MCA infarct in October 2017 who is on Coumadin/ASA  He presents for evaluation  Coumadin is being managed by PCP  Patient is frustrated with adjusting doses of Coumadin and dietary restrictions  Highest INR 3 4 12/26 otherwise mostly in range   He's not had any issues with bleeding  Review of Systems   Complete Male - Vasc:      Constitutional: No fever or chills, feels well, no tiredness, no recent weight gain or weight loss  Eyes: No sudden vision loss, no blurred vision, no double vision  ENT: no loss of hearing, no nosebleeds, no hoarseness  Cardiovascular: no painful veins,-- no leg pain with walking-- and-- no bleeding veins, but-- regular heart rate,-- no intermittent leg claudication-- and-- no palpitations  Respiratory: No sob, no wheezing, no cough, no sob with exertion, no orthopnea  Gastrointestinal: No nausea, No vomiting, no diarrhea, no blood in stool  Genitourinary: no dysuria, no hematuria, No urinary incontinence, no erectile dysfunction  Musculoskeletal: no limb pain, no limb swelling  Integumentary: no rash, no lesions, no wounds, no ulcer  Neurological: no dementia, no headache, no numbness, no limb weakness, no dizziness, no difficulty walking  Psychiatric: anxiety-- and-- no mood disorder, but-- no depression  Hematologic/Lymphatic: no bleeding disorder, no easy bruising  ROS Reviewed:    ROS reviewed  Active Problems   1  CAD (coronary artery disease) (414 00) (I25 10)   2  Cardiomyopathy (425 4) (I42 9)   3  Dyslipidemia (272 4) (E78 5)   4  Encounter for screening colonoscopy (V76 51) (Z12 11)   5  Enlarged prostate (600 00) (N40 0)   6  Hypertension (401 9) (I10)   7  Medication management (V58 69) (Z79 899)   8  Noncompliance (V15 81) (Z91 19)   9  Stroke (434 91) (I63 9)    Past Medical History   1  History of Patient denies medical problems (V49 89) (Z78 9)  Active Problems And Past Medical History Reviewed: The active problems and past medical history were reviewed and updated today  Surgical History   1  History of Appendectomy   2  History of CABG  Surgical History Reviewed: The surgical history was reviewed and updated today         Family History   Mother 1  Family history of essential hypertension (V17 49) (Z82 49)   2  Family history of malignant neoplasm (V16 9) (Z80 9)  Father    3  Family history of essential hypertension (V17 49) (Z82 49)  Family History Reviewed: The family history was reviewed and updated today  Social History    · Former consumption of alcohol (V11 3) (Z87 898)   · Former smoker (T91 36) (Y59 823)   · History of crack cocaine use   ·   Social History Reviewed: The social history was reviewed and updated today  Current Meds    1  Aspirin 81 MG Oral Tablet Chewable; Take 1 tablet daily  Requested for: 19RVF7764; Last     Rx:17Oct2017 Ordered   2  Atorvastatin Calcium 80 MG Oral Tablet; TAKE ONE TABLET BY MOUTH AT BEDTIME      Requested for: 27NOJ9628; Last Rx:17Oct2017 Ordered   3  Metoprolol Tartrate 25 MG Oral Tablet; TAKE 0 5 TABLET Twice daily  Requested for:     59VRC2355; Last Rx:07Vvw0136 Ordered   4  Warfarin Sodium 1 MG Oral Tablet; TAKE 1 TABLET DAILY AS DIRECTED; Therapy: 36BUM5795 to (Evaluate:27Jan2018)  Requested for: 31RGW3124; Last     Rx:52Ulp0242 Ordered   5  Warfarin Sodium 5 MG Oral Tablet; TAKE 1 TABLET DAILY AS DIRECTED  Requested for:     59OOB3670; Last Rx:83Wij3080 Ordered  Medication List Reviewed: The medication list was reviewed and updated today  Allergies   1  Penicillins    Vitals   Vital Signs    Recorded: 79XKB9387 09:27AM   Temperature 97 4 F, Tympanic   Heart Rate 60, R Radial   Pulse Quality Normal, R Radial   Respiration Quality Normal   Respiration 16   Systolic 358, RUE, Sitting   Diastolic 88, RUE, Sitting   Height 6 ft    Weight 205 lb    BMI Calculated 27 8   BSA Calculated 2 15     Physical Exam        Radial: right 2+-- and-- left 0  Femoral: right 2+-- and-- left 2+  Posterior tibialis: right 2+-- and-- left 2+  Distal Pulse Exam: Normal Capillary Refill  Extremities: No upper or lower extremity edema        LE Varicose Veins: No Varicose Veins are Present  The heart rate was normal  The rhythm was regular  Heart sounds: normal S1-- and-- normal S2       Murmurs: No murmurs were heard  Pulmonary      Respiratory effort: No increased work of breathing or signs of respiratory distress  Auscultation of lungs: Clear to auscultation  No wheezing, no rales, no rhonchi  Abdomen      Abdomen: Abdomen soft, non-tender, no masses, non distended, no rebound tenderness  Psychiatric      Orientation to person, place and time: Normal       Mood and affect: Normal       Eyes      Conjunctiva and lids: No swelling, erythema, or discharge  Ears, Nose, Mouth, and Throat      Hearing: Normal       Neurologic Sensory exam normal      Motor skills intact  Musculoskeletal      Gait and station: Normal       Skin      Skin and subcutaneous tissue: Normal without rashes or lesions  Palpation of skin and subcutaneous tissue: Normal turgor        Venous Disease: No lipodermatosclerosis, stasis dermatitis, hyperpigmentation, or atrophie tanja noted on exam       Results/Data   (1) PT WITH INR 02Jan2018 08:38AM Neolane Order Number: [id number]      Test Name Result Flag Reference   INR 2 58 H 0 86-1 16   PT 28 0 seconds H 12 1-14 4      (1) PT WITH INR 35Lxv3254 08:39AM Neolane Order Number: [id number]      Test Name Result Flag Reference   INR 3 47 H 0 86-1 16   PT 35 4 seconds H 12 1-14 4      (1) PT WITH INR 02ASF4278 07:39AM Neolane Order Number: [id number]      Test Name Result Flag Reference   INR 2 34 H 0 86-1 16   PT 25 9 seconds H 12 1-14 4      (1) PT WITH INR 16ZVJ1560 07:52AM Neolane Order Number: [id number]      Test Name Result Flag Reference   INR 2 01 H 0 86-1 16   PT 23 0 seconds H 12 1-14 4      (1) PT WITH INR 28XWK4358 08:06AM Roxanne Sjogren      Test Name Result Flag Reference   INR 2 14 H 0 86-1 16   PT 24 1 seconds H 12 1-14 4      (1) PT WITH INR 29LLA0817 08:05AM Momail Order Number: [id number]      Test Name Result Flag Reference   INR 2 54 H 0 86-1 16   PT 27 7 seconds H 12 1-14 4      (1) PT WITH INR 65TSA5364 08:07AM Amber Mann      Test Name Result Flag Reference   INR 3 34 H 0 86-1 16   PT 34 4 seconds H 12 1-14 4      (1) PT WITH INR 38ASZ7957 08:07AM Momail Order Number: [id number]      Test Name Result Flag Reference   INR 2 87 H 0 86-1 16   PT 30 5 seconds H 12 1-14 4      (1) PT WITH INR [id number] 08:17AM Momail Order Number: [id number]      Test Name Result Flag Reference   INR 3 02 H 0 86-1 16   PT 31 7 seconds H 12 1-14 4      (1) PT WITH INR [id number] 08:10AM Momail Order Number: [id number]      Test Name Result Flag Reference   INR 3 16 H 0 86-1 16   PT 32 9 seconds H 12 1-14 4      (1) PT WITH INR 40JSG7885 08:27AM Momail Order Number: [id number]      Test Name Result Flag Reference   INR 2 72 H 0 86-1 16   PT 29 2 seconds H 12 1-14 4      (1) PT WITH INR 54Jgg4809 08:10AM Momail Order Number: [id number]      Test Name Result Flag Reference   INR 2 17 H 0 86-1 16   PT 24 4 seconds H 12 1-14 4      (1) PT WITH INR 19Sqy9692 08:14AM Momail Order Number: [id number]      Test Name Result Flag Reference   INR 1 41 H 0 86-1 16   PT 17 3 seconds H 12 1-14 4      Future Appointments      Date/Time Provider Specialty Site   02/21/2018 03:00 PM Amber Mann DO Family Medicine [facility]   02/05/2018 02:00 PM Bigg Carty MD Neurology [place] [address]   01/25/2018 04:40 PM Gamaliel Mary MD Cardiology [facility]     Signatures    Electronically signed by : Jocelyn Calderon; Jan 5 2018 12:40PM EST                       (Author)     Electronically signed by :  Rj Bowens MD; Jan 7 2018  9:31PM EST                       (Author) Electronically signed by :  Jericho Moreau MD; Jan 7 2018  9:32PM EST                       (Author)

## 2018-01-09 ENCOUNTER — GENERIC CONVERSION - ENCOUNTER (OUTPATIENT)
Dept: OTHER | Facility: OTHER | Age: 53
End: 2018-01-09

## 2018-01-09 ENCOUNTER — APPOINTMENT (OUTPATIENT)
Dept: LAB | Facility: MEDICAL CENTER | Age: 53
End: 2018-01-09
Payer: COMMERCIAL

## 2018-01-09 DIAGNOSIS — I63.9 CEREBRAL INFARCTION (HCC): ICD-10-CM

## 2018-01-09 LAB
INR PPP: 2.32 (ref 0.86–1.16)
PROTHROMBIN TIME: 25.7 SECONDS (ref 12.1–14.4)

## 2018-01-09 PROCEDURE — 36415 COLL VENOUS BLD VENIPUNCTURE: CPT

## 2018-01-09 PROCEDURE — 85610 PROTHROMBIN TIME: CPT

## 2018-01-11 NOTE — RESULT NOTES
Verified Results  (1) PT WITH INR 12XWF6149 08:10AM Deidre Degroot Order Number: [id number]     Test Name Result Flag Reference   INR 3 16 H 0 86-1 16   PT 32 9 seconds H 12 1-14 4       Plan  Stroke    · (1) PT WITH INR; Status:Active;  Requested I:28DOR4405;

## 2018-01-13 VITALS
SYSTOLIC BLOOD PRESSURE: 139 MMHG | WEIGHT: 205.13 LBS | BODY MASS INDEX: 27.79 KG/M2 | RESPIRATION RATE: 18 BRPM | HEIGHT: 72 IN | DIASTOLIC BLOOD PRESSURE: 76 MMHG | HEART RATE: 70 BPM | OXYGEN SATURATION: 99 %

## 2018-01-13 VITALS
WEIGHT: 206.5 LBS | SYSTOLIC BLOOD PRESSURE: 112 MMHG | HEART RATE: 58 BPM | RESPIRATION RATE: 18 BRPM | BODY MASS INDEX: 28.01 KG/M2 | DIASTOLIC BLOOD PRESSURE: 62 MMHG

## 2018-01-13 NOTE — RESULT NOTES
Verified Results  (1) PT WITH INR 35Iyb1988 08:07AM Dai Simmons     Test Name Result Flag Reference   INR 3 34 H 0 86-1 16   PT 34 4 seconds H 12 1-14 4

## 2018-01-14 VITALS
BODY MASS INDEX: 27.84 KG/M2 | DIASTOLIC BLOOD PRESSURE: 70 MMHG | HEIGHT: 72 IN | SYSTOLIC BLOOD PRESSURE: 110 MMHG | HEART RATE: 75 BPM | WEIGHT: 205.56 LBS

## 2018-01-14 NOTE — RESULT NOTES
Verified Results  (1) PT WITH INR 54REX3702 08:17AM Junette Longcreek Order Number: [id number]     Test Name Result Flag Reference   INR 3 02 H 0 86-1 16   PT 31 7 seconds H 12 1-14 4       Plan  Stroke    · (1) PT WITH INR; Status:Active;  Requested for:13Nov2017;

## 2018-01-15 ENCOUNTER — APPOINTMENT (OUTPATIENT)
Dept: LAB | Facility: MEDICAL CENTER | Age: 53
End: 2018-01-15
Payer: COMMERCIAL

## 2018-01-15 ENCOUNTER — GENERIC CONVERSION - ENCOUNTER (OUTPATIENT)
Dept: OTHER | Facility: OTHER | Age: 53
End: 2018-01-15

## 2018-01-15 ENCOUNTER — TRANSCRIBE ORDERS (OUTPATIENT)
Dept: ADMINISTRATIVE | Facility: HOSPITAL | Age: 53
End: 2018-01-15

## 2018-01-15 DIAGNOSIS — I63.9 IMPENDING CEREBROVASCULAR ACCIDENT (HCC): Primary | ICD-10-CM

## 2018-01-15 LAB
INR PPP: 2.45 (ref 0.86–1.16)
PROTHROMBIN TIME: 26.9 SECONDS (ref 12.1–14.4)

## 2018-01-15 PROCEDURE — 85610 PROTHROMBIN TIME: CPT | Performed by: FAMILY MEDICINE

## 2018-01-15 PROCEDURE — 36415 COLL VENOUS BLD VENIPUNCTURE: CPT | Performed by: FAMILY MEDICINE

## 2018-01-15 NOTE — RESULT NOTES
Verified Results  (1) PT WITH INR 23Oct2017 08:14AM Nilesh Mayes Order Number: [id number]     Test Name Result Flag Reference   INR 1 41 H 0 86-1 16   PT 17 3 seconds H 12 1-14 4       Plan  Stroke    · (1) PT WITH INR; Status:Active;  Requested BFI:94WMV6501;

## 2018-01-16 ENCOUNTER — GENERIC CONVERSION - ENCOUNTER (OUTPATIENT)
Dept: OTHER | Facility: OTHER | Age: 53
End: 2018-01-16

## 2018-01-16 NOTE — RESULT NOTES
Verified Results  (1) PT WITH INR 47Eew7665 08:27AM Mere Umana Order Number: [id number]     Test Name Result Flag Reference   INR 2 72 H 0 86-1 16   PT 29 2 seconds H 12 1-14 4       Plan  Stroke    · (1) PT WITH INR; Status:Active;  Requested XYA:55WRA3923;

## 2018-01-17 NOTE — MISCELLANEOUS
Assessment    1  Stroke (434 91) (I63 9)   2  CAD (coronary artery disease) (414 00) (I25 10)   3  Hypertension (401 9) (I10)    Plan  CAD (coronary artery disease), Hypertension, Stroke    · Follow-up visit in 1 month Evaluation and Treatment  Follow-up  Status: Complete  Done:  72XRA7548   Ordered; For: CAD (coronary artery disease), Hypertension, Stroke; Ordered By: Vaishnavi Hill Performed:  Due: 30UZV5411; Last Updated By: Ramana Houston; 10/19/2017 11:10:01 AM  Stroke    · Warfarin Sodium 5 MG Oral Tablet; TAKE 1 TABLET DAILY AS DIRECTED   Rx By: Vaishnavi Hill; Dispense: 30 Days ; #:30 Tablet; Refill: 1; For: Stroke; BRIEN = N; Verified Transmission to [facility]; Last Updated By: System, [employer]; 10/19/2017 10:35:04 AM   · (1) PT WITH INR; Status:Active; Requested TBX:31IGN1678;    Perform:[facility]; CAD:31LLD6213; Ordered; For:Stroke; Ordered By:Artie Norris;    Discussion/Summary  Discussion Summary:   Stroke: Patient on Coumadin  I reviewed his hospital records  Unclear how long he will be on Coumadin  I will send a message to neurology for a time frame  In the meantime his INR from Monday was too low at 1 1  He is currently on 3 mg of Coumadin  I will increase that to 5 mg and have him get an INR on Monday  That will be in 4 days  Keep follow-up appointment with Neurology  I encouraged patient to get an MRI  He declined stating he is claustrophobic  I offered to trying get an open MRI but he still declined  There is an obvious compliance issue here  CAD: Patient has seen Cardiology  Recommended he continue his aspirin, statin and beta-blocker  Continue regular follow-up with Cardiology  Hypertension: Well controlled  Continue medications as above  Continue follow-up with Cardiology as well  Patient's mother had colon cancer  He has never had a colonoscopy  I strongly recommended he get a colonoscopy as he is high risk for colon cancer   Patient declined stating there is too many things he has to do at this time  Follow-up in 1 month or sooner if needed  Medication SE Review and Pt Understands Tx: Possible side effects of new medications were reviewed with the patient/guardian today  The treatment plan was reviewed with the patient/guardian  The patient/guardian understands and agrees with the treatment plan      History of Present Illness  TCM Communication  Luke: He was hospitalized at [facility]  The dates of hospitalization: 10/9/2017-10/12/2017  Diagnosis: CVA  He was discharged to home  He scheduled a follow up appointment  The patient is currently asymptomatic  Counseling was provided to patient's family  Communication performed and completed by [facility]   HPI: Patient presents for hospital follow-up  States he recently had a stroke  Symptoms started last week when patient was having slurring of speech, trouble moving and facial droop  He went to [address] [place] [address] and was subsequently transferred to [facility] for stroke  Patient states his slurring and movement problems have resolved  No longer having facial droop  Is having problems with memory recall  States that if too many things are said to him at once he gets confused  He is here with his wife  Patient has an extensive cardiac history as well  Had his 1st heart attack in his 35s and needed CABG  Patient admits to alcohol, tobacco and cocaine use early on in life  States that a few years after his bypass he got fed up with taking medications and stopped taking prescription medications and tried a holistic approach  Unfortunately the recent hospitalization was a sequela of that decision  Patient was placed on Coumadin while in the hospital and had his INR drawn on 10/16/2017  He was seen by Cardiology 2 days ago  Patient was supposed to have an MRI in the hospital but refused and continues to refuse  He has an upcoming appointment with neurology next month        Review of Systems  Complete-Male: Constitutional: no fever  Cardiovascular: no chest pain  Respiratory: no shortness of breath  Active Problems    1  CAD (coronary artery disease) (414 00) (I25 10)   2  Cardiomyopathy (425 4) (I42 9)   3  Dyslipidemia (272 4) (E78 5)   4  Hypertension (401 9) (I10)   5  Stroke (434 91) (I63 9)    Past Medical History    1  History of Patient denies medical problems (V49 89) (Z78 9)    Surgical History    1  History of Appendectomy   2  History of CABG  Surgical History Reviewed: The surgical history was reviewed and updated today  Family History  Mother    1  Family history of essential hypertension (V17 49) (Z82 49)   2  Family history of malignant neoplasm (V16 9) (Z80 9)  Father    3  Family history of essential hypertension (V17 49) (Z82 49)  Family History Reviewed: The family history was reviewed and updated today  Social History    · Former consumption of alcohol (V11 3) (Z87 898)   · Former smoker (Z46 46) (W62 468)   · History of crack cocaine use   ·   Social History Reviewed: The social history was reviewed and updated today  Current Meds   1  Aspirin 81 MG Oral Tablet Chewable; Take 1 tablet daily  Requested for: 60MRY9481; Last   Rx:17Oct2017 Ordered   2  Atorvastatin Calcium 80 MG Oral Tablet; TAKE ONE TABLET BY MOUTH AT BEDTIME    Requested for: 82UQT8158; Last Rx:17Oct2017 Ordered   3  Metoprolol Tartrate 25 MG Oral Tablet; TAKE 0 5 TABLET Twice daily  Requested for:   26SYK3269; Last Rx:17Oct2017 Ordered   4  Warfarin Sodium 3 MG Oral Tablet; TAKE 1 TABLET DAILY; Therapy: (Recorded:19Oct2017) to Recorded  Medication List Reviewed: The medication list was reviewed and updated today  Allergies    1   Penicillins    Vitals  Signs   Recorded: 45YSL7316 09:49AM   Heart Rate: 76  Respiration: 16  Systolic: 415  Diastolic: 80  Weight: 104 lb 2 oz  BMI Calculated: 27 82  BSA Calculated: 2 15    Physical Exam    Constitutional   General appearance: No acute distress, well appearing and well nourished  Eyes   Conjunctiva and lids: No swelling, erythema, or discharge  Pupils and irises: Equal, round and reactive to light  Ears, Nose, Mouth, and Throat   External inspection of ears and nose: Normal     Otoscopic examination: Tympanic membrance translucent with normal light reflex  Canals patent without erythema  Nasal mucosa, septum, and turbinates: Normal without edema or erythema  Oropharynx: Normal with no erythema, edema, exudate or lesions  Pulmonary   Respiratory effort: No increased work of breathing or signs of respiratory distress  Auscultation of lungs: Clear to auscultation, equal breath sounds bilaterally, no wheezes, no rales, no rhonci  Cardiovascular   Auscultation of heart: Normal rate and rhythm, normal S1 and S2, without murmurs  Examination of extremities for edema and/or varicosities: Normal     Lymphatic   Palpation of lymph nodes in neck: No lymphadenopathy  Musculoskeletal   Gait and station: Normal     Neurologic   Cranial nerves: Cranial nerves 2-12 intact  Psychiatric   Orientation to person, place and time: Normal     Mood and affect: Normal          Future Appointments    Date/Time Provider Specialty Site   11/21/2017 10:15 AM JENNIFER Lomax   [address]   11/02/2017 11:30 AM Fan Baires MD Neurology [place]     Signatures   Electronically signed by : JENNIFER Khan ; Oct 19 2017 11:12AM EST                       (Author)

## 2018-01-17 NOTE — RESULT NOTES
Verified Results  (1) PT WITH INR 60Vgb3562 08:10AM Vanessa Moran Order Number: [id number]     Test Name Result Flag Reference   INR 2 17 H 0 86-1 16   PT 24 4 seconds H 12 1-14 4

## 2018-01-22 VITALS
RESPIRATION RATE: 16 BRPM | BODY MASS INDEX: 27.82 KG/M2 | SYSTOLIC BLOOD PRESSURE: 124 MMHG | WEIGHT: 205.13 LBS | HEART RATE: 76 BPM | DIASTOLIC BLOOD PRESSURE: 80 MMHG

## 2018-01-22 VITALS
TEMPERATURE: 97.4 F | RESPIRATION RATE: 16 BRPM | WEIGHT: 205 LBS | BODY MASS INDEX: 27.77 KG/M2 | SYSTOLIC BLOOD PRESSURE: 126 MMHG | DIASTOLIC BLOOD PRESSURE: 88 MMHG | HEART RATE: 60 BPM | HEIGHT: 72 IN

## 2018-01-23 NOTE — RESULT NOTES
Verified Results  (1) PT WITH INR 03Iwh1691 08:39AM Jessyzenabutch Grant Order Number: [id number]     Test Name Result Flag Reference   INR 3 47 H 0 86-1 16   PT 35 4 seconds H 12 1-14 4       Plan  Stroke    · (1) PT WITH INR; Status:Active;  Requested MGL:01GYD3280;

## 2018-01-23 NOTE — RESULT NOTES
Verified Results  (1) PT WITH INR [id number] 08:38AM Liliam Triana Order Number: [id number]     Test Name Result Flag Reference   INR 2 58 H 0 86-1 16   PT 28 0 seconds H 12 1-14 4

## 2018-01-23 NOTE — RESULT NOTES
Verified Results  (1) PT WITH INR [id number] 08:32AM Addie Gena Order Number: [id number]     Test Name Result Flag Reference   INR 2 32 H 0 86-1 16   PT 25 7 seconds H 12 1-14 4       Plan  Stroke    · (1) PT WITH INR; Status:Active;  Requested ELU:[id number];

## 2018-01-23 NOTE — RESULT NOTES
Verified Results  (1) PT WITH INR [id number] 08:37AM Caitlin Copper     Test Name Result Flag Reference   INR 2 45 H 0 86-1 16   PT 26 9 seconds H 12 1-14 4       Plan  Long term (current) use of anticoagulants    · (1) PT WITH INR; Status:Active;  Requested EMM:[id number];

## 2018-01-23 NOTE — RESULT NOTES
Verified Results  (1) PT WITH INR [id number] 07:52AM Young Sexton Order Number: [id number]     Test Name Result Flag Reference   INR 2 01 H 0 86-1 16   PT 23 0 seconds H 12 1-14 4       Plan  Stroke    · (1) PT WITH INR; Status:Active;  Requested for:[id number];

## 2018-01-23 NOTE — RESULT NOTES
Verified Results  (1) PT WITH INR [id number] 07:39AM Shanda Villavicencio Order Number: [id number]     Test Name Result Flag Reference   INR 2 34 H 0 86-1 16   PT 25 9 seconds H 12 1-14 4       Plan  Stroke    · (1) PT WITH INR; Status:Active;  Requested for:[id number];

## 2018-01-23 NOTE — RESULT NOTES
Verified Results  (1) PT WITH INR [id number] 08:06AM Malena Mercury     Test Name Result Flag Reference   INR 2 14 H 0 86-1 16   PT 24 1 seconds H 12 1-14 4       Plan  Stroke    · (1) PT WITH INR; Status:Active;  Requested for:[id number];

## 2018-01-24 DIAGNOSIS — I63.9 CEREBROVASCULAR ACCIDENT (CVA), UNSPECIFIED MECHANISM (HCC): Primary | ICD-10-CM

## 2018-01-25 ENCOUNTER — OFFICE VISIT (OUTPATIENT)
Dept: CARDIOLOGY CLINIC | Facility: CLINIC | Age: 53
End: 2018-01-25
Payer: COMMERCIAL

## 2018-01-25 VITALS
BODY MASS INDEX: 27.5 KG/M2 | SYSTOLIC BLOOD PRESSURE: 114 MMHG | HEART RATE: 74 BPM | WEIGHT: 203 LBS | DIASTOLIC BLOOD PRESSURE: 62 MMHG | HEIGHT: 72 IN

## 2018-01-25 DIAGNOSIS — I25.10 CORONARY ARTERY DISEASE INVOLVING NATIVE CORONARY ARTERY OF NATIVE HEART WITHOUT ANGINA PECTORIS: ICD-10-CM

## 2018-01-25 DIAGNOSIS — E78.5 DYSLIPIDEMIA: ICD-10-CM

## 2018-01-25 DIAGNOSIS — I63.9 CEREBROVASCULAR ACCIDENT (CVA), UNSPECIFIED MECHANISM (HCC): Primary | ICD-10-CM

## 2018-01-25 DIAGNOSIS — I10 ESSENTIAL HYPERTENSION: ICD-10-CM

## 2018-01-25 DIAGNOSIS — I63.412 CEREBROVASCULAR ACCIDENT (CVA) DUE TO EMBOLISM OF LEFT MIDDLE CEREBRAL ARTERY (HCC): Primary | ICD-10-CM

## 2018-01-25 DIAGNOSIS — I25.5 ISCHEMIC CARDIOMYOPATHY: ICD-10-CM

## 2018-01-25 PROBLEM — I42.9 CARDIOMYOPATHY (HCC): Status: ACTIVE | Noted: 2017-10-17

## 2018-01-25 PROBLEM — Z95.1 S/P CABG (CORONARY ARTERY BYPASS GRAFT): Chronic | Status: RESOLVED | Noted: 2017-10-10 | Resolved: 2018-01-25

## 2018-01-25 PROBLEM — Z79.01 LONG TERM CURRENT USE OF ANTICOAGULANT THERAPY: Status: ACTIVE | Noted: 2018-01-25

## 2018-01-25 PROBLEM — N40.0 ENLARGED PROSTATE: Status: ACTIVE | Noted: 2017-11-21

## 2018-01-25 PROBLEM — N40.0 ENLARGED PROSTATE: Status: RESOLVED | Noted: 2017-11-21 | Resolved: 2018-01-25

## 2018-01-25 PROCEDURE — 99215 OFFICE O/P EST HI 40 MIN: CPT | Performed by: INTERNAL MEDICINE

## 2018-01-25 NOTE — PROGRESS NOTES
Cardiology Follow up Note    Tita Alcantara 50 y o  male MRN: [id number]    01/25/18          Assessment/Plan:    1  Ischemic cardiomyopathy  He is not sure if his LV function was severely reduced at time of CABG in 2002, but just reports he had a bad heart  Norma Dasher nuclear stress test 11/17 showed moderate to large, moderately severe, partially reversible defect of inferior and inferolateral walls  EF 20% with severe global hypokinesis  TID ratio 0 92  Given severe LV dysfunction, would recommend cardiac cath to evaluate for any significant lesions that might be intervenable to improve LV function  If no intervenable lesions, would likely be a candidate for ICD for primary prevention, he is not sure if he would want to pursue that at this time despite discussing fact that it would be for primary prevention of sudden cardiac death  2  CVA  He was started on Coumadin as per Neurology  INR monitoring by Dr Sushant Rivas  No cardiac etiology of stroke identified  3  HTN  BP controlled to low on Metoprolol 12 5 bid  Would like to add ACE I or Entresto, but will wait until after cath to do so  4  HL  On high dose statin  Lipid panel 10/17 showed total cholesterol 216, , HDL 29,   Due for repeat lipid panel  5  CAD s/p CABG  On aspirin, statin, beta blocker  HPI:     50year old man with a history of CAD with prior stents and subsequent reported 4 vessel CABG 2002 at [facility], HTN, HL, tobacco use, who is here for follow up of ICM with EF 20%, CAD, HTN, HL, and CVA  He reports he was following with Dr Melissa Arnold, but reports he was still having chest discomfort, shortness of breath, and dizziness, and nothing was done about it, so stopped taking his medications and following up since about 2004  He was admitted to [facility] 10/17 with aphasia, found to have L MCA M3 division thrombus s/p tPA, unable to perform embolectomy due to distal position of thrombus       Echo 10/17 showed moderately dilated LV, severely reduced function with EF 20%, mild LVH, no evidence of apical thrombus, RV mildly dilated with mildly reduced function  LA moderately dilated  Mild MR  KRISTINA was done showing normal LV size and function, EF severely reduced at 20-25%, severe diffuse hypokinesis, LVH  Normal RV size and function  No thrombus seen in LA or ELISA  No PFO  Mild MR  Aortic sclerosis without stenosis  Matias Hooks nuclear stress test 11/17 showed moderate to large, moderately severe, partially reversible defect of inferior and inferolateral walls  EF 20% with severe global hypokinesis  TID ratio 0 92  He was started on warfarin as per Neurology  He stopped smoking  He reports he was smoking 1/2-3/4 ppd for about 40 years  His speech is OK, but he still has issues with memory  Prior to stroke, he was having chest pain, not related to exertion, would last for several minutes, resolved on its own  He reports since stroke he has not had any further significant chest pain  No shortness of breath unless he climbs more than 2 flights of stairs  No LE edema  Occasional dizziness, no syncope        Patient Active Problem List   Diagnosis    Cerebrovascular accident (CVA) due to embolism of middle cerebral artery ([facility] [address] )    Hypertension    CAD (coronary artery disease)    Ischemic cardiomyopathy    Dyslipidemia    Long term current use of anticoagulant therapy       Allergies   Allergen Reactions    Penicillins          Current Outpatient Prescriptions:     aspirin 81 mg chewable tablet, Chew 1 tablet daily, Disp: 30 tablet, Rfl: 0    atorvastatin (LIPITOR) 80 mg tablet, Take 1 tablet by mouth every evening, Disp: 30 tablet, Rfl: 0    metoprolol tartrate (LOPRESSOR) 25 mg tablet, Take 0 5 tablets by mouth every 12 (twelve) hours, Disp: 60 tablet, Rfl: 0    warfarin (COUMADIN) 3 mg tablet, Take 1 tablet by mouth daily, Disp: 3 tablet, Rfl: 0    Past Medical History:   Diagnosis Date    Cardiac disease     Hx of CABG        Family History   Problem Relation Age of Onset    Hypertension Mother     Hypertension Father        Past Surgical History:   Procedure Laterality Date    APPENDECTOMY      CORONARY ARTERY BYPASS GRAFT         Social History     Social History    Marital status: /Civil Union     Spouse name: N/A    Number of children: N/A    Years of education: N/A     Occupational History    Not on file  Social History Main Topics    Smoking status: Former Smoker    Smokeless tobacco: Former User    Alcohol use Yes    Drug use: No    Sexual activity: Not on file     Other Topics Concern    Not on file     Social History Narrative    No narrative on file       Review of Systems   Constitution: Negative for chills, decreased appetite, diaphoresis, fever, weakness, malaise/fatigue, night sweats, weight gain and weight loss  HENT: Negative for ear pain, hearing loss, hoarse voice, nosebleeds, sore throat and tinnitus  Eyes: Negative for blurred vision and pain  Cardiovascular: Negative  Negative for chest pain, claudication, cyanosis, dyspnea on exertion, irregular heartbeat, leg swelling, near-syncope, orthopnea, palpitations, paroxysmal nocturnal dyspnea and syncope  Respiratory: Negative for cough, hemoptysis, shortness of breath, sleep disturbances due to breathing, snoring, sputum production and wheezing  Hematologic/Lymphatic: Negative for adenopathy and bleeding problem  Does not bruise/bleed easily  Skin: Negative for color change, dry skin, flushing, itching, poor wound healing and rash  Musculoskeletal: Negative for arthritis, back pain, falls, joint pain, muscle cramps, muscle weakness, myalgias and neck pain  Gastrointestinal: Negative for abdominal pain, constipation, diarrhea, dysphagia, heartburn, hematemesis, hematochezia, melena, nausea and vomiting  Genitourinary: Negative for dysuria, frequency, hematuria, hesitancy, non-menstrual bleeding and urgency  Neurological: Negative for excessive daytime sleepiness, dizziness, focal weakness, headaches, light-headedness, loss of balance, numbness, paresthesias, tremors and vertigo  Psychiatric/Behavioral: Negative for altered mental status, depression and memory loss  The patient does not have insomnia and is not nervous/anxious  Allergic/Immunologic: Negative for environmental allergies and persistent infections  Vitals: /62 (BP Location: Right arm, Patient Position: Sitting, Cuff Size: Large)   Pulse 74   Ht 6' (1 829 m)   Wt 92 1 kg (203 lb)   BMI 27 53 kg/m²       Physical Exam:     GEN: Alert and oriented x 3, in no acute distress  Well appearing and well nourished  HEENT: Sclera anicteric, conjunctivae pink, mucous membranes moist  Oropharynx clear  NECK: Supple, no carotid bruits, no significant JVD  Trachea midline, no thyromegaly  HEART: Regular rhythm, normal S1 and S2, no murmurs, clicks, gallops or rubs  PMI nondisplaced, no thrills  LUNGS: Clear to auscultation bilaterally; no wheezes, rales, or rhonchi  No increased work of breathing or signs of respiratory distress  ABDOMEN: Soft, nontender, nondistended, normoactive bowel sounds  EXTREMITIES: Skin warm and well perfused, no clubbing, cyanosis, or edema  NEURO: No focal findings  Normal gait  Normal speech  Mood and affect normal    SKIN: Normal without suspicious lesions on exposed skin        Lab Results:       No results found for: HGBA1C  Lab Results   Component Value Date    CHOL 216 (H) 10/10/2017     Lab Results   Component Value Date    HDL 29 (L) 10/10/2017     Lab Results   Component Value Date    LDLCALC 143 (H) 10/10/2017     Lab Results   Component Value Date    TRIG 220 (H) 10/10/2017     No components found for: [place], [place]      Cardiac testing:   Results for orders placed during the hospital encounter of 10/09/17   Echo complete with contrast if indicated    Narrative [address]  [phone]    Transthoracic Echocardiogram  2D, M-mode, Doppler, and Color Doppler    Study date:  10-Oct-2017    Patient: Edward Phelan  MR number: [id number]  Account number: [de-identified]  : 1965  Age: 50 years  Gender: Male  Status: Inpatient  Location: Bedside  Height: 71 in  Weight: 218 lb  BP: 115/ 93 mmHg    Indications: CVA    Diagnoses: I63 9 - Cerebral infarction, unspecified    Sonographer:  MELODIE Koroma  Primary Physician:  Sena Huerta MD  Referring Physician:  Fabiano Grant DO  Group:  [id number]  Cardiology Fellow:  Luis Hatfield MD  Interpreting Physician:  Elder Lyman DO    IMPRESSIONS:  Although no cardiac source of embolism was identified, the possibility cannot be ruled out on the basis of this study  SUMMARY    PROCEDURE INFORMATION:  Intravenous contrast (Definity solution [1 3 ml Definity/8 7ml normal saline solution], 4 ml) was administered to opacify the left ventricle  LEFT VENTRICLE:  The ventricle was moderately dilated  Systolic function was severely reduced  Ejection fraction was estimated to be 20 %  There was severe diffuse hypokinesis  There was mild concentric hypertrophy  Diastolic dysfunction with normal LV filling pressures  No evidence of apical thrombus  RIGHT VENTRICLE:  The ventricle was mildly dilated  Systolic function was mildly reduced  LEFT ATRIUM:  The atrium was moderately dilated  MITRAL VALVE:  There was mild regurgitation  AORTIC VALVE:  The valve was not visualized well enough to rule out a bicuspid morphology  Leaflets exhibited normal thickness and normal cuspal separation  RECOMMENDATIONS:  If clinically indicated, transesophageal echocardiography could provide additional information  HISTORY: PRIOR HISTORY: CABG    PROCEDURE: The procedure was performed at the bedside  This was a routine study  The transthoracic approach was used  The study included complete 2D imaging, M-mode, complete spectral Doppler, and color Doppler  The heart rate was 91 bpm,  at the start of the study  Images were obtained from the parasternal, apical, subcostal, and suprasternal notch acoustic windows  Intravenous contrast (Definity solution [1 3 ml Definity/8 7ml normal saline solution], 4 ml) was  administered to opacify the left ventricle  Echocardiographic views were limited due to lung interference  Image quality was adequate  LEFT VENTRICLE: The ventricle was moderately dilated  Systolic function was severely reduced  Ejection fraction was estimated to be 20 %  There was severe diffuse hypokinesis  Wall thickness was mildly increased  There was mild concentric  hypertrophy  No evidence of apical thrombus  DOPPLER: Diastolic dysfunction with normal LV filling pressures  RIGHT VENTRICLE: The ventricle was mildly dilated  Systolic function was mildly reduced  Wall thickness was normal     LEFT ATRIUM: The atrium was moderately dilated  RIGHT ATRIUM: Size was at the upper limits of normal     MITRAL VALVE: Valve structure was normal  There was normal leaflet separation  DOPPLER: The transmitral velocity was within the normal range  There was no evidence for stenosis  There was mild regurgitation  AORTIC VALVE: The valve was not visualized well enough to rule out a bicuspid morphology  Leaflets exhibited normal thickness and normal cuspal separation  DOPPLER: Transaortic velocity was within the normal range  There was no evidence  for stenosis  There was no regurgitation  TRICUSPID VALVE: The valve structure was normal  There was normal leaflet separation  DOPPLER: The transtricuspid velocity was within the normal range  There was no evidence for stenosis  There was trace regurgitation  Pulmonary artery  systolic pressure was within the normal range      PULMONIC VALVE: Leaflets exhibited normal thickness, no calcification, and normal cuspal separation  DOPPLER: The transpulmonic velocity was within the normal range  There was trace regurgitation  PERICARDIUM: There was no pericardial effusion  AORTA: The root exhibited normal size for BSA  SYSTEMIC VEINS: IVC: The inferior vena cava was normal in size and course  Respirophasic changes were normal     SYSTEM MEASUREMENT TABLES    2D mode  AV Diam: 3 8 cm  AoR Diam; Mean (2D): 3 8 cm  LA Diam (2D): 5 cm  LA Dimension; Mean (2D): 5 cm  LA/Ao (2D): 1 32  EDV (2D-Cubed): 268 cm3  EF (2D-Cubed): 26 9 %  ESV (2D-Cubed): 196 cm3  FS (2D-Cubed): 9 92 %  FS (2D-Teich): 9 92 %  IVS/LVPW (2D): 1 03  IVSd (2D): 1 19 cm  IVSd; Mean chosen (2D): 1 19 cm  LVEDV MOD BP: 292 cm3  LVESV MOD (BP): 210 cm3  LVIDd (2D): 6 45 cm  LVIDd; Mean (2D): 6 45 cm  LVIDs (2D): 5 81 cm  LVIDs; Mean (2D): 5 81 cm  LVPWd (2D): 1 16 cm  LVPWd; Mean (2D): 1 16 cm  Left Ventricular Ejection Fraction; Method of Disks, Biplane; 2D mode;: 28 1 %  Left Ventricular Ejection Fraction; Teichholz; 2D mode;: 21 2 %  Left Ventricular End Diastolic Volume; Teichholz; 2D mode;: 212 cm3  Left Ventricular End Systolic Volume; Teichholz; 2D mode;: 167 cm3  SV (2D-Cubed): 72 cm3  SV (BP): 82 cm3  Stroke Volume;  Teichholz; 2D mode;: 45 cm3  RVIDd (2D): 4 36 cm  RVIDd; Mean (2D): 4 36 cm    Apical four chamber  LV MOD Diam; End Diastole; (A4C): 2 05 cm  LV MOD Diam; End Diastole; (A4C): 6 55 cm  LV MOD Diam; End Diastole; (A4C): 5 55 cm  LV MOD Diam; End Diastole; (A4C): 6 21 cm  LV MOD Diam; End Diastole; (A4C): 6 7 cm  LV MOD Diam; End Diastole; (A4C): 6 79 cm  LV MOD Diam; End Diastole; (A4C): 5 73 cm  LV MOD Diam; End Diastole; (A4C): 5 97 cm  LV MOD Diam; End Diastole; (A4C): 6 15 cm  LV MOD Diam; End Diastole; (A4C): 6 39 cm  LV MOD Diam; End Diastole; (A4C): 6 61 cm  LV MOD Diam; End Diastole; (A4C): 6 79 cm  LV MOD Diam; End Diastole; (A4C): 6 94 cm  LV MOD Diam; End Diastole; (A4C): 7 cm  LV MOD Diam; End Diastole; (A4C): 6 94 cm  LV MOD Diam; End Diastole; (A4C): 2 93 cm  LV MOD Diam; End Diastole; (A4C): 4 1 cm  LV MOD Diam; End Diastole; (A4C): 4 8 cm  LV MOD Diam; End Diastole; (A4C): 5 34 cm  LV MOD Diam; End Diastole; (A4C): 6 73 cm  LV MOD Diam; End Systole; (A4C): 2 5 cm  LV MOD Diam; End Systole; (A4C): 5 79 cm  LV MOD Diam; End Systole; (A4C): 6 52 cm  LV MOD Diam; End Systole; (A4C): 1 99 cm  LV MOD Diam; End Systole; (A4C): 2 81 cm  LV MOD Diam; End Systole; (A4C): 3 26 cm  LV MOD Diam; End Systole; (A4C): 3 89 cm  LV MOD Diam; End Systole; (A4C): 4 55 cm  LV MOD Diam; End Systole; (A4C): 5 1 cm  LV MOD Diam; End Systole; (A4C): 5 55 cm  LV MOD Diam; End Systole; (A4C): 5 94 cm  LV MOD Diam; End Systole; (A4C): 6 18 cm  LV MOD Diam; End Systole; (A4C): 6 27 cm  LV MOD Diam; End Systole; (A4C): 6 27 cm  LV MOD Diam; End Systole; (A4C): 6 18 cm  LV MOD Diam; End Systole; (A4C): 6 09 cm  LV MOD Diam; End Systole; (A4C): 6 cm  LV MOD Diam; End Systole; (A4C): 6 15 cm  LV MOD Diam; End Systole; (A4C): 6 46 cm  LV MOD Diam; End Systole; (A4C): 6 3 cm  LV MOD Diam; Recent value; End Diastole (A4C): 3 51 cm  LV MOD Diam; Recent value; End Diastole (A4C): 7 02 cm  LV MOD Diam; Recent value; End Diastole (A4C): 7 22 cm  LV MOD Diam; Recent value; End Diastole (A4C): 6 65 cm  LV MOD Diam; Recent value; End Diastole (A4C): 6 17 cm  LV MOD Diam; Recent value; End Diastole (A4C): 7 02 cm  LV MOD Diam; Recent value; End Diastole (A4C): 6 93 cm  LV MOD Diam; Recent value; End Diastole (A4C): 4 4 cm  LV MOD Diam; Recent value; End Diastole (A4C): 5 09 cm  LV MOD Diam; Recent value; End Diastole (A4C): 5 51 cm  LV MOD Diam; Recent value; End Diastole (A4C): 5 79 cm  LV MOD Diam; Recent value; End Diastole (A4C): 5 98 cm  LV MOD Diam; Recent value; End Diastole (A4C): 6 23 cm  LV MOD Diam; Recent value; End Diastole (A4C): 6 39 cm  LV MOD Diam; Recent value; End Diastole (A4C): 6 68 cm  LV MOD Diam; Recent value;  End Diastole (A4C): 6 96 cm  LV MOD Diam; Recent value; End Diastole (A4C): 7 12 cm  LV MOD Diam; Recent value; End Diastole (A4C): 7 12 cm  LV MOD Diam; Recent value; End Diastole (A4C): 7 06 cm  LV MOD Diam; Recent value; End Diastole (A4C): 6 9 cm  LV MOD Diam; Recent value; End Systole (A4C): 2 75 cm  LV MOD Diam; Recent value; End Systole (A4C): 5 95 cm  LV MOD Diam; Recent value; End Systole (A4C): 6 01 cm  LV MOD Diam; Recent value; End Systole (A4C): 5 82 cm  LV MOD Diam; Recent value; End Systole (A4C): 5 7 cm  LV MOD Diam; Recent value; End Systole (A4C): 5 95 cm  LV MOD Diam; Recent value; End Systole (A4C): 6 11 cm  LV MOD Diam; Recent value; End Systole (A4C): 5 35 cm  LV MOD Diam; Recent value; End Systole (A4C): 5 89 cm  LV MOD Diam; Recent value; End Systole (A4C): 6 11 cm  LV MOD Diam; Recent value; End Systole (A4C): 6 07 cm  LV MOD Diam; Recent value; End Systole (A4C): 6 04 cm  LV MOD Diam; Recent value; End Systole (A4C): 6 14 cm  LV MOD Diam; Recent value; End Systole (A4C): 6 39 cm  LV MOD Diam; Recent value; End Systole (A4C): 6 61 cm  LV MOD Diam; Recent value; End Systole (A4C): 6 49 cm  LV MOD Diam; Recent value; End Systole (A4C): 2 69 cm  LV MOD Diam; Recent value; End Systole (A4C): 3 89 cm  LV MOD Diam; Recent value; End Systole (A4C): 4 65 cm  LV MOD Diam; Recent value; End Systole (A4C): 5 85 cm  LVAd (A4C): 6050 mm2  LVAs (A4C): 5150 mm2  LVED MOD A4C: 288 cm3  LVEF MOD A4C: 21 7 %  LVESV MOD A4C: 225 cm3  LVLD A4C: 10 3 cm  LVLS A4C: 9 86 cm  Left Ventricle diastolic major axis; Most recent value chosen; Method of Disks, Single Plane; 2D mode; Apical four chamber;: 9 43 cm  Left Ventricle systolic major axis; Most recent value chosen; Method of Disks, Single Plane; 2D mode; Apical four chamber;: 9 43 cm  Left Ventricular Diastolic Area; Most recent value chosen; Method of Disks, Single Plane; 2D mode; Apical four chamber;: 5960 mm2  Left Ventricular End Diastolic Volume;  Most recent value chosen; Method of Disks, Single Plane; 2D mode; Apical four chamber;: 300 cm3  Left Ventricular End Systolic Volume; Most recent value chosen; Method of Disks, Single Plane; 2D mode; Apical four chamber;: 235 cm3  Left Ventricular Systolic Area; Most recent value chosen; Method of Disks, Single Plane; 2D mode; Apical four chamber;: 5230 mm2  SV MOD A4C: 65 cm3    Apical two chamber  LV MOD Diam; Recent value; End Diastole (A2C): 2 9 cm  LV MOD Diam; Recent value; End Diastole (A2C): 6 86 cm  LV MOD Diam; Recent value; End Diastole (A2C): 6 9 cm  LV MOD Diam; Recent value; End Diastole (A2C): 6 39 cm  LV MOD Diam; Recent value; End Diastole (A2C): 2 33 cm  LV MOD Diam; Recent value; End Diastole (A2C): 3 07 cm  LV MOD Diam; Recent value; End Diastole (A2C): 3 73 cm  LV MOD Diam; Recent value; End Diastole (A2C): 4 34 cm  LV MOD Diam; Recent value; End Diastole (A2C): 4 69 cm  LV MOD Diam; Recent value; End Diastole (A2C): 5 11 cm  LV MOD Diam; Recent value; End Diastole (A2C): 5 4 cm  LV MOD Diam; Recent value; End Diastole (A2C): 5 56 cm  LV MOD Diam; Recent value; End Diastole (A2C): 5 62 cm  LV MOD Diam; Recent value; End Diastole (A2C): 5 71 cm  LV MOD Diam; Recent value; End Diastole (A2C): 5 84 cm  LV MOD Diam; Recent value; End Diastole (A2C): 5 94 cm  LV MOD Diam; Recent value; End Diastole (A2C): 6 13 cm  LV MOD Diam; Recent value; End Diastole (A2C): 6 64 cm  LV MOD Diam; Recent value; End Diastole (A2C): 7 02 cm  LV MOD Diam; Recent value; End Diastole (A2C): 6 42 cm  LV MOD Diam; Recent value; End Systole (A2C): 3 74 cm  LV MOD Diam; Recent value; End Systole (A2C): 6 08 cm  LV MOD Diam; Recent value; End Systole (A2C): 6 33 cm  LV MOD Diam; Recent value; End Systole (A2C): 5 95 cm  LV MOD Diam; Recent value; End Systole (A2C): 5 48 cm  LV MOD Diam; Recent value; End Systole (A2C): 4 18 cm  LV MOD Diam; Recent value; End Systole (A2C): 4 66 cm  LV MOD Diam; Recent value; End Systole (A2C): 4 97 cm  LV MOD Diam; Recent value;  End Systole [facility]): 5 1 cm  LV MOD Diam; Recent value; End Systole (A2C): 4 97 cm  LV MOD Diam; Recent value; End Systole (A2C): 4 91 cm  LV MOD Diam; Recent value; End Systole (A2C): 5 cm  LV MOD Diam; Recent value; End Systole (A2C): 5 16 cm  LV MOD Diam; Recent value; End Systole (A2C): 5 29 cm  LV MOD Diam; Recent value; End Systole (A2C): 2 47 cm  LV MOD Diam; Recent value; End Systole (A2C): 3 17 cm  LV MOD Diam; Recent value; End Systole (A2C): 3 71 cm  LV MOD Diam; Recent value; End Systole (A2C): 5 73 cm  LV MOD Diam; Recent value; End Systole (A2C): 6 24 cm  LV MOD Diam; Recent value; End Systole (A2C): 6 3 cm  LVEF MOD A2C: 26 4 %  Left Ventricle diastolic major axis; Most recent value chosen; Method of Disks, Single Plane; 2D mode; Apical two chamber;: 10 7 cm  Left Ventricle systolic major axis; Most recent value chosen; Method of Disks, Single Plane; 2D mode; Apical two chamber;: 9 21 cm  Left Ventricular Diastolic Area; Most recent value chosen; Method of Disks, Single Plane; 2D mode; Apical two chamber;: 5710 mm2  Left Ventricular End Diastolic Volume; Most recent value chosen; Method of Disks, Single Plane; 2D mode; Apical two chamber;: 253 cm3  Left Ventricular End Systolic Volume; Most recent value chosen; Method of Disks, Single Plane; 2D mode; Apical two chamber;: 187 cm3  Left Ventricular Systolic Area; Most recent value chosen; Method of Disks, Single Plane; 2D mode; Apical two chamber;: 4600 mm2  SV MOD A2C: 67 cm3    Tissue Doppler Imaging  LV Peak Early Ramirez Tissue Jairon; Medial MA (TDI): 81 6 mm/s  Left Ventricular Peak Early Diastolic Tissue Velocity; Mean; Mean value chosen; Medial Mitral Annulus; Tissue Doppler Imaging;: 81 6 mm/s    Unspecified Scan Mode  MV Peak Jairon/LV Peak Tissue Jairon E-Wave; Medial MA: 9 2  Dec [place]; Antegrade Flow: 5050 mm/s2  Dec [place]; Mean;  Antegrade Flow: 5050 mm/s2  MV A Jairon: 955 mm/s  MV E Jairon: 747 mm/s  MV E/A Ratio: 0 8  MV Peak A Jairon: 955 mm/s  MV Peak E Jairon; Mean; Antegrade Flow: 747 mm/s    [facility] Accredited Echocardiography Laboratory    Prepared and electronically signed by    Malcolm Kessler DO  Signed 10-Oct-2017 14:58:43       Results for orders placed during the hospital encounter of 10/09/17   KRISTINA    Narrative [address]  [facility], [address] [facility]  [phone]    Transesophageal Echocardiogram  2D, Doppler, and Color Doppler    Study date:  12-Oct-2017    Patient: Jacque Montero  MR number: [id number]  Account number: [de-identified]  : 1965  Age: 50 years  Gender: Male  Status: Inpatient  Location: Echo lab  Height: 71 in  Weight: 218 lb  BP: 143/ 92 mmHg    Indications: CVA    Diagnoses: 56 - CVA    Sonographer:  Cailin Kent RN, RCS  Interpreting Physician:  iAram Roman MD  Primary Physician:  Trino Pablo  Referring Physician:  Patria Quincy:  [facility]    SUMMARY    LEFT VENTRICLE:  Systolic function was severely reduced  Ejection fraction was estimated to be 20 - 25 %  There was severe diffuse hypokinesis  Concentric hypertrophy was present  ATRIAL SEPTUM:  No defect or patent foramen ovale was identified by color Doppler or after injection of agitated saline  HISTORY: PRIOR HISTORY: CAD, Dilated LV with Low Ejection Fraction    PROCEDURE: The procedure was performed in the echo lab  This was a routine study  The risks and alternatives of the procedure were explained to the patient and informed consent was obtained  The transesophageal approach was used  The study  included complete 2D imaging, complete spectral Doppler, and color Doppler  The heart rate was 83 bpm, at the start of the study  Intubated with ease  Two intubation attempt(s)  There was no blood detected on the probe  Image quality was  good  MEDICATIONS: Benzocaine spray, topical to oropharynx, prior to procedure   Sedation administered by anesthesia team     LEFT VENTRICLE: Size was normal  Systolic function was severely reduced  Ejection fraction was estimated to be 20 - 25 %  There was severe diffuse hypokinesis  Concentric hypertrophy was present  RIGHT VENTRICLE: The size was normal  Systolic function was normal     LEFT ATRIUM: Size was normal  No thrombus was identified  APPENDAGE: The size was normal  No thrombus was identified  DOPPLER: The function was normal (normal emptying velocity)  ATRIAL SEPTUM: No defect or patent foramen ovale was identified by color Doppler or after injection of agitated saline  RIGHT ATRIUM: Size was normal  No thrombus was identified  MITRAL VALVE: Valve structure was normal  There was normal leaflet separation  There was no echocardiographic evidence of vegetation  DOPPLER: There was mild regurgitation  AORTIC VALVE: The valve was trileaflet  Leaflets exhibited normal thickness, mild calcification, normal cuspal separation, and sclerosis  There was no echocardiographic evidence of vegetation  DOPPLER: There was no regurgitation  TRICUSPID VALVE: The valve structure was normal  There was normal leaflet separation  There was no echocardiographic evidence of vegetation  DOPPLER: There was no significant regurgitation  PULMONIC VALVE: Leaflets exhibited normal thickness, no calcification, and normal cuspal separation  DOPPLER: The transpulmonic velocity was within the normal range  There was mild regurgitation  PERICARDIUM: There was no pericardial effusion  The pericardium was normal in appearance  AORTA: The root exhibited normal size      [address] [facility]redited Ech[facility]    Prepared and electronically signed by    Mayco Roy MD  Signed 12-Oct-2017 16:33:18

## 2018-01-26 ENCOUNTER — TELEPHONE (OUTPATIENT)
Dept: NEUROLOGY | Facility: CLINIC | Age: 53
End: 2018-01-26

## 2018-01-26 ENCOUNTER — TELEPHONE (OUTPATIENT)
Dept: CARDIOLOGY CLINIC | Facility: CLINIC | Age: 53
End: 2018-01-26

## 2018-01-26 NOTE — TELEPHONE ENCOUNTER
christina from cardiology called, pt is to have a cardiac cath  Pt on coumadin  They would like to hold 3 days prior  ok to hold coumadin?   Ext [id number]

## 2018-01-29 ENCOUNTER — LAB (OUTPATIENT)
Dept: LAB | Facility: MEDICAL CENTER | Age: 53
End: 2018-01-29
Payer: COMMERCIAL

## 2018-01-29 DIAGNOSIS — Z79.01 LONG TERM CURRENT USE OF ANTICOAGULANT: ICD-10-CM

## 2018-01-29 DIAGNOSIS — I25.10 CORONARY ARTERY DISEASE INVOLVING NATIVE CORONARY ARTERY OF NATIVE HEART WITHOUT ANGINA PECTORIS: ICD-10-CM

## 2018-01-29 LAB
ALBUMIN SERPL BCP-MCNC: 3.8 G/DL (ref 3.5–5)
ALP SERPL-CCNC: 106 U/L (ref 46–116)
ALT SERPL W P-5'-P-CCNC: 60 U/L (ref 12–78)
ANION GAP SERPL CALCULATED.3IONS-SCNC: 8 MMOL/L (ref 4–13)
AST SERPL W P-5'-P-CCNC: 25 U/L (ref 5–45)
BILIRUB SERPL-MCNC: 0.47 MG/DL (ref 0.2–1)
BUN SERPL-MCNC: 18 MG/DL (ref 5–25)
CALCIUM SERPL-MCNC: 8.6 MG/DL (ref 8.3–10.1)
CHLORIDE SERPL-SCNC: 108 MMOL/L (ref 100–108)
CHOLEST SERPL-MCNC: 118 MG/DL (ref 50–200)
CO2 SERPL-SCNC: 26 MMOL/L (ref 21–32)
CREAT SERPL-MCNC: 0.81 MG/DL (ref 0.6–1.3)
ERYTHROCYTE [DISTWIDTH] IN BLOOD BY AUTOMATED COUNT: 13.9 % (ref 11.6–15.1)
GFR SERPL CREATININE-BSD FRML MDRD: 104 ML/MIN/1.73SQ M
GLUCOSE P FAST SERPL-MCNC: 114 MG/DL (ref 65–99)
HCT VFR BLD AUTO: 45.9 % (ref 36.5–49.3)
HDLC SERPL-MCNC: 38 MG/DL (ref 40–60)
HGB BLD-MCNC: 15.3 G/DL (ref 12–17)
INR PPP: 2.48 (ref 0.86–1.16)
LDLC SERPL CALC-MCNC: 51 MG/DL (ref 0–100)
MCH RBC QN AUTO: 28.8 PG (ref 26.8–34.3)
MCHC RBC AUTO-ENTMCNC: 33.3 G/DL (ref 31.4–37.4)
MCV RBC AUTO: 86 FL (ref 82–98)
PLATELET # BLD AUTO: 184 THOUSANDS/UL (ref 149–390)
PMV BLD AUTO: 12.3 FL (ref 8.9–12.7)
POTASSIUM SERPL-SCNC: 4.2 MMOL/L (ref 3.5–5.3)
PROT SERPL-MCNC: 7.3 G/DL (ref 6.4–8.2)
PROTHROMBIN TIME: 27.1 SECONDS (ref 12.1–14.4)
RBC # BLD AUTO: 5.31 MILLION/UL (ref 3.88–5.62)
SODIUM SERPL-SCNC: 142 MMOL/L (ref 136–145)
TRIGL SERPL-MCNC: 145 MG/DL
WBC # BLD AUTO: 9.6 THOUSAND/UL (ref 4.31–10.16)

## 2018-01-29 PROCEDURE — 36415 COLL VENOUS BLD VENIPUNCTURE: CPT | Performed by: INTERNAL MEDICINE

## 2018-01-29 PROCEDURE — 85610 PROTHROMBIN TIME: CPT

## 2018-01-29 PROCEDURE — 80053 COMPREHEN METABOLIC PANEL: CPT | Performed by: INTERNAL MEDICINE

## 2018-01-29 PROCEDURE — 85027 COMPLETE CBC AUTOMATED: CPT

## 2018-01-29 PROCEDURE — 80061 LIPID PANEL: CPT

## 2018-01-30 ENCOUNTER — TELEPHONE (OUTPATIENT)
Dept: FAMILY MEDICINE CLINIC | Facility: MEDICAL CENTER | Age: 53
End: 2018-01-30

## 2018-01-30 NOTE — TELEPHONE ENCOUNTER
Hi,    So Ideally, no, but patient should be bridged with lovenox or heparin until before the cardiac cath, high risk of having strokes especially if just stopped  So not ok with holding it for 3 days prior to the cath  I am ok with bridging with either heparin or lovenox       Thanks,  Daniela

## 2018-01-30 NOTE — TELEPHONE ENCOUNTER
INR reviewed and is in range  Continue current dosing schedule for Coumadin  Repeat INR in one month

## 2018-02-02 NOTE — TELEPHONE ENCOUNTER
Brianna Roman will schedule cardiaccath first w/ pt and then she will call us back with date to coordinate bridge

## 2018-02-05 ENCOUNTER — OFFICE VISIT (OUTPATIENT)
Dept: NEUROLOGY | Facility: CLINIC | Age: 53
End: 2018-02-05
Payer: COMMERCIAL

## 2018-02-05 VITALS
DIASTOLIC BLOOD PRESSURE: 72 MMHG | BODY MASS INDEX: 28.44 KG/M2 | WEIGHT: 210 LBS | HEIGHT: 72 IN | SYSTOLIC BLOOD PRESSURE: 138 MMHG | HEART RATE: 84 BPM

## 2018-02-05 DIAGNOSIS — I63.412 CEREBROVASCULAR ACCIDENT (CVA) DUE TO EMBOLISM OF LEFT MIDDLE CEREBRAL ARTERY (HCC): Primary | ICD-10-CM

## 2018-02-05 DIAGNOSIS — E78.5 DYSLIPIDEMIA: ICD-10-CM

## 2018-02-05 DIAGNOSIS — I10 HYPERTENSION, UNSPECIFIED TYPE: ICD-10-CM

## 2018-02-05 PROCEDURE — 99214 OFFICE O/P EST MOD 30 MIN: CPT | Performed by: PSYCHIATRY & NEUROLOGY

## 2018-02-05 RX ORDER — WARFARIN SODIUM 6 MG/1
TABLET ORAL
COMMUNITY
Start: 2018-01-18 | End: 2018-07-17 | Stop reason: SDUPTHER

## 2018-02-05 RX ORDER — WARFARIN SODIUM 1 MG/1
TABLET ORAL
COMMUNITY
Start: 2018-01-19 | End: 2019-03-28 | Stop reason: SDUPTHER

## 2018-02-05 NOTE — PROGRESS NOTES
Patient ID: Elvi Avilez is a 50 y o  male  Assessment/Plan:  30-year-old  male with history of cardiomyopathy with EF of 20%, here for follow up of left MCA stroke  Cardiology recommended coumadin for stroke prevention  PLAN:   Continue coumadin and aspirin for stroke prevention, if cardiac cath to be done, recommend that he be on lovenox/heparin bridge  Continue Atorvastatin 80mg PO QHS for hyperlipidemia  Last lipid profile showed Cholesterol 118, triglycerides 145, HDL 38, LDL is 51  Continue BP control, goal < 130/80  Continue to monitor DM and appropriate Euglycemic control  Follow up with me in 6 months       Advised the patient/guardian if they have any stroke like symptoms such as facial droop, weakness on either side, speech trouble, numbness, balance issues, any vision changes, or any new headache, to call 9-1-1 immediately or to proceed to the nearest ER immediately  Problem List Items Addressed This Visit     Cerebrovascular accident (CVA) due to embolism of middle cerebral artery (HCC) - Primary    Hypertension    Dyslipidemia             Subjective:    HPI    This is a 50year old  male who has a history of left MCA stroke  No residual deficits from the stroke, only had speech difficulty at that time but now completely resolved  No stroke like symptoms  He had an EF of 20% so patient is on coumadin for stroke prevention  He is not scheduled cardiac catherization  No new stroke symptoms  He is driving now  Done with occupational therapy, physical and speech therapy  INRs have been ranging between 2-3  He is compliant with the medications  He is consistent with diet  The following portions of the patient's history were reviewed and updated as appropriate:   He  has a past medical history of Cardiac disease and CABG  He  does not have any pertinent problems on file    He  has a past surgical history that includes Coronary artery bypass graft and Appendectomy  His family history includes Hypertension in his father and mother  He  reports that he has quit smoking  He has quit using smokeless tobacco  He reports that he drinks alcohol  He reports that he does not use drugs  Current Outpatient Prescriptions   Medication Sig Dispense Refill    aspirin 81 mg chewable tablet Chew 1 tablet daily 30 tablet 0    atorvastatin (LIPITOR) 80 mg tablet Take 1 tablet by mouth every evening 30 tablet 0    JANTOVEN 1 MG tablet       JANTOVEN 6 MG tablet       metoprolol tartrate (LOPRESSOR) 25 mg tablet Take 0 5 tablets by mouth every 12 (twelve) hours 60 tablet 0    warfarin (COUMADIN) 3 mg tablet Take 1 tablet by mouth daily 3 tablet 0     No current facility-administered medications for this visit  Current Outpatient Prescriptions on File Prior to Visit   Medication Sig    aspirin 81 mg chewable tablet Chew 1 tablet daily    atorvastatin (LIPITOR) 80 mg tablet Take 1 tablet by mouth every evening    metoprolol tartrate (LOPRESSOR) 25 mg tablet Take 0 5 tablets by mouth every 12 (twelve) hours    warfarin (COUMADIN) 3 mg tablet Take 1 tablet by mouth daily     No current facility-administered medications on file prior to visit  He is allergic to penicillins            Objective:    Blood pressure 138/72, pulse 84, height 6' (1 829 m), weight 95 3 kg (210 lb)  Physical Exam  General: alert, awake, oriented  Skin: no lesions  Chest: non-labored breathing    Neurological Exam  General: alert, awake, follows commands  Speech: fluent, no evidence of dysarthria noted  Cranial nerves: PERRL, EOMI, no facial droop noted, facial sensation intact  Motor 5/5 throughout  Gait - normal      ROS:    Review of Systems   Constitutional: Negative  HENT: Negative  Eyes: Negative  Respiratory: Negative  Cardiovascular: Negative  Gastrointestinal: Negative  Endocrine: Negative  Genitourinary: Negative  Musculoskeletal: Negative      Skin: Negative  Allergic/Immunologic: Negative  Neurological: Negative  Hematological: Negative  Psychiatric/Behavioral: Negative

## 2018-02-16 ENCOUNTER — TELEPHONE (OUTPATIENT)
Dept: CARDIOLOGY CLINIC | Facility: CLINIC | Age: 53
End: 2018-02-16

## 2018-02-16 NOTE — TELEPHONE ENCOUNTER
Pt's wife Beni Rick, called about a previous cardiac cath she thought pt had last October, where pt remembers being told there was nothing wrong with his heart  I do not see record of a cath in October  Pt and wife asking if he really needs cardiac cath  Please advise

## 2018-02-21 ENCOUNTER — OFFICE VISIT (OUTPATIENT)
Dept: FAMILY MEDICINE CLINIC | Facility: MEDICAL CENTER | Age: 53
End: 2018-02-21
Payer: COMMERCIAL

## 2018-02-21 VITALS
WEIGHT: 206.25 LBS | BODY MASS INDEX: 27.93 KG/M2 | HEIGHT: 72 IN | DIASTOLIC BLOOD PRESSURE: 70 MMHG | HEART RATE: 70 BPM | SYSTOLIC BLOOD PRESSURE: 134 MMHG | RESPIRATION RATE: 14 BRPM

## 2018-02-21 DIAGNOSIS — R73.09 ELEVATED GLUCOSE: ICD-10-CM

## 2018-02-21 DIAGNOSIS — E78.5 DYSLIPIDEMIA: ICD-10-CM

## 2018-02-21 DIAGNOSIS — I63.412 CEREBROVASCULAR ACCIDENT (CVA) DUE TO EMBOLISM OF LEFT MIDDLE CEREBRAL ARTERY (HCC): ICD-10-CM

## 2018-02-21 DIAGNOSIS — I10 HYPERTENSION, UNSPECIFIED TYPE: Primary | ICD-10-CM

## 2018-02-21 PROBLEM — R73.01 IMPAIRED FASTING GLUCOSE: Status: ACTIVE | Noted: 2018-02-21

## 2018-02-21 LAB — SL AMB POCT HEMOGLOBIN AIC: 5.9

## 2018-02-21 PROCEDURE — 99214 OFFICE O/P EST MOD 30 MIN: CPT | Performed by: FAMILY MEDICINE

## 2018-02-21 PROCEDURE — 83036 HEMOGLOBIN GLYCOSYLATED A1C: CPT | Performed by: FAMILY MEDICINE

## 2018-02-21 PROCEDURE — 1036F TOBACCO NON-USER: CPT | Performed by: FAMILY MEDICINE

## 2018-02-21 NOTE — PROGRESS NOTES
Assessment/Plan:    No problem-specific Assessment & Plan notes found for this encounter  Diagnoses and all orders for this visit:    Hypertension, unspecified type    Well controlled  Continue medication  Dyslipidemia  Well controlled on previous labs  Continue current dose of statin  Cerebrovascular accident (CVA) due to embolism of left middle cerebral artery (HCC)  INRs have been stable  Continue current dose of Coumadin and continue monthly INR checks for now  Elevated glucose  -     POCT hemoglobin A1c  Fasting glucose elevated on recent labs with a value of 114  In office A1c was 5 9% today  Patient has impaired fasting glucose  Likely diet related  I discussed the patient avoiding high carbohydrate foods in his diet and a few examples were recommended  Follow-up in six months or sooner if needed  Subjective:      Patient ID: Kaylie Gaspar is a 50 y o  male  Patient presents follow-up  Has high blood pressure  Currently on metoprolol 12 5 mg twice daily  Tolerating medication well  Has high cholesterol  Currently on atorvastatin 80 mg daily  Tolerating medication well without any myalgias  Has had a stroke and is on anticoagulation  This will be lifelong  Currently takes 7 mg of Coumadin on Tuesdays and 6 mg the rest of the week  INRs have been stable and he is now on monthly INR checks  No abnormal bleeding  Tolerating medication well otherwise  Did had labs recently that were ordered by his cardiologist         The following portions of the patient's history were reviewed and updated as appropriate: allergies, current medications, past family history, past medical history, past social history, past surgical history and problem list     Review of Systems   Constitutional: Negative for fever  Respiratory: Negative for shortness of breath  Cardiovascular: Negative for chest pain           Objective:      /70   Pulse 70   Resp 14 Ht 6' (1 829 m)   Wt 93 6 kg (206 lb 4 oz)   BMI 27 97 kg/m²          Physical Exam   Constitutional: He appears well-developed and well-nourished  Cardiovascular: Normal rate, regular rhythm and normal heart sounds      Pulmonary/Chest: Effort normal and breath sounds normal

## 2018-02-27 ENCOUNTER — APPOINTMENT (OUTPATIENT)
Dept: LAB | Facility: MEDICAL CENTER | Age: 53
End: 2018-02-27
Payer: COMMERCIAL

## 2018-02-27 ENCOUNTER — TELEPHONE (OUTPATIENT)
Dept: FAMILY MEDICINE CLINIC | Facility: MEDICAL CENTER | Age: 53
End: 2018-02-27

## 2018-02-27 DIAGNOSIS — I10 HYPERTENSION: ICD-10-CM

## 2018-02-27 DIAGNOSIS — I63.9 CEREBRAL INFARCTION (HCC): ICD-10-CM

## 2018-02-27 LAB
INR PPP: 2.49 (ref 0.86–1.16)
PROTHROMBIN TIME: 27.2 SECONDS (ref 12.1–14.4)

## 2018-02-27 PROCEDURE — 36415 COLL VENOUS BLD VENIPUNCTURE: CPT

## 2018-02-27 PROCEDURE — 85610 PROTHROMBIN TIME: CPT

## 2018-02-27 NOTE — TELEPHONE ENCOUNTER
----- Message from Yosef Horn DO sent at 2/27/2018  5:41 PM EST -----  Continue same dose of Coumadin  Repeat INR in one month

## 2018-02-28 ENCOUNTER — ANTICOAG VISIT (OUTPATIENT)
Dept: FAMILY MEDICINE CLINIC | Facility: MEDICAL CENTER | Age: 53
End: 2018-02-28

## 2018-03-30 ENCOUNTER — TELEPHONE (OUTPATIENT)
Dept: FAMILY MEDICINE CLINIC | Facility: MEDICAL CENTER | Age: 53
End: 2018-03-30

## 2018-03-30 ENCOUNTER — ANTICOAG VISIT (OUTPATIENT)
Dept: FAMILY MEDICINE CLINIC | Facility: MEDICAL CENTER | Age: 53
End: 2018-03-30

## 2018-03-30 ENCOUNTER — APPOINTMENT (OUTPATIENT)
Dept: LAB | Facility: MEDICAL CENTER | Age: 53
End: 2018-03-30
Payer: COMMERCIAL

## 2018-03-30 DIAGNOSIS — I63.9 CEREBRAL INFARCTION (HCC): ICD-10-CM

## 2018-03-30 LAB
INR PPP: 2.75 (ref 0.86–1.16)
PROTHROMBIN TIME: 29.5 SECONDS (ref 12.1–14.4)

## 2018-03-30 PROCEDURE — 85610 PROTHROMBIN TIME: CPT

## 2018-03-30 PROCEDURE — 36415 COLL VENOUS BLD VENIPUNCTURE: CPT

## 2018-03-30 NOTE — TELEPHONE ENCOUNTER
Arianna Bain is aware and she said she will check with the insurance to see if they can have the home machine

## 2018-03-30 NOTE — TELEPHONE ENCOUNTER
----- Message from Celi Varela DO sent at 3/30/2018  1:49 PM EDT -----  INR in range  Continue same dose of Coumadin  Repeat INR in one month  Please ask patient if he wants to see if he qualifies for home Coumadin testing  It will require weekly testing however we will only get the results once monthly  The weekly testing is normally required by insurance in order to get the machine covered

## 2018-04-28 ENCOUNTER — APPOINTMENT (OUTPATIENT)
Dept: LAB | Facility: MEDICAL CENTER | Age: 53
End: 2018-04-28
Payer: COMMERCIAL

## 2018-04-28 ENCOUNTER — TRANSCRIBE ORDERS (OUTPATIENT)
Dept: ADMINISTRATIVE | Facility: HOSPITAL | Age: 53
End: 2018-04-28

## 2018-04-28 DIAGNOSIS — I63.9 IMPENDING CEREBROVASCULAR ACCIDENT (HCC): Primary | ICD-10-CM

## 2018-04-28 DIAGNOSIS — I63.9 IMPENDING CEREBROVASCULAR ACCIDENT (HCC): ICD-10-CM

## 2018-04-28 LAB
INR PPP: 2.02 (ref 0.86–1.16)
PROTHROMBIN TIME: 23.1 SECONDS (ref 12.1–14.4)

## 2018-04-28 PROCEDURE — 85610 PROTHROMBIN TIME: CPT

## 2018-04-28 PROCEDURE — 36415 COLL VENOUS BLD VENIPUNCTURE: CPT

## 2018-04-30 ENCOUNTER — TELEPHONE (OUTPATIENT)
Dept: FAMILY MEDICINE CLINIC | Facility: MEDICAL CENTER | Age: 53
End: 2018-04-30

## 2018-04-30 ENCOUNTER — ANTICOAG VISIT (OUTPATIENT)
Dept: FAMILY MEDICINE CLINIC | Facility: MEDICAL CENTER | Age: 53
End: 2018-04-30

## 2018-04-30 NOTE — TELEPHONE ENCOUNTER
----- Message from Naima Staff, DO sent at 4/30/2018 10:11 AM EDT -----  INR in range  Repeat INR in one month  Continue current dosing of Coumadin

## 2018-05-10 DIAGNOSIS — E78.5 DYSLIPIDEMIA, GOAL LDL BELOW 70: Primary | ICD-10-CM

## 2018-05-10 RX ORDER — ATORVASTATIN CALCIUM 80 MG/1
TABLET, FILM COATED ORAL
Qty: 30 TABLET | Refills: 5 | Status: SHIPPED | OUTPATIENT
Start: 2018-05-10 | End: 2018-08-28

## 2018-05-26 ENCOUNTER — APPOINTMENT (OUTPATIENT)
Dept: LAB | Facility: MEDICAL CENTER | Age: 53
End: 2018-05-26
Payer: COMMERCIAL

## 2018-05-29 ENCOUNTER — TELEPHONE (OUTPATIENT)
Dept: FAMILY MEDICINE CLINIC | Facility: MEDICAL CENTER | Age: 53
End: 2018-05-29

## 2018-05-29 ENCOUNTER — ANTICOAG VISIT (OUTPATIENT)
Dept: FAMILY MEDICINE CLINIC | Facility: MEDICAL CENTER | Age: 53
End: 2018-05-29

## 2018-05-29 DIAGNOSIS — I10 ESSENTIAL HYPERTENSION: Primary | ICD-10-CM

## 2018-05-29 NOTE — TELEPHONE ENCOUNTER
----- Message from Ryan Velazquez DO sent at 5/29/2018  8:23 AM EDT -----  INR in range  Continue current dosing schedule for Coumadin  Repeat INR in one month

## 2018-06-30 ENCOUNTER — LAB (OUTPATIENT)
Dept: LAB | Facility: MEDICAL CENTER | Age: 53
End: 2018-06-30
Payer: COMMERCIAL

## 2018-07-02 ENCOUNTER — TELEPHONE (OUTPATIENT)
Dept: FAMILY MEDICINE CLINIC | Facility: MEDICAL CENTER | Age: 53
End: 2018-07-02

## 2018-07-02 NOTE — TELEPHONE ENCOUNTER
----- Message from Livia Novak DO sent at 7/2/2018  8:32 AM EDT -----  INR in range  Continue current dosing for Coumadin  Repeat INR in one month

## 2018-07-17 DIAGNOSIS — I63.9 CEREBROVASCULAR ACCIDENT (CVA), UNSPECIFIED MECHANISM (HCC): Primary | ICD-10-CM

## 2018-07-17 RX ORDER — WARFARIN SODIUM 6 MG/1
TABLET ORAL
Qty: 90 TABLET | Refills: 1 | Status: SHIPPED | OUTPATIENT
Start: 2018-07-17 | End: 2019-01-14 | Stop reason: SDUPTHER

## 2018-07-28 ENCOUNTER — APPOINTMENT (OUTPATIENT)
Dept: LAB | Facility: MEDICAL CENTER | Age: 53
End: 2018-07-28
Payer: COMMERCIAL

## 2018-07-30 ENCOUNTER — TELEPHONE (OUTPATIENT)
Dept: FAMILY MEDICINE CLINIC | Facility: MEDICAL CENTER | Age: 53
End: 2018-07-30

## 2018-07-30 ENCOUNTER — ANTICOAG VISIT (OUTPATIENT)
Dept: FAMILY MEDICINE CLINIC | Facility: MEDICAL CENTER | Age: 53
End: 2018-07-30

## 2018-07-30 NOTE — TELEPHONE ENCOUNTER
----- Message from Marnie Joy DO sent at 7/29/2018  9:59 PM EDT -----  INR slightly out of range  Cont same dose of coumadin and repeat INR in 1wk

## 2018-08-04 ENCOUNTER — APPOINTMENT (OUTPATIENT)
Dept: LAB | Facility: MEDICAL CENTER | Age: 53
End: 2018-08-04
Payer: COMMERCIAL

## 2018-08-04 ENCOUNTER — TRANSCRIBE ORDERS (OUTPATIENT)
Dept: ADMINISTRATIVE | Facility: HOSPITAL | Age: 53
End: 2018-08-04

## 2018-08-04 DIAGNOSIS — I63.9 IMPENDING CEREBROVASCULAR ACCIDENT (HCC): ICD-10-CM

## 2018-08-04 LAB
INR PPP: 1.81 (ref 0.86–1.17)
PROTHROMBIN TIME: 21.1 SECONDS (ref 11.8–14.2)

## 2018-08-04 PROCEDURE — 85610 PROTHROMBIN TIME: CPT

## 2018-08-04 PROCEDURE — 36415 COLL VENOUS BLD VENIPUNCTURE: CPT

## 2018-08-06 ENCOUNTER — TELEPHONE (OUTPATIENT)
Dept: FAMILY MEDICINE CLINIC | Facility: MEDICAL CENTER | Age: 53
End: 2018-08-06

## 2018-08-06 NOTE — TELEPHONE ENCOUNTER
----- Message from Missy Cruz DO sent at 8/6/2018  8:13 AM EDT -----  INR is not in range  Patient is currently on Coumadin 7 mg on Tuesdays and 6 mg the rest of the week  Have him go does 7 mg on Tuesdays and Thursdays and 6 mg the rest of the week  This will be a 1 mg weekly increase  Repeat INR in one week

## 2018-08-07 ENCOUNTER — TELEPHONE (OUTPATIENT)
Dept: FAMILY MEDICINE CLINIC | Facility: MEDICAL CENTER | Age: 53
End: 2018-08-07

## 2018-08-07 ENCOUNTER — ANTICOAG VISIT (OUTPATIENT)
Dept: FAMILY MEDICINE CLINIC | Facility: MEDICAL CENTER | Age: 53
End: 2018-08-07

## 2018-08-07 NOTE — TELEPHONE ENCOUNTER
Patients wife aware, he cannot get to the lab until Saturdays due to work so he will test on the 18th so they can tell if the change in dosage makes an improvement   DANIELLE

## 2018-08-18 ENCOUNTER — APPOINTMENT (OUTPATIENT)
Dept: LAB | Facility: MEDICAL CENTER | Age: 53
End: 2018-08-18
Payer: COMMERCIAL

## 2018-08-18 ENCOUNTER — TRANSCRIBE ORDERS (OUTPATIENT)
Dept: ADMINISTRATIVE | Facility: HOSPITAL | Age: 53
End: 2018-08-18

## 2018-08-18 DIAGNOSIS — I63.9 IMPENDING CEREBROVASCULAR ACCIDENT (HCC): ICD-10-CM

## 2018-08-18 LAB
INR PPP: 2.21 (ref 0.86–1.17)
PROTHROMBIN TIME: 24.6 SECONDS (ref 11.8–14.2)

## 2018-08-18 PROCEDURE — 36415 COLL VENOUS BLD VENIPUNCTURE: CPT

## 2018-08-18 PROCEDURE — 85610 PROTHROMBIN TIME: CPT

## 2018-08-20 ENCOUNTER — ANTICOAG VISIT (OUTPATIENT)
Dept: FAMILY MEDICINE CLINIC | Facility: MEDICAL CENTER | Age: 53
End: 2018-08-20

## 2018-08-28 ENCOUNTER — OFFICE VISIT (OUTPATIENT)
Dept: FAMILY MEDICINE CLINIC | Facility: MEDICAL CENTER | Age: 53
End: 2018-08-28
Payer: COMMERCIAL

## 2018-08-28 VITALS
DIASTOLIC BLOOD PRESSURE: 76 MMHG | HEART RATE: 64 BPM | SYSTOLIC BLOOD PRESSURE: 140 MMHG | RESPIRATION RATE: 16 BRPM | WEIGHT: 203 LBS | BODY MASS INDEX: 27.53 KG/M2

## 2018-08-28 DIAGNOSIS — R20.0 BILATERAL ARM NUMBNESS AND TINGLING WHILE SLEEPING: ICD-10-CM

## 2018-08-28 DIAGNOSIS — I10 HYPERTENSION, UNSPECIFIED TYPE: ICD-10-CM

## 2018-08-28 DIAGNOSIS — I63.412 CEREBROVASCULAR ACCIDENT (CVA) DUE TO EMBOLISM OF LEFT MIDDLE CEREBRAL ARTERY (HCC): Primary | ICD-10-CM

## 2018-08-28 DIAGNOSIS — E78.5 DYSLIPIDEMIA: ICD-10-CM

## 2018-08-28 DIAGNOSIS — R20.2 BILATERAL ARM NUMBNESS AND TINGLING WHILE SLEEPING: ICD-10-CM

## 2018-08-28 PROCEDURE — 99214 OFFICE O/P EST MOD 30 MIN: CPT | Performed by: FAMILY MEDICINE

## 2018-08-28 RX ORDER — CARVEDILOL PHOSPHATE 10 MG/1
10 CAPSULE, EXTENDED RELEASE ORAL DAILY
Qty: 90 CAPSULE | Refills: 0 | Status: SHIPPED | OUTPATIENT
Start: 2018-08-28 | End: 2018-08-28 | Stop reason: SDUPTHER

## 2018-08-28 RX ORDER — ROSUVASTATIN CALCIUM 20 MG/1
20 TABLET, COATED ORAL DAILY
Qty: 90 TABLET | Refills: 0 | Status: SHIPPED | OUTPATIENT
Start: 2018-08-28 | End: 2018-11-25 | Stop reason: SDUPTHER

## 2018-08-28 RX ORDER — CARVEDILOL PHOSPHATE 10 MG/1
10 CAPSULE, EXTENDED RELEASE ORAL DAILY
Qty: 90 CAPSULE | Refills: 0 | Status: SHIPPED | OUTPATIENT
Start: 2018-08-28 | End: 2018-10-16 | Stop reason: SDUPTHER

## 2018-08-28 RX ORDER — ROSUVASTATIN CALCIUM 20 MG/1
20 TABLET, COATED ORAL DAILY
Qty: 90 TABLET | Refills: 0 | Status: SHIPPED | OUTPATIENT
Start: 2018-08-28 | End: 2018-08-28 | Stop reason: SDUPTHER

## 2018-08-28 NOTE — PROGRESS NOTES
Assessment/Plan:    No problem-specific Assessment & Plan notes found for this encounter  Diagnoses and all orders for this visit:    Cerebrovascular accident (CVA) due to embolism of left middle cerebral artery (HCC)  Continue Coumadin  Continue regular INR checks  Symptoms appear to be stable  Hypertension, unspecified type  -     Discontinue: carvedilol (COREG CR) 10 MG 24 hr capsule; Take 1 capsule (10 mg total) by mouth daily  -     carvedilol (COREG CR) 10 MG 24 hr capsule; Take 1 capsule (10 mg total) by mouth daily  Not well controlled however patient does not want to take metoprolol any longer as he believes is causing dizziness  Will have patient switch to carvedilol  Dyslipidemia  -     Discontinue: rosuvastatin (CRESTOR) 20 MG tablet; Take 1 tablet (20 mg total) by mouth daily  -     Comprehensive metabolic panel; Future  -     Lipid Panel with Direct LDL reflex; Future  -     rosuvastatin (CRESTOR) 20 MG tablet; Take 1 tablet (20 mg total) by mouth daily  Patient is on atorvastatin but believes it is causing side effects  The pills also too big  Will have patient switch to Crestor 20 mg daily  Hopefully his symptoms improve with medication switch  Check labs in six weeks  Bilateral arm numbness and tingling while sleeping  -     EMG 2 Limb Upper Extremity; Future  Unclear if his arm symptoms are secondary to his stroke, carpal tunnel or his medications  He does have some weakness in the right arm when compared to the left arm with strength testing but this is likely due to his stroke as did occur on the left  Change in medication as above  If symptoms persist after two weeks I recommended patient schedule the appointment for the nerve conduction testing  May need to see Neurology based on symptoms and test results  I encouraged patient to get a colonoscopy to screen for colon cancer  Patient declined at this time  He wishes to do this after the holidays      Follow-up in six weeks or sooner if needed  Subjective:      Patient ID: Georgina Norris is a 50 y o  male  Patient presents for follow-up  Patient does have a complaint today of bilateral arm numbness and dizziness  Patient states the arm numbness occurs mostly in the evening  Patient states the dizziness occurs at random times during the day  Patient believes both of his symptoms are secondary to the medications he is taking  Patient states he took similar medications years ago and had the dizziness and all of his symptoms resolved when he stop the medications on his own  Now that he is back on the medications his dizziness has returned  He is getting frustrated and states he will stop the medication on his own if symptoms persist   As for the arm numbness he does state he was diagnosed with carpal tunnel syndrome in the past   Those symptoms were different than what he is experiencing now  Patient believes the medication is causing the numbness as well  He has history of a stroke  He is currently on Coumadin and is also taking a statin  Tolerating the Coumadin well without any bleeding  Patient believes the statin is causing the symptoms above  He is also on atorvastatin 80 mg daily and states that is a horse pill and no longer wants to take such a large medication  He has high blood pressure  He is currently on metoprolol 12 5 mg twice daily  He believes this medication is adding to his dizziness  He would like to change it and if that is not possible he will stop taking the medication altogether  He has high cholesterol  He takes a statin for this as well  As stated above he wants to change the statin as he feels is causing side effects and the pill is just too large              The following portions of the patient's history were reviewed and updated as appropriate:   He   Patient Active Problem List    Diagnosis Date Noted    Impaired fasting glucose 02/21/2018    Long term current use of anticoagulant therapy 01/25/2018    Ischemic cardiomyopathy 10/17/2017    Dyslipidemia 10/17/2017    Hypertension 10/10/2017    CAD (coronary artery disease) 10/10/2017    Cerebrovascular accident (CVA) due to embolism of middle cerebral artery (HCC) 10/09/2017     Current Outpatient Prescriptions   Medication Sig Dispense Refill    aspirin 81 mg chewable tablet Chew 1 tablet daily 30 tablet 0    JANTOVEN 1 MG tablet       JANTOVEN 6 MG tablet TAKE ONE TABLET BY MOUTH EVERY DAY OR AS DIRECTED  90 tablet 1    carvedilol (COREG CR) 10 MG 24 hr capsule Take 1 capsule (10 mg total) by mouth daily 90 capsule 0    rosuvastatin (CRESTOR) 20 MG tablet Take 1 tablet (20 mg total) by mouth daily 90 tablet 0     No current facility-administered medications for this visit  He is allergic to penicillins       Review of Systems   Constitutional: Negative for fever  Respiratory: Negative for shortness of breath  Cardiovascular: Negative for chest pain  Objective:      /76 (BP Location: Left arm, Patient Position: Sitting, Cuff Size: Adult)   Pulse 64   Resp 16   Wt 92 1 kg (203 lb)   BMI 27 53 kg/m²          Physical Exam   Constitutional: He is oriented to person, place, and time  Vital signs are normal  He appears well-developed and well-nourished  HENT:   Head: Normocephalic and atraumatic  Right Ear: Tympanic membrane, external ear and ear canal normal    Left Ear: Tympanic membrane, external ear and ear canal normal    Nose: Nose normal    Mouth/Throat: Uvula is midline, oropharynx is clear and moist and mucous membranes are normal    Eyes: Conjunctivae, EOM and lids are normal  Pupils are equal, round, and reactive to light  Neck: Trachea normal  Neck supple  No thyromegaly present  Cardiovascular: Normal rate, regular rhythm, S1 normal and S2 normal     No murmur heard    Pulmonary/Chest: Effort normal and breath sounds normal  Lymphadenopathy:     He has no cervical adenopathy  Neurological: He is alert and oriented to person, place, and time  No cranial nerve deficit  Upper extremity strength testing does show mild weakness of the right upper extremity when compared to the left  Patient states he is right hand dominant  Skin: Skin is warm, dry and intact  Psychiatric: He has a normal mood and affect   His speech is normal and behavior is normal  Thought content normal

## 2018-08-31 ENCOUNTER — TELEPHONE (OUTPATIENT)
Dept: FAMILY MEDICINE CLINIC | Facility: MEDICAL CENTER | Age: 53
End: 2018-08-31

## 2018-08-31 NOTE — TELEPHONE ENCOUNTER
Patient's wife Doron James called our office stating she had the patient start his old medication Metoprolol this morning that he takes twice a day  Doron James wants to know how to start the patient on the new medication Carvedilol (24 hr release)  Call back # is [phone]  Routed call to [facility] to review and advise

## 2018-09-01 ENCOUNTER — TRANSCRIBE ORDERS (OUTPATIENT)
Dept: ADMINISTRATIVE | Facility: HOSPITAL | Age: 53
End: 2018-09-01

## 2018-09-01 ENCOUNTER — APPOINTMENT (OUTPATIENT)
Dept: LAB | Facility: MEDICAL CENTER | Age: 53
End: 2018-09-01
Payer: COMMERCIAL

## 2018-09-01 DIAGNOSIS — I63.9 IMPENDING CEREBROVASCULAR ACCIDENT (HCC): ICD-10-CM

## 2018-09-01 DIAGNOSIS — I63.9 IMPENDING CEREBROVASCULAR ACCIDENT (HCC): Primary | ICD-10-CM

## 2018-09-01 LAB
INR PPP: 2.05 (ref 0.86–1.17)
PROTHROMBIN TIME: 23.2 SECONDS (ref 11.8–14.2)

## 2018-09-01 PROCEDURE — 85610 PROTHROMBIN TIME: CPT

## 2018-09-01 PROCEDURE — 36415 COLL VENOUS BLD VENIPUNCTURE: CPT

## 2018-09-04 ENCOUNTER — ANTICOAG VISIT (OUTPATIENT)
Dept: FAMILY MEDICINE CLINIC | Facility: MEDICAL CENTER | Age: 53
End: 2018-09-04

## 2018-09-29 ENCOUNTER — APPOINTMENT (OUTPATIENT)
Dept: LAB | Facility: MEDICAL CENTER | Age: 53
End: 2018-09-29
Payer: COMMERCIAL

## 2018-09-29 DIAGNOSIS — E78.5 DYSLIPIDEMIA: ICD-10-CM

## 2018-09-29 LAB
ALBUMIN SERPL BCP-MCNC: 3.8 G/DL (ref 3.5–5)
ALP SERPL-CCNC: 82 U/L (ref 46–116)
ALT SERPL W P-5'-P-CCNC: 54 U/L (ref 12–78)
ANION GAP SERPL CALCULATED.3IONS-SCNC: 4 MMOL/L (ref 4–13)
AST SERPL W P-5'-P-CCNC: 32 U/L (ref 5–45)
BILIRUB SERPL-MCNC: 0.63 MG/DL (ref 0.2–1)
BUN SERPL-MCNC: 15 MG/DL (ref 5–25)
CALCIUM SERPL-MCNC: 8.7 MG/DL (ref 8.3–10.1)
CHLORIDE SERPL-SCNC: 106 MMOL/L (ref 100–108)
CHOLEST SERPL-MCNC: 126 MG/DL (ref 50–200)
CO2 SERPL-SCNC: 30 MMOL/L (ref 21–32)
CREAT SERPL-MCNC: 0.87 MG/DL (ref 0.6–1.3)
GFR SERPL CREATININE-BSD FRML MDRD: 100 ML/MIN/1.73SQ M
GLUCOSE P FAST SERPL-MCNC: 111 MG/DL (ref 65–99)
HDLC SERPL-MCNC: 39 MG/DL (ref 40–60)
INR PPP: 2.07 (ref 0.86–1.17)
LDLC SERPL CALC-MCNC: 58 MG/DL (ref 0–100)
POTASSIUM SERPL-SCNC: 4.5 MMOL/L (ref 3.5–5.3)
PROT SERPL-MCNC: 7.1 G/DL (ref 6.4–8.2)
PROTHROMBIN TIME: 23.4 SECONDS (ref 11.8–14.2)
SODIUM SERPL-SCNC: 140 MMOL/L (ref 136–145)
TRIGL SERPL-MCNC: 143 MG/DL

## 2018-09-29 PROCEDURE — 36415 COLL VENOUS BLD VENIPUNCTURE: CPT

## 2018-09-29 PROCEDURE — 80053 COMPREHEN METABOLIC PANEL: CPT

## 2018-09-29 PROCEDURE — 85610 PROTHROMBIN TIME: CPT

## 2018-09-29 PROCEDURE — 80061 LIPID PANEL: CPT

## 2018-10-01 ENCOUNTER — TELEPHONE (OUTPATIENT)
Dept: FAMILY MEDICINE CLINIC | Facility: MEDICAL CENTER | Age: 53
End: 2018-10-01

## 2018-10-01 NOTE — TELEPHONE ENCOUNTER
----- Message from Celi Varela DO sent at 10/1/2018  1:56 PM EDT -----  INR in range  Same dosing for coumadin  Repeat in 1mo

## 2018-10-02 ENCOUNTER — TELEPHONE (OUTPATIENT)
Dept: FAMILY MEDICINE CLINIC | Facility: MEDICAL CENTER | Age: 53
End: 2018-10-02

## 2018-10-02 NOTE — TELEPHONE ENCOUNTER
----- Message from Eugenia Peres DO sent at 10/2/2018  1:19 PM EDT -----  Cholesterol well controlled and liver function is stable on patient's new cholesterol-lowering medication  Continue Crestor

## 2018-10-03 ENCOUNTER — ANTICOAG VISIT (OUTPATIENT)
Dept: FAMILY MEDICINE CLINIC | Facility: MEDICAL CENTER | Age: 53
End: 2018-10-03

## 2018-10-16 ENCOUNTER — OFFICE VISIT (OUTPATIENT)
Dept: FAMILY MEDICINE CLINIC | Facility: MEDICAL CENTER | Age: 53
End: 2018-10-16
Payer: COMMERCIAL

## 2018-10-16 VITALS
SYSTOLIC BLOOD PRESSURE: 158 MMHG | RESPIRATION RATE: 16 BRPM | BODY MASS INDEX: 28.21 KG/M2 | DIASTOLIC BLOOD PRESSURE: 90 MMHG | WEIGHT: 208 LBS | HEART RATE: 70 BPM

## 2018-10-16 DIAGNOSIS — E78.5 DYSLIPIDEMIA: ICD-10-CM

## 2018-10-16 DIAGNOSIS — I63.412 CEREBROVASCULAR ACCIDENT (CVA) DUE TO EMBOLISM OF LEFT MIDDLE CEREBRAL ARTERY (HCC): ICD-10-CM

## 2018-10-16 DIAGNOSIS — I10 ESSENTIAL HYPERTENSION: Primary | ICD-10-CM

## 2018-10-16 PROCEDURE — 99213 OFFICE O/P EST LOW 20 MIN: CPT | Performed by: FAMILY MEDICINE

## 2018-10-16 RX ORDER — CARVEDILOL PHOSPHATE 20 MG/1
20 CAPSULE, EXTENDED RELEASE ORAL DAILY
Qty: 90 CAPSULE | Refills: 1 | Status: SHIPPED | OUTPATIENT
Start: 2018-10-16 | End: 2019-04-16 | Stop reason: SDUPTHER

## 2018-10-16 NOTE — PROGRESS NOTES
Assessment/Plan:    No problem-specific Assessment & Plan notes found for this encounter  Diagnoses and all orders for this visit:    Essential hypertension  -     carvedilol (COREG CR) 20 MG 24 hr capsule; Take 1 capsule (20 mg total) by mouth daily  Not well controlled with switch from metoprolol to carvedilol  Will increase his dose of carvedilol from 10 mg daily to 20 mg daily  This should provide better blood pressure control  Patient was encouraged to check his blood pressure at home and call the office if the systolic is consistently over 218 or the diastolic is consistently over 90  Dyslipidemia  Cholesterol is improved on the Crestor and his hepatic function stable however patient believes he is having myalgias secondary to the medication  The upper back pain may also be secondary to his left knee pain which he does not want worked up at this time  He will try a knee brace to see if his back pain improves  If it does not we can try cutting his Crestor in half to see if that works  Cerebrovascular accident (CVA) due to embolism of left middle cerebral artery ([address] )  -     Protime-INR; Standing  -     Protime-INR  Patient needed a new standing order for his INRs per clinical staff  That order was placed  Patient will be due for an INR at the end of the month  Flu vaccine was recommended but patient declined  Follow-up in three months or sooner if needed  Subjective:      Patient ID: Niya Hess is a 48 y o  male  Patient presents for follow-up  He has high blood pressure  He was having side effects from his metoprolol at his last office visit and therefore he was switched to carvedilol 10 mg daily  He is tolerating this medication well  He does not have any dizziness  He does notice that his blood pressure is not well controlled however  Would be agreeable to adjusting the medication dose  He has high cholesterol  He was started on rosuvastatin 20 mg daily  He is tolerating the medication fairly well but noticed that he has some upper back pain between his shoulder blades  He is unsure if this is secondary to the medication or if it is related to his left knee pain that started recently  He does not want to pursue workup of the left knee  He will attempt to wear a brace to see if it helps his knee  The following portions of the patient's history were reviewed and updated as appropriate:   He   Patient Active Problem List    Diagnosis Date Noted    Impaired fasting glucose 02/21/2018    Long term current use of anticoagulant therapy 01/25/2018    Ischemic cardiomyopathy 10/17/2017    Dyslipidemia 10/17/2017    Hypertension 10/10/2017    CAD (coronary artery disease) 10/10/2017    Cerebrovascular accident (CVA) due to embolism of middle cerebral artery ([facility] [address] ) 10/09/2017     Current Outpatient Prescriptions   Medication Sig Dispense Refill    aspirin 81 mg chewable tablet Chew 1 tablet daily 30 tablet 0    carvedilol (COREG CR) 20 MG 24 hr capsule Take 1 capsule (20 mg total) by mouth daily 90 capsule 1    JANTOVEN 1 MG tablet       JANTOVEN 6 MG tablet TAKE ONE TABLET BY MOUTH EVERY DAY OR AS DIRECTED  90 tablet 1    rosuvastatin (CRESTOR) 20 MG tablet Take 1 tablet (20 mg total) by mouth daily 90 tablet 0     No current facility-administered medications for this visit  He is allergic to penicillins       Review of Systems   Constitutional: Negative for fever  Respiratory: Negative for shortness of breath  Cardiovascular: Negative for chest pain  Objective:      /90 (BP Location: Left arm, Patient Position: Sitting, Cuff Size: Adult)   Pulse 70   Resp 16   Wt 94 3 kg (208 lb)   BMI 28 21 kg/m²          Physical Exam   Constitutional: He appears well-developed and well-nourished  Cardiovascular: Normal rate, regular rhythm and normal heart sounds      Pulmonary/Chest: Effort normal and breath sounds normal  Musculoskeletal:        Thoracic back: Normal

## 2018-10-27 ENCOUNTER — APPOINTMENT (OUTPATIENT)
Dept: LAB | Facility: MEDICAL CENTER | Age: 53
End: 2018-10-27
Payer: COMMERCIAL

## 2018-10-27 LAB
INR PPP: 2.97 (ref 0.86–1.17)
PROTHROMBIN TIME: 30.9 SECONDS (ref 11.8–14.2)

## 2018-10-27 PROCEDURE — 36415 COLL VENOUS BLD VENIPUNCTURE: CPT | Performed by: FAMILY MEDICINE

## 2018-10-27 PROCEDURE — 85610 PROTHROMBIN TIME: CPT | Performed by: FAMILY MEDICINE

## 2018-10-29 ENCOUNTER — ANTICOAG VISIT (OUTPATIENT)
Dept: FAMILY MEDICINE CLINIC | Facility: MEDICAL CENTER | Age: 53
End: 2018-10-29

## 2018-11-24 ENCOUNTER — APPOINTMENT (OUTPATIENT)
Dept: LAB | Facility: MEDICAL CENTER | Age: 53
End: 2018-11-24
Payer: COMMERCIAL

## 2018-11-25 DIAGNOSIS — E78.5 DYSLIPIDEMIA: ICD-10-CM

## 2018-11-26 ENCOUNTER — ANTICOAG VISIT (OUTPATIENT)
Dept: FAMILY MEDICINE CLINIC | Facility: MEDICAL CENTER | Age: 53
End: 2018-11-26

## 2018-11-27 ENCOUNTER — TELEPHONE (OUTPATIENT)
Dept: FAMILY MEDICINE CLINIC | Facility: MEDICAL CENTER | Age: 53
End: 2018-11-27

## 2018-11-27 RX ORDER — ROSUVASTATIN CALCIUM 20 MG/1
TABLET, COATED ORAL
Qty: 90 TABLET | Refills: 0 | Status: SHIPPED | OUTPATIENT
Start: 2018-11-27 | End: 2019-01-22

## 2018-12-29 ENCOUNTER — APPOINTMENT (OUTPATIENT)
Dept: LAB | Facility: MEDICAL CENTER | Age: 53
End: 2018-12-29
Payer: COMMERCIAL

## 2018-12-31 ENCOUNTER — ANTICOAG VISIT (OUTPATIENT)
Dept: FAMILY MEDICINE CLINIC | Facility: MEDICAL CENTER | Age: 53
End: 2018-12-31

## 2019-01-14 DIAGNOSIS — I63.9 CEREBROVASCULAR ACCIDENT (CVA), UNSPECIFIED MECHANISM (HCC): ICD-10-CM

## 2019-01-14 RX ORDER — WARFARIN SODIUM 6 MG/1
TABLET ORAL
Qty: 90 TABLET | Refills: 1 | Status: SHIPPED | OUTPATIENT
Start: 2019-01-14 | End: 2019-07-08 | Stop reason: SDUPTHER

## 2019-01-15 ENCOUNTER — OFFICE VISIT (OUTPATIENT)
Dept: FAMILY MEDICINE CLINIC | Facility: MEDICAL CENTER | Age: 54
End: 2019-01-15
Payer: COMMERCIAL

## 2019-01-15 ENCOUNTER — TELEPHONE (OUTPATIENT)
Dept: FAMILY MEDICINE CLINIC | Facility: MEDICAL CENTER | Age: 54
End: 2019-01-15

## 2019-01-15 VITALS
DIASTOLIC BLOOD PRESSURE: 84 MMHG | BODY MASS INDEX: 28.21 KG/M2 | HEART RATE: 68 BPM | SYSTOLIC BLOOD PRESSURE: 136 MMHG | WEIGHT: 208 LBS | RESPIRATION RATE: 16 BRPM

## 2019-01-15 DIAGNOSIS — E78.5 DYSLIPIDEMIA: ICD-10-CM

## 2019-01-15 DIAGNOSIS — I25.10 CORONARY ARTERY DISEASE INVOLVING NATIVE CORONARY ARTERY OF NATIVE HEART WITHOUT ANGINA PECTORIS: ICD-10-CM

## 2019-01-15 DIAGNOSIS — I10 ESSENTIAL HYPERTENSION: Primary | ICD-10-CM

## 2019-01-15 DIAGNOSIS — I25.5 ISCHEMIC CARDIOMYOPATHY: ICD-10-CM

## 2019-01-15 DIAGNOSIS — Z79.01 LONG TERM CURRENT USE OF ANTICOAGULANT THERAPY: ICD-10-CM

## 2019-01-15 DIAGNOSIS — I63.412 CEREBROVASCULAR ACCIDENT (CVA) DUE TO EMBOLISM OF LEFT MIDDLE CEREBRAL ARTERY (HCC): ICD-10-CM

## 2019-01-15 PROCEDURE — 1036F TOBACCO NON-USER: CPT | Performed by: FAMILY MEDICINE

## 2019-01-15 PROCEDURE — 3079F DIAST BP 80-89 MM HG: CPT | Performed by: FAMILY MEDICINE

## 2019-01-15 PROCEDURE — 3075F SYST BP GE 130 - 139MM HG: CPT | Performed by: FAMILY MEDICINE

## 2019-01-15 PROCEDURE — 99213 OFFICE O/P EST LOW 20 MIN: CPT | Performed by: FAMILY MEDICINE

## 2019-01-16 ENCOUNTER — TELEPHONE (OUTPATIENT)
Dept: FAMILY MEDICINE CLINIC | Facility: MEDICAL CENTER | Age: 54
End: 2019-01-16

## 2019-01-16 NOTE — TELEPHONE ENCOUNTER
Blood pressure medication would not going to change at this time  Some medication would be the cholesterol medication which is the Crestor or rosuvastatin  Does patient need a refill on his current blood pressure medication? Also, as I discussed with patient last night, I did reach out to Cardiology and Neurology  Cardiology has responded back and the cardiologist wants to see patient in the office in regards to the cardiac catheterization  Please have patient schedule an appointment with Cardiology so he can discuss cardiac catheterization with them

## 2019-01-16 NOTE — TELEPHONE ENCOUNTER
Wife says she was told by  that you were going to stop one med after finishing up what they have (she's assuming BP med)  Is that so? They're almost out if BP med  Would need to have to be able to take Fri  Can you send in

## 2019-01-16 NOTE — TELEPHONE ENCOUNTER
Dr Silvio Lawrence and Dr Jennifer Garland, patient was seen in the office today  As I am sure you both know patient has a history of coronary artery disease status post CABG and late in 2017 he suffered a CVA due to embolism of the middle cerebral artery  I have reviewed chart and although Neurology initially felt this was cardiac related there was no evidence of thrombus on conventional echocardiogram nor on transesophageal echocardiogram   None the less patient was started on Coumadin on the day he was discharged from the hospital     Dr Silvio Lawrence, since discharge from the hospital he has been seen by you twice  Dr Jennifer Garland, since discharge from the hospital he has been seen by you twice as well  He has continued to follow with me as well and has had multiple complaints as you can see by my notes  He had dizziness so I changed him from metoprolol to carvedilol which helped his dizziness but now he has bloody noses  He is on Coumadin which would explain the bloody noses but he refuses to believe this as his bloody noses started shortly after starting carvedilol  He is also having muscle aches which is likely secondary to his statin but that was not changed today  I wanted to decrease his dose of carvedilol or change it altogether and add an ACE-inhibitor but I proceeded to look through the records some more  Dr Silvio Lawrence, I see that back on 1/25/2018 you recommended patient have a cardiac catheterization and wanted to hold off on initiating an ACE-inhibitor until that time  It appears patient did not have his cardiac catheterization and I did ask him about this  He informed me he had been through so many tests he did not feel the cardiac catheterization would be beneficial   I had a long conversation with him about this today and he would be in agreement to a cardiac catheterization with some stipulations    He would prefer not to have any additional testing before the cardiac catheterization such as another stress test and would be okay with the catheterization if he could have it done at a time that is convenient for him  I told him I would reach out to you so I would please ask for your input on this  Dr Nunez Officer and Dr Jarvis Stevens, does patient need to remain on Coumadin at this time based on the information that I have provided and records that are in epic? Any help you can provide would be greatly appreciated

## 2019-01-16 NOTE — PROGRESS NOTES
Assessment/Plan:    No problem-specific Assessment & Plan notes found for this encounter  Diagnoses and all orders for this visit:    Essential hypertension    Dyslipidemia    Coronary artery disease involving native coronary artery of native heart without angina pectoris    Ischemic cardiomyopathy    Cerebrovascular accident (CVA) due to embolism of left middle cerebral artery ([address] )    Long term current use of anticoagulant therapy    Long discussion had with patient about his cardiovascular condition  His blood pressure is well controlled today  Lipids have been well controlled on previous labs  I informed patient his bloody nose is may be secondary to his aspirin or his Coumadin use but he does not believe so  He believes it is secondary to his carvedilol  Muscle pain likely due to his Crestor use  I wanted to review the chart before making any medication changes  I see that back in January 2017 patient was seen by Cardiology and it was recommended he have a cardiac catheterization  I asked patient about this and he stated he did not feel the need to have a catheterization at that time as he had been to multiple test up to that point  After discussing further he would be agreeable to having the cardiac catheterization as long as he did not need any additional testing and could schedule the cardiac catheterization at a time that would be convenient for him  I informed him I would reach out to his cardiologist to inquire about this  The note from Cardiology stated that initiation of an ACE-inhibitor would be delayed until after the cardiac catheterization  Therefore I will not change any of patient's medications at this time and wait for response back from Cardiology  I will also not change patient's Crestor as he wishes to complete the pills he has left but would like to switch to a different medication when refills are needed to see if it helps his muscle pain   As for the Coumadin patient would like to stop taking this is possible  I advised against that but informed him I would contact his cardiologist and his neurologist for additional information and he is agreeable to this  No medication changes at this time  Patient is currently stable  Message has been sent to Cardiology and Neurology, Dr Chuyita Herrera and Dr Liana Santos respectively and I will await their responses  Follow-up in three months or sooner if needed  Subjective:      Patient ID: Josee Ortiz is a 48 y o  male  Patient presents today for follow-up  He has an extensive cardiovascular history including coronary artery disease status post CABG, CVA due to embolism of the middle cerebral artery back in late 2017, hypertension, ischemic cardiomyopathy and dyslipidemia  Current medications include aspirin 81 mg daily, carvedilol 20 mg daily, Crestor 20 mg daily and Coumadin  Patient states that with the change from metoprolol to carvedilol his dizziness has improved but now he is getting nose bleeds  He does not believe it is related to his aspirin or his Coumadin use but believes it is secondary to the carvedilol as nose bleeds started within the 1st few days of starting carvedilol  He is happy however that his dizziness is gone but is upset that his nose is bleeding now  It does not happen often however  Patient also continues to have muscle pain which he believes is secondary to his Crestor  He is not interested in changing to a different medication at this time as he still has pills left and would rather change medications when he is ready for refill  He continues to get monthly INRs to monitor his Coumadin but would like to not have to take Coumadin any longer  Continues to decline flu vaccine  No chest pain or trouble breathing  No fevers              The following portions of the patient's history were reviewed and updated as appropriate:   He  has a past medical history of Cardiac disease and CABG  Current Outpatient Prescriptions   Medication Sig Dispense Refill    aspirin 81 mg chewable tablet Chew 1 tablet daily 30 tablet 0    carvedilol (COREG CR) 20 MG 24 hr capsule Take 1 capsule (20 mg total) by mouth daily 90 capsule 1    JANTOVEN 1 MG tablet       JANTOVEN 6 MG tablet TAKE ONE TABLET BY MOUTH EVERY DAY OR AS DIRECTED  90 tablet 1    rosuvastatin (CRESTOR) 20 MG tablet TAKE ONE TABLET BY MOUTH ONCE DAILY  90 tablet 0     No current facility-administered medications for this visit  He is allergic to penicillins       Review of Systems   Constitutional: Negative for fever  Respiratory: Negative for shortness of breath  Cardiovascular: Negative for chest pain  Objective:      /84 (BP Location: Left arm, Patient Position: Sitting, Cuff Size: Large)   Pulse 68   Resp 16   Wt 94 3 kg (208 lb)   BMI 28 21 kg/m²          Physical Exam   Constitutional: He appears well-developed and well-nourished  Cardiovascular: Normal rate, regular rhythm and normal heart sounds      Pulmonary/Chest: Effort normal and breath sounds normal

## 2019-01-22 ENCOUNTER — TELEPHONE (OUTPATIENT)
Dept: FAMILY MEDICINE CLINIC | Facility: MEDICAL CENTER | Age: 54
End: 2019-01-22

## 2019-01-22 DIAGNOSIS — I25.10 CORONARY ARTERY DISEASE INVOLVING NATIVE CORONARY ARTERY OF NATIVE HEART WITHOUT ANGINA PECTORIS: Primary | ICD-10-CM

## 2019-01-22 RX ORDER — LOVASTATIN 40 MG/1
40 TABLET ORAL
Qty: 90 TABLET | Refills: 0 | Status: SHIPPED | OUTPATIENT
Start: 2019-01-22 | End: 2019-04-19

## 2019-01-22 NOTE — TELEPHONE ENCOUNTER
Please call patient's wife  Call was not from patient  His wife was here today with his daughter  She informed me he needed a refill of his cholesterol medication but was told by patient that I would be changing the medication due to potential side effects he is having currently on Crestor  I am switching him to lovastatin 40 mg daily  Repeat labs in six weeks  Medication faxed and lab order placed  I also do not see that there is a cardiology follow-up scheduled

## 2019-01-22 NOTE — TELEPHONE ENCOUNTER
Patient needs a refill for Cholesterol medication  However, patient was told it will need to be changed to a different medication      Routed to Dr Menendez Every

## 2019-01-23 NOTE — TELEPHONE ENCOUNTER
Patients wife aware, she said that he has not set up his cardiology appt but will do so soon  Will  new medication and cynthia calendar for follow up labs, may be closer to 8 weeks due to his routine INR testing

## 2019-01-26 ENCOUNTER — APPOINTMENT (OUTPATIENT)
Dept: LAB | Facility: MEDICAL CENTER | Age: 54
End: 2019-01-26
Payer: COMMERCIAL

## 2019-01-28 ENCOUNTER — TELEPHONE (OUTPATIENT)
Dept: FAMILY MEDICINE CLINIC | Facility: MEDICAL CENTER | Age: 54
End: 2019-01-28

## 2019-01-28 ENCOUNTER — ANTICOAG VISIT (OUTPATIENT)
Dept: FAMILY MEDICINE CLINIC | Facility: MEDICAL CENTER | Age: 54
End: 2019-01-28

## 2019-02-23 ENCOUNTER — APPOINTMENT (OUTPATIENT)
Dept: LAB | Facility: MEDICAL CENTER | Age: 54
End: 2019-02-23
Payer: COMMERCIAL

## 2019-02-23 DIAGNOSIS — I25.10 CORONARY ARTERY DISEASE INVOLVING NATIVE CORONARY ARTERY OF NATIVE HEART WITHOUT ANGINA PECTORIS: ICD-10-CM

## 2019-02-23 LAB
ALBUMIN SERPL BCP-MCNC: 3.9 G/DL (ref 3.5–5)
ALP SERPL-CCNC: 89 U/L (ref 46–116)
ALT SERPL W P-5'-P-CCNC: 34 U/L (ref 12–78)
AST SERPL W P-5'-P-CCNC: 23 U/L (ref 5–45)
BILIRUB DIRECT SERPL-MCNC: 0.11 MG/DL (ref 0–0.2)
BILIRUB SERPL-MCNC: 0.52 MG/DL (ref 0.2–1)
CHOLEST SERPL-MCNC: 185 MG/DL (ref 50–200)
HDLC SERPL-MCNC: 42 MG/DL (ref 40–60)
LDLC SERPL CALC-MCNC: 102 MG/DL (ref 0–100)
PROT SERPL-MCNC: 7.6 G/DL (ref 6.4–8.2)
TRIGL SERPL-MCNC: 207 MG/DL

## 2019-02-23 PROCEDURE — 80076 HEPATIC FUNCTION PANEL: CPT

## 2019-02-23 PROCEDURE — 36415 COLL VENOUS BLD VENIPUNCTURE: CPT

## 2019-02-23 PROCEDURE — 80061 LIPID PANEL: CPT

## 2019-02-27 ENCOUNTER — ANTICOAG VISIT (OUTPATIENT)
Dept: FAMILY MEDICINE CLINIC | Facility: MEDICAL CENTER | Age: 54
End: 2019-02-27

## 2019-02-28 ENCOUNTER — TELEPHONE (OUTPATIENT)
Dept: FAMILY MEDICINE CLINIC | Facility: MEDICAL CENTER | Age: 54
End: 2019-02-28

## 2019-02-28 NOTE — TELEPHONE ENCOUNTER
----- Message from Franklin Cowden, DO sent at 2/27/2019 10:14 PM EST -----  Labs reviewed  Lipid profile shows elevated triglycerides however total cholesterol, good cholesterol and bad cholesterol are within range  Liver function is stable  Diet and exercise to help lower triglycerides  Continue lovastatin

## 2019-03-16 NOTE — TELEPHONE ENCOUNTER
Dr Yue Harrison and Chelsea Estrada,    I reviewed your note for Ritu Mendenhall was out of the office but we should likely bring him back for a follow up to ensure no new or persistent sx and review his stroke prevention meds    From his prior notes, he likely would need anticoagulation long term unless his EF rebounds to be >35%  Most often we use Coumadin for this as there is no real study looking at the [facility] in this setting but if he has difficulties with staying therapeutic or if there are drug interactions etc it would not be unreasonable to transition him  One question may be if it would be possible to get him off of the concurrent antiplatelet agent  Clinical team  Please give Ritu Dickinson a call and have him come in to see one of the stroke A/P's      Thanks!!    Nereida Kat

## 2019-03-19 NOTE — TELEPHONE ENCOUNTER
FYI   Pts wife scheduled with me with [facility] office 6/3 at 315  Could not come in any sooner due to work shift etc  Pt on wait list    Thanks  Wilbert

## 2019-03-28 DIAGNOSIS — Z79.01 LONG TERM CURRENT USE OF ANTICOAGULANT THERAPY: Primary | ICD-10-CM

## 2019-03-28 RX ORDER — WARFARIN SODIUM 1 MG/1
TABLET ORAL
Qty: 30 TABLET | Refills: 2 | Status: SHIPPED | OUTPATIENT
Start: 2019-03-28 | End: 2020-01-06 | Stop reason: SDUPTHER

## 2019-03-30 ENCOUNTER — APPOINTMENT (OUTPATIENT)
Dept: LAB | Facility: MEDICAL CENTER | Age: 54
End: 2019-03-30
Payer: COMMERCIAL

## 2019-04-01 ENCOUNTER — TELEPHONE (OUTPATIENT)
Dept: FAMILY MEDICINE CLINIC | Facility: MEDICAL CENTER | Age: 54
End: 2019-04-01

## 2019-04-01 ENCOUNTER — TRANSITIONAL CARE MANAGEMENT (OUTPATIENT)
Dept: FAMILY MEDICINE CLINIC | Facility: MEDICAL CENTER | Age: 54
End: 2019-04-01

## 2019-04-01 ENCOUNTER — ANTICOAG VISIT (OUTPATIENT)
Dept: FAMILY MEDICINE CLINIC | Facility: MEDICAL CENTER | Age: 54
End: 2019-04-01

## 2019-04-12 ENCOUNTER — OFFICE VISIT (OUTPATIENT)
Dept: CARDIOLOGY CLINIC | Facility: CLINIC | Age: 54
End: 2019-04-12
Payer: COMMERCIAL

## 2019-04-12 VITALS
WEIGHT: 208.5 LBS | DIASTOLIC BLOOD PRESSURE: 88 MMHG | HEIGHT: 71 IN | HEART RATE: 67 BPM | BODY MASS INDEX: 29.19 KG/M2 | SYSTOLIC BLOOD PRESSURE: 130 MMHG

## 2019-04-12 DIAGNOSIS — I63.412 CEREBROVASCULAR ACCIDENT (CVA) DUE TO EMBOLISM OF LEFT MIDDLE CEREBRAL ARTERY (HCC): ICD-10-CM

## 2019-04-12 DIAGNOSIS — R73.01 IMPAIRED FASTING GLUCOSE: Primary | ICD-10-CM

## 2019-04-12 DIAGNOSIS — I25.5 ISCHEMIC CARDIOMYOPATHY: ICD-10-CM

## 2019-04-12 DIAGNOSIS — I25.10 CORONARY ARTERY DISEASE INVOLVING NATIVE CORONARY ARTERY OF NATIVE HEART WITHOUT ANGINA PECTORIS: ICD-10-CM

## 2019-04-12 DIAGNOSIS — I10 ESSENTIAL HYPERTENSION: ICD-10-CM

## 2019-04-12 DIAGNOSIS — E78.5 DYSLIPIDEMIA: ICD-10-CM

## 2019-04-12 PROCEDURE — 93000 ELECTROCARDIOGRAM COMPLETE: CPT | Performed by: INTERNAL MEDICINE

## 2019-04-12 PROCEDURE — 99214 OFFICE O/P EST MOD 30 MIN: CPT | Performed by: INTERNAL MEDICINE

## 2019-04-16 DIAGNOSIS — I10 ESSENTIAL HYPERTENSION: ICD-10-CM

## 2019-04-16 RX ORDER — CARVEDILOL PHOSPHATE 20 MG/1
CAPSULE, EXTENDED RELEASE ORAL
Qty: 30 CAPSULE | Refills: 0 | Status: SHIPPED | OUTPATIENT
Start: 2019-04-16 | End: 2019-05-14 | Stop reason: SDUPTHER

## 2019-04-19 ENCOUNTER — OFFICE VISIT (OUTPATIENT)
Dept: FAMILY MEDICINE CLINIC | Facility: MEDICAL CENTER | Age: 54
End: 2019-04-19
Payer: COMMERCIAL

## 2019-04-19 VITALS
SYSTOLIC BLOOD PRESSURE: 130 MMHG | DIASTOLIC BLOOD PRESSURE: 80 MMHG | OXYGEN SATURATION: 98 % | HEART RATE: 66 BPM | WEIGHT: 204 LBS | BODY MASS INDEX: 28.45 KG/M2

## 2019-04-19 DIAGNOSIS — I10 ESSENTIAL HYPERTENSION: Primary | ICD-10-CM

## 2019-04-19 DIAGNOSIS — E78.5 DYSLIPIDEMIA: ICD-10-CM

## 2019-04-19 DIAGNOSIS — I63.412 CEREBROVASCULAR ACCIDENT (CVA) DUE TO EMBOLISM OF LEFT MIDDLE CEREBRAL ARTERY (HCC): ICD-10-CM

## 2019-04-19 DIAGNOSIS — I25.10 CORONARY ARTERY DISEASE INVOLVING NATIVE CORONARY ARTERY OF NATIVE HEART WITHOUT ANGINA PECTORIS: ICD-10-CM

## 2019-04-19 PROCEDURE — 3075F SYST BP GE 130 - 139MM HG: CPT | Performed by: FAMILY MEDICINE

## 2019-04-19 PROCEDURE — 99214 OFFICE O/P EST MOD 30 MIN: CPT | Performed by: FAMILY MEDICINE

## 2019-04-19 PROCEDURE — 3079F DIAST BP 80-89 MM HG: CPT | Performed by: FAMILY MEDICINE

## 2019-04-19 RX ORDER — PRAVASTATIN SODIUM 10 MG
10 TABLET ORAL
Qty: 90 TABLET | Refills: 0 | Status: SHIPPED | OUTPATIENT
Start: 2019-04-19 | End: 2019-07-30

## 2019-04-27 ENCOUNTER — APPOINTMENT (OUTPATIENT)
Dept: LAB | Facility: MEDICAL CENTER | Age: 54
End: 2019-04-27
Payer: COMMERCIAL

## 2019-04-29 ENCOUNTER — TELEPHONE (OUTPATIENT)
Dept: FAMILY MEDICINE CLINIC | Facility: MEDICAL CENTER | Age: 54
End: 2019-04-29

## 2019-04-29 ENCOUNTER — ANTICOAG VISIT (OUTPATIENT)
Dept: FAMILY MEDICINE CLINIC | Facility: MEDICAL CENTER | Age: 54
End: 2019-04-29

## 2019-05-14 DIAGNOSIS — I10 ESSENTIAL HYPERTENSION: ICD-10-CM

## 2019-05-14 RX ORDER — CARVEDILOL PHOSPHATE 20 MG/1
CAPSULE, EXTENDED RELEASE ORAL
Qty: 30 CAPSULE | Refills: 0 | Status: SHIPPED | OUTPATIENT
Start: 2019-05-14 | End: 2019-06-13 | Stop reason: SDUPTHER

## 2019-05-24 ENCOUNTER — TELEPHONE (OUTPATIENT)
Dept: FAMILY MEDICINE CLINIC | Facility: MEDICAL CENTER | Age: 54
End: 2019-05-24

## 2019-05-25 ENCOUNTER — APPOINTMENT (OUTPATIENT)
Dept: LAB | Facility: MEDICAL CENTER | Age: 54
End: 2019-05-25
Payer: COMMERCIAL

## 2019-05-28 ENCOUNTER — ANTICOAG VISIT (OUTPATIENT)
Dept: FAMILY MEDICINE CLINIC | Facility: MEDICAL CENTER | Age: 54
End: 2019-05-28

## 2019-06-03 ENCOUNTER — OFFICE VISIT (OUTPATIENT)
Dept: NEUROLOGY | Facility: CLINIC | Age: 54
End: 2019-06-03
Payer: COMMERCIAL

## 2019-06-03 VITALS
WEIGHT: 205 LBS | SYSTOLIC BLOOD PRESSURE: 130 MMHG | HEART RATE: 74 BPM | BODY MASS INDEX: 28.59 KG/M2 | DIASTOLIC BLOOD PRESSURE: 80 MMHG

## 2019-06-03 DIAGNOSIS — I10 ESSENTIAL HYPERTENSION: ICD-10-CM

## 2019-06-03 DIAGNOSIS — E78.5 DYSLIPIDEMIA: ICD-10-CM

## 2019-06-03 DIAGNOSIS — I63.412 CEREBROVASCULAR ACCIDENT (CVA) DUE TO EMBOLISM OF LEFT MIDDLE CEREBRAL ARTERY (HCC): Primary | ICD-10-CM

## 2019-06-03 PROCEDURE — 99214 OFFICE O/P EST MOD 30 MIN: CPT | Performed by: PHYSICIAN ASSISTANT

## 2019-06-06 DIAGNOSIS — I42.0 DILATED CARDIOMYOPATHY (HCC): Primary | ICD-10-CM

## 2019-06-07 ENCOUNTER — TELEPHONE (OUTPATIENT)
Dept: CARDIOLOGY CLINIC | Facility: CLINIC | Age: 54
End: 2019-06-07

## 2019-06-10 ENCOUNTER — TELEPHONE (OUTPATIENT)
Dept: CARDIOLOGY CLINIC | Facility: CLINIC | Age: 54
End: 2019-06-10

## 2019-06-13 DIAGNOSIS — I10 ESSENTIAL HYPERTENSION: ICD-10-CM

## 2019-06-14 RX ORDER — CARVEDILOL PHOSPHATE 20 MG/1
CAPSULE, EXTENDED RELEASE ORAL
Qty: 30 CAPSULE | Refills: 0 | Status: SHIPPED | OUTPATIENT
Start: 2019-06-14 | End: 2019-07-11 | Stop reason: SDUPTHER

## 2019-06-29 ENCOUNTER — APPOINTMENT (OUTPATIENT)
Dept: LAB | Facility: MEDICAL CENTER | Age: 54
End: 2019-06-29
Payer: COMMERCIAL

## 2019-07-01 ENCOUNTER — TELEPHONE (OUTPATIENT)
Dept: FAMILY MEDICINE CLINIC | Facility: MEDICAL CENTER | Age: 54
End: 2019-07-01

## 2019-07-01 ENCOUNTER — ANTICOAG VISIT (OUTPATIENT)
Dept: FAMILY MEDICINE CLINIC | Facility: MEDICAL CENTER | Age: 54
End: 2019-07-01

## 2019-07-01 NOTE — TELEPHONE ENCOUNTER
----- Message from Dasha Crowe DO sent at 7/1/2019  8:02 AM EDT -----  INR in range  Continue same dosing for Coumadin

## 2019-07-01 NOTE — TELEPHONE ENCOUNTER
LMOM for patient to call the office back  If  gives patient normal results, please forward message to clinical so we can update the coumadin tracking sheet

## 2019-07-08 DIAGNOSIS — I63.9 CEREBROVASCULAR ACCIDENT (CVA), UNSPECIFIED MECHANISM (HCC): ICD-10-CM

## 2019-07-08 RX ORDER — WARFARIN SODIUM 6 MG/1
TABLET ORAL
Qty: 90 TABLET | Refills: 1 | Status: SHIPPED | OUTPATIENT
Start: 2019-07-08 | End: 2020-01-06 | Stop reason: SDUPTHER

## 2019-07-11 DIAGNOSIS — I10 ESSENTIAL HYPERTENSION: ICD-10-CM

## 2019-07-11 RX ORDER — CARVEDILOL PHOSPHATE 20 MG/1
CAPSULE, EXTENDED RELEASE ORAL
Qty: 30 CAPSULE | Refills: 0 | Status: SHIPPED | OUTPATIENT
Start: 2019-07-11 | End: 2019-08-13 | Stop reason: SDUPTHER

## 2019-07-30 ENCOUNTER — OFFICE VISIT (OUTPATIENT)
Dept: FAMILY MEDICINE CLINIC | Facility: MEDICAL CENTER | Age: 54
End: 2019-07-30
Payer: COMMERCIAL

## 2019-07-30 VITALS
DIASTOLIC BLOOD PRESSURE: 88 MMHG | WEIGHT: 208 LBS | SYSTOLIC BLOOD PRESSURE: 138 MMHG | HEART RATE: 86 BPM | BODY MASS INDEX: 29.01 KG/M2 | RESPIRATION RATE: 16 BRPM

## 2019-07-30 DIAGNOSIS — R73.01 IMPAIRED FASTING GLUCOSE: ICD-10-CM

## 2019-07-30 DIAGNOSIS — H93.8X2 EAR MASS, LEFT: ICD-10-CM

## 2019-07-30 DIAGNOSIS — Z12.11 SCREENING FOR COLON CANCER: ICD-10-CM

## 2019-07-30 DIAGNOSIS — F41.8 ANXIETY ABOUT HEALTH: ICD-10-CM

## 2019-07-30 DIAGNOSIS — I10 ESSENTIAL HYPERTENSION: ICD-10-CM

## 2019-07-30 DIAGNOSIS — E78.5 DYSLIPIDEMIA: ICD-10-CM

## 2019-07-30 DIAGNOSIS — I63.412 CEREBROVASCULAR ACCIDENT (CVA) DUE TO EMBOLISM OF LEFT MIDDLE CEREBRAL ARTERY (HCC): Primary | ICD-10-CM

## 2019-07-30 PROCEDURE — 99214 OFFICE O/P EST MOD 30 MIN: CPT | Performed by: FAMILY MEDICINE

## 2019-07-30 PROCEDURE — 3075F SYST BP GE 130 - 139MM HG: CPT | Performed by: FAMILY MEDICINE

## 2019-07-30 NOTE — PROGRESS NOTES
Assessment/Plan:    No problem-specific Assessment & Plan notes found for this encounter  Diagnoses and all orders for this visit:    Cerebrovascular accident (CVA) due to embolism of left middle cerebral artery (HCC)  Stable  Continue Coumadin  Essential hypertension  -     Comprehensive metabolic panel; Future  -     Microalbumin / creatinine urine ratio  Improved on repeat measurement however it is still high normal   No change in medication at this time  Continue carvedilol  Check labs  Dyslipidemia  -     Comprehensive metabolic panel; Future  -     Lipid Panel with Direct LDL reflex; Future  Patient was urged to try another statin due to his cardiovascular disease  He refused to take another statin at this time  Check labs for evaluation  Impaired fasting glucose  -     Hemoglobin A1C; Future  Likely secondary to poor diet and lack of exercise  Patient was highly encouraged to eat a low-calorie diet and exercise regularly  Check A1c to assess for diabetes  BMI Counseling: Body mass index is 29 01 kg/m²  Discussed the patient's BMI with him  The BMI is above average  BMI counseling and education was provided to the patient  Nutrition recommendations include decreasing overall calorie intake  Exercise recommendations include moderate aerobic physical activity for 150 minutes/week  Ear mass, left  -     Ambulatory Referral to Otolaryngology; Future  Etiology unclear  Will have patient see ENT for evaluation  Anxiety about health  Anxiety secondary to patient's health problems  We discussed initiating medication, having patient go for counseling or both  Patient not interested in any intervention at this time  He feels he is handling his anxiety well  Screening for colon cancer  -     Cologuard; Future  Colonoscopy highly encouraged particularly due to family history of colon cancer in his mother but patient declined at this time    I recommended we at least do Cologuard and patient was agreeable  Will send the order to the manufacture to assess for insurance coverage  Follow-up in six months or sooner if needed  Subjective:      Patient ID: Connie Lynch is a 48 y o  male  Patient presents for follow-up  Has a CVA of the middle cerebral artery  He is currently on warfarin therapy  Is having some bruising but otherwise tolerating medication well  INRs have been stable  He has hypertension  He is currently on carvedilol 20 mg daily  Tolerating medication well without any lightheadedness or dizziness  He has elevated cholesterol  He is not currently on any statin at this time  He was on pravastatin but it was causing too many side effects  He is refusing to try any other statins at this time  He has elevated glucose  He admits to not eating very healthy and not exercising regularly  Takes no medication for his glucose elevation  He has a mass of his left ear that has been present for about one week  He believes he cut it with his hair irma  It is not healing well  He has been having some anxiety for the past few weeks  He is unsure if it is related to his blood pressure or his medical problems in general   There is no other stress in his family at this time  He does not wish to utilize medication or go for counseling at this time but he wanted to make me aware of his symptoms  Denies any depression  The following portions of the patient's history were reviewed and updated as appropriate: He  has a past medical history of Cardiac disease and CABG    He   Patient Active Problem List    Diagnosis Date Noted    Ear mass, left 07/30/2019    Impaired fasting glucose 02/21/2018    Long term current use of anticoagulant therapy 01/25/2018    Ischemic cardiomyopathy 10/17/2017    Dyslipidemia 10/17/2017    Essential hypertension 10/10/2017    Coronary artery disease involving native coronary artery of native heart without angina pectoris 10/10/2017    Cerebrovascular accident (CVA) due to embolism of middle cerebral artery ([facility] [address] ) 10/09/2017     He  has a past surgical history that includes Coronary artery bypass graft and Appendectomy  His family history includes Colon cancer in his mother; Hypertension in his father and mother  He  reports that he has quit smoking  He has quit using smokeless tobacco  He reports that he drinks alcohol  He reports that he does not use drugs  Current Outpatient Medications   Medication Sig Dispense Refill    aspirin 81 mg chewable tablet Chew 1 tablet daily 30 tablet 0    carvedilol (COREG CR) 20 MG 24 hr capsule TAKE ONE CAPSULE BY MOUTH ONCE DAILY  30 capsule 0    JANTOVEN 6 MG tablet TAKE ONE TABLET BY MOUTH ONCE DAILY or as directed 90 tablet 1    warfarin (JANTOVEN) 1 mg tablet TAKE ONE TABLET BY MOUTH EVERY DAY OR AS DIRECTED 30 tablet 2     No current facility-administered medications for this visit  Current Outpatient Medications on File Prior to Visit   Medication Sig    aspirin 81 mg chewable tablet Chew 1 tablet daily    carvedilol (COREG CR) 20 MG 24 hr capsule TAKE ONE CAPSULE BY MOUTH ONCE DAILY     JANTOVEN 6 MG tablet TAKE ONE TABLET BY MOUTH ONCE DAILY or as directed    warfarin (JANTOVEN) 1 mg tablet TAKE ONE TABLET BY MOUTH EVERY DAY OR AS DIRECTED     No current facility-administered medications on file prior to visit  He is allergic to penicillins       Review of Systems   Constitutional: Negative for fever  Respiratory: Negative for shortness of breath  Cardiovascular: Negative for chest pain  Objective:      /88 (BP Location: Left arm, Patient Position: Sitting, Cuff Size: Large)   Pulse 86   Resp 16   Wt 94 3 kg (208 lb)   BMI 29 01 kg/m²          Physical Exam   Constitutional: He appears well-developed and well-nourished  HENT:   Ears:    Cardiovascular: Normal rate, regular rhythm and normal heart sounds  Pulmonary/Chest: Effort normal and breath sounds normal

## 2019-08-03 ENCOUNTER — APPOINTMENT (OUTPATIENT)
Dept: LAB | Facility: MEDICAL CENTER | Age: 54
End: 2019-08-03
Payer: COMMERCIAL

## 2019-08-03 DIAGNOSIS — R73.01 IMPAIRED FASTING GLUCOSE: ICD-10-CM

## 2019-08-03 DIAGNOSIS — E78.5 DYSLIPIDEMIA: ICD-10-CM

## 2019-08-03 DIAGNOSIS — I10 ESSENTIAL HYPERTENSION: ICD-10-CM

## 2019-08-03 LAB
ALBUMIN SERPL BCP-MCNC: 3.7 G/DL (ref 3.5–5)
ALP SERPL-CCNC: 84 U/L (ref 46–116)
ALT SERPL W P-5'-P-CCNC: 30 U/L (ref 12–78)
ANION GAP SERPL CALCULATED.3IONS-SCNC: 5 MMOL/L (ref 4–13)
AST SERPL W P-5'-P-CCNC: 15 U/L (ref 5–45)
BILIRUB SERPL-MCNC: 0.46 MG/DL (ref 0.2–1)
BUN SERPL-MCNC: 19 MG/DL (ref 5–25)
CALCIUM SERPL-MCNC: 8.5 MG/DL (ref 8.3–10.1)
CHLORIDE SERPL-SCNC: 106 MMOL/L (ref 100–108)
CHOLEST SERPL-MCNC: 238 MG/DL (ref 50–200)
CO2 SERPL-SCNC: 27 MMOL/L (ref 21–32)
CREAT SERPL-MCNC: 0.81 MG/DL (ref 0.6–1.3)
CREAT UR-MCNC: 155 MG/DL
EST. AVERAGE GLUCOSE BLD GHB EST-MCNC: 123 MG/DL
GFR SERPL CREATININE-BSD FRML MDRD: 103 ML/MIN/1.73SQ M
GLUCOSE P FAST SERPL-MCNC: 100 MG/DL (ref 65–99)
HBA1C MFR BLD: 5.9 % (ref 4.2–6.3)
HDLC SERPL-MCNC: 33 MG/DL (ref 40–60)
LDLC SERPL CALC-MCNC: 165 MG/DL (ref 0–100)
MICROALBUMIN UR-MCNC: 11.1 MG/L (ref 0–20)
MICROALBUMIN/CREAT 24H UR: 7 MG/G CREATININE (ref 0–30)
POTASSIUM SERPL-SCNC: 3.9 MMOL/L (ref 3.5–5.3)
PROT SERPL-MCNC: 7.2 G/DL (ref 6.4–8.2)
SODIUM SERPL-SCNC: 138 MMOL/L (ref 136–145)
TRIGL SERPL-MCNC: 202 MG/DL

## 2019-08-03 PROCEDURE — 82043 UR ALBUMIN QUANTITATIVE: CPT | Performed by: FAMILY MEDICINE

## 2019-08-03 PROCEDURE — 83036 HEMOGLOBIN GLYCOSYLATED A1C: CPT

## 2019-08-03 PROCEDURE — 80053 COMPREHEN METABOLIC PANEL: CPT

## 2019-08-03 PROCEDURE — 82570 ASSAY OF URINE CREATININE: CPT | Performed by: FAMILY MEDICINE

## 2019-08-03 PROCEDURE — 80061 LIPID PANEL: CPT

## 2019-08-05 ENCOUNTER — ANTICOAG VISIT (OUTPATIENT)
Dept: FAMILY MEDICINE CLINIC | Facility: MEDICAL CENTER | Age: 54
End: 2019-08-05

## 2019-08-13 DIAGNOSIS — I10 ESSENTIAL HYPERTENSION: ICD-10-CM

## 2019-08-14 RX ORDER — CARVEDILOL PHOSPHATE 20 MG/1
CAPSULE, EXTENDED RELEASE ORAL
Qty: 90 CAPSULE | Refills: 0 | Status: SHIPPED | OUTPATIENT
Start: 2019-08-14 | End: 2019-11-11 | Stop reason: SDUPTHER

## 2019-09-03 ENCOUNTER — TELEPHONE (OUTPATIENT)
Dept: FAMILY MEDICINE CLINIC | Facility: MEDICAL CENTER | Age: 54
End: 2019-09-03

## 2019-09-03 DIAGNOSIS — Z79.01 LONG TERM CURRENT USE OF ANTICOAGULANT THERAPY: Primary | ICD-10-CM

## 2019-09-03 NOTE — TELEPHONE ENCOUNTER
Twyla Corral requests the [employer] INR scripts for the patient to be written for [email or web address]  Patient will like to go Saturday morning  Twyla Corral would like a call back to  script

## 2019-09-04 NOTE — TELEPHONE ENCOUNTER
New standing order generated and has been printed  Patient or his wife can  the order or we can fax it to the [facility] of their choice

## 2019-09-08 LAB
INR PPP: 2.8
PROTHROMBIN TIME: 28.5 SEC (ref 9–11.5)

## 2019-09-09 ENCOUNTER — ANTICOAG VISIT (OUTPATIENT)
Dept: FAMILY MEDICINE CLINIC | Facility: MEDICAL CENTER | Age: 54
End: 2019-09-09

## 2019-10-13 LAB
INR PPP: 3.4
PROTHROMBIN TIME: 31.9 SEC (ref 9–11.5)

## 2019-10-14 ENCOUNTER — TELEPHONE (OUTPATIENT)
Dept: FAMILY MEDICINE CLINIC | Facility: MEDICAL CENTER | Age: 54
End: 2019-10-14

## 2019-10-15 ENCOUNTER — ANTICOAG VISIT (OUTPATIENT)
Dept: FAMILY MEDICINE CLINIC | Facility: MEDICAL CENTER | Age: 54
End: 2019-10-15

## 2019-10-16 LAB
INR PPP: 2.5
PROTHROMBIN TIME: 23.7 SEC (ref 9–11.5)

## 2019-10-17 ENCOUNTER — ANTICOAG VISIT (OUTPATIENT)
Dept: FAMILY MEDICINE CLINIC | Facility: MEDICAL CENTER | Age: 54
End: 2019-10-17

## 2019-10-17 ENCOUNTER — TELEPHONE (OUTPATIENT)
Dept: FAMILY MEDICINE CLINIC | Facility: MEDICAL CENTER | Age: 54
End: 2019-10-17

## 2019-10-17 NOTE — TELEPHONE ENCOUNTER
----- Message from Meghan Rawls DO sent at 10/17/2019  8:28 AM EDT -----  INR within range  Unclear why it was elevated five days ago  May have been lab error versus medication error verses change in diet  Keep same dosing schedule for Coumadin  Repeat INR one month

## 2019-10-30 ENCOUNTER — TELEPHONE (OUTPATIENT)
Dept: FAMILY MEDICINE CLINIC | Facility: MEDICAL CENTER | Age: 54
End: 2019-10-30

## 2019-10-30 NOTE — TELEPHONE ENCOUNTER
Please call to confirm if he received him Cologuard kit  It looks like they tried to reach him but communication is not documented

## 2019-11-11 DIAGNOSIS — I10 ESSENTIAL HYPERTENSION: ICD-10-CM

## 2019-11-11 RX ORDER — CARVEDILOL PHOSPHATE 20 MG/1
CAPSULE, EXTENDED RELEASE ORAL
Qty: 90 CAPSULE | Refills: 1 | Status: SHIPPED | OUTPATIENT
Start: 2019-11-11 | End: 2019-12-09 | Stop reason: SDUPTHER

## 2019-11-17 LAB
INR PPP: 2.4
PROTHROMBIN TIME: 23.4 SEC (ref 9–11.5)

## 2019-11-18 ENCOUNTER — ANTICOAG VISIT (OUTPATIENT)
Dept: FAMILY MEDICINE CLINIC | Facility: MEDICAL CENTER | Age: 54
End: 2019-11-18

## 2019-12-09 DIAGNOSIS — I10 ESSENTIAL HYPERTENSION: ICD-10-CM

## 2019-12-11 RX ORDER — CARVEDILOL PHOSPHATE 20 MG/1
20 CAPSULE, EXTENDED RELEASE ORAL DAILY
Qty: 90 CAPSULE | Refills: 1 | Status: SHIPPED | OUTPATIENT
Start: 2019-12-11 | End: 2020-06-02

## 2019-12-22 LAB
INR PPP: 2.5
PROTHROMBIN TIME: 23.9 SEC (ref 9–11.5)

## 2019-12-23 ENCOUNTER — TELEPHONE (OUTPATIENT)
Dept: FAMILY MEDICINE CLINIC | Facility: MEDICAL CENTER | Age: 54
End: 2019-12-23

## 2019-12-23 ENCOUNTER — ANTICOAG VISIT (OUTPATIENT)
Dept: FAMILY MEDICINE CLINIC | Facility: MEDICAL CENTER | Age: 54
End: 2019-12-23

## 2019-12-23 NOTE — TELEPHONE ENCOUNTER
----- Message from Mona Birmingham DO sent at 12/23/2019  9:00 AM EST -----  INR within range  Continue current dosing for Coumadin  Repeat INR in one month

## 2020-01-06 DIAGNOSIS — Z79.01 LONG TERM CURRENT USE OF ANTICOAGULANT THERAPY: ICD-10-CM

## 2020-01-06 DIAGNOSIS — I63.9 CEREBROVASCULAR ACCIDENT (CVA), UNSPECIFIED MECHANISM (HCC): ICD-10-CM

## 2020-01-06 RX ORDER — WARFARIN SODIUM 1 MG/1
TABLET ORAL
Qty: 30 TABLET | Refills: 2 | Status: SHIPPED | OUTPATIENT
Start: 2020-01-06 | End: 2020-01-31

## 2020-01-06 RX ORDER — WARFARIN SODIUM 6 MG/1
6 TABLET ORAL DAILY
Qty: 90 TABLET | Refills: 1 | Status: SHIPPED | OUTPATIENT
Start: 2020-01-06 | End: 2020-06-30

## 2020-01-26 LAB
INR PPP: 2.6
PROTHROMBIN TIME: 24.6 SEC (ref 9–11.5)

## 2020-01-27 ENCOUNTER — ANTICOAG VISIT (OUTPATIENT)
Dept: FAMILY MEDICINE CLINIC | Facility: MEDICAL CENTER | Age: 55
End: 2020-01-27

## 2020-01-31 DIAGNOSIS — Z79.01 LONG TERM CURRENT USE OF ANTICOAGULANT THERAPY: ICD-10-CM

## 2020-01-31 RX ORDER — WARFARIN SODIUM 1 MG/1
TABLET ORAL
Qty: 30 TABLET | Refills: 0 | Status: SHIPPED | OUTPATIENT
Start: 2020-01-31 | End: 2020-02-10

## 2020-02-04 ENCOUNTER — OFFICE VISIT (OUTPATIENT)
Dept: FAMILY MEDICINE CLINIC | Facility: MEDICAL CENTER | Age: 55
End: 2020-02-04
Payer: COMMERCIAL

## 2020-02-04 VITALS
WEIGHT: 206 LBS | BODY MASS INDEX: 27.9 KG/M2 | HEIGHT: 72 IN | HEART RATE: 74 BPM | RESPIRATION RATE: 16 BRPM | DIASTOLIC BLOOD PRESSURE: 76 MMHG | SYSTOLIC BLOOD PRESSURE: 122 MMHG

## 2020-02-04 DIAGNOSIS — Z12.11 SCREENING FOR COLON CANCER: ICD-10-CM

## 2020-02-04 DIAGNOSIS — I25.5 ISCHEMIC CARDIOMYOPATHY: ICD-10-CM

## 2020-02-04 DIAGNOSIS — R73.9 HYPERGLYCEMIA: ICD-10-CM

## 2020-02-04 DIAGNOSIS — E78.5 DYSLIPIDEMIA: ICD-10-CM

## 2020-02-04 DIAGNOSIS — I25.10 CORONARY ARTERY DISEASE INVOLVING NATIVE CORONARY ARTERY OF NATIVE HEART WITHOUT ANGINA PECTORIS: ICD-10-CM

## 2020-02-04 DIAGNOSIS — I10 ESSENTIAL HYPERTENSION: Primary | ICD-10-CM

## 2020-02-04 DIAGNOSIS — I63.412 CEREBROVASCULAR ACCIDENT (CVA) DUE TO EMBOLISM OF LEFT MIDDLE CEREBRAL ARTERY (HCC): ICD-10-CM

## 2020-02-04 PROCEDURE — 99214 OFFICE O/P EST MOD 30 MIN: CPT | Performed by: FAMILY MEDICINE

## 2020-02-04 PROCEDURE — 3074F SYST BP LT 130 MM HG: CPT | Performed by: FAMILY MEDICINE

## 2020-02-04 PROCEDURE — 3078F DIAST BP <80 MM HG: CPT | Performed by: FAMILY MEDICINE

## 2020-02-04 PROCEDURE — 3008F BODY MASS INDEX DOCD: CPT | Performed by: FAMILY MEDICINE

## 2020-02-04 PROCEDURE — 1036F TOBACCO NON-USER: CPT | Performed by: FAMILY MEDICINE

## 2020-02-04 NOTE — PROGRESS NOTES
Assessment/Plan:       Diagnoses and all orders for this visit:    Essential hypertension  -     Comprehensive metabolic panel; Future  -     Microalbumin / creatinine urine ratio  Blood pressure well controlled  Continue carvedilol  Check labs to assess renal function  Dyslipidemia  -     Comprehensive metabolic panel; Future  -     Lipid Panel with Direct LDL reflex; Future  Known dyslipidemia  Refuses to take any additional medication at this time  Monitor  Check labs  Cerebrovascular accident (CVA) due to embolism of left middle cerebral artery (HCC)  Currently on Coumadin  INRs have been stable  Continue Coumadin  Hyperglycemia  -     Comprehensive metabolic panel; Future  -     Hemoglobin A1C; Future  Check labs to assess for overt diabetes  No additional medication at this time  Will await lab results and proceed from there  Coronary artery disease involving native coronary artery of native heart without angina pectoris  Ischemic cardiomyopathy  Known CAD with ischemic cardiomyopathy  Keep upcoming appointment with Cardiology for April  Screening for colon cancer  Patient will not perform colonoscopy or home stool testing until his insurance changes  BMI Counseling: Body mass index is 28 33 kg/m²  The BMI is above normal  Nutrition recommendations include decreasing overall calorie intake  Exercise recommendations include moderate aerobic physical activity for 150 minutes/week  Age-appropriate vaccinations highly recommended but patient declined  Follow-up in six months or sooner if needed  Subjective:      Patient ID: Cayden Mukherjee is a 48 y o  male  Patient presents for follow-up  He has hypertension  Currently on carvedilol 20 mg daily  Tolerating this medication very well  Denies lightheadedness  He has hyperlipidemia  Was on multiple different statins in the past but could not tolerate any of them  Currently on no medication for his lipids    He is utilizing lifestyle modification to keep his lipid levels controlled  Refuses to take additional medication  He has CVA due to embolism  He is currently on Coumadin  Tolerating medication well without any abnormal bleeding  He has hyperglycemia  On no medication at this time  Utilizing lifestyle modification to keep his glucose level controlled  He has CAD and ischemic cardiomyopathy  He is followed by Cardiology  He has not yet had a colonoscopy  He will not be getting one until his insurance is straightened out as his current insurance has poor coverage  The following portions of the patient's history were reviewed and updated as appropriate: He  has a past medical history of Cardiac disease, CABG, and Stroke ([facility] [address] )  He   Patient Active Problem List    Diagnosis Date Noted    Ear mass, left 07/30/2019    Hyperglycemia 02/21/2018    Long term current use of anticoagulant therapy 01/25/2018    Ischemic cardiomyopathy 10/17/2017    Dyslipidemia 10/17/2017    Essential hypertension 10/10/2017    Coronary artery disease involving native coronary artery of native heart without angina pectoris 10/10/2017    Cerebrovascular accident (CVA) due to embolism of middle cerebral artery ([facility] [address] ) 10/09/2017     He  has a past surgical history that includes Coronary artery bypass graft and Appendectomy  His family history includes Colon cancer in his mother; Hypertension in his father and mother  He  reports that he has quit smoking  He has quit using smokeless tobacco  He reports that he drinks alcohol  He reports that he does not use drugs    Current Outpatient Medications   Medication Sig Dispense Refill    aspirin 81 mg chewable tablet Chew 1 tablet daily 30 tablet 0    carvedilol (COREG CR) 20 MG 24 hr capsule Take 1 capsule (20 mg total) by mouth daily 90 capsule 1    warfarin (COUMADIN) 1 mg tablet TAKE ONE TABLET BY MOUTH EVERY DAY OR AS DIRECTED 30 tablet 0    warfarin (JANTOVEN) 6 mg tablet Take 1 tablet (6 mg total) by mouth daily As directed 90 tablet 1     No current facility-administered medications for this visit  Current Outpatient Medications on File Prior to Visit   Medication Sig    aspirin 81 mg chewable tablet Chew 1 tablet daily    carvedilol (COREG CR) 20 MG 24 hr capsule Take 1 capsule (20 mg total) by mouth daily    warfarin (COUMADIN) 1 mg tablet TAKE ONE TABLET BY MOUTH EVERY DAY OR AS DIRECTED    warfarin (JANTOVEN) 6 mg tablet Take 1 tablet (6 mg total) by mouth daily As directed     No current facility-administered medications on file prior to visit  He is allergic to penicillins       Review of Systems   Constitutional: Negative for fever  Respiratory: Negative for shortness of breath  Cardiovascular: Negative for chest pain  Hematological: Does not bruise/bleed easily  Objective:      /76 (BP Location: Left arm, Patient Position: Sitting, Cuff Size: Adult)   Pulse 74   Resp 16   Ht 5' 11 5" (1 816 m)   Wt 93 4 kg (206 lb)   BMI 28 33 kg/m²          Physical Exam   Constitutional: He appears well-developed and well-nourished  HENT:   Head: Normocephalic  Cardiovascular: Normal rate, regular rhythm and normal heart sounds     Pulmonary/Chest: Effort normal and breath sounds normal

## 2020-02-08 DIAGNOSIS — Z79.01 LONG TERM CURRENT USE OF ANTICOAGULANT THERAPY: ICD-10-CM

## 2020-02-10 DIAGNOSIS — Z79.01 LONG TERM CURRENT USE OF ANTICOAGULANT THERAPY: ICD-10-CM

## 2020-02-10 RX ORDER — WARFARIN SODIUM 1 MG/1
TABLET ORAL
Qty: 90 TABLET | Refills: 1 | Status: SHIPPED | OUTPATIENT
Start: 2020-02-10 | End: 2020-06-05

## 2020-02-10 RX ORDER — WARFARIN SODIUM 1 MG/1
TABLET ORAL
Qty: 90 TABLET | Refills: 1 | Status: SHIPPED | OUTPATIENT
Start: 2020-02-10 | End: 2020-02-10 | Stop reason: SDUPTHER

## 2020-03-02 ENCOUNTER — ANTICOAG VISIT (OUTPATIENT)
Dept: FAMILY MEDICINE CLINIC | Facility: MEDICAL CENTER | Age: 55
End: 2020-03-02

## 2020-03-02 LAB
ALBUMIN SERPL-MCNC: 4.5 G/DL (ref 3.6–5.1)
ALBUMIN/CREAT UR: 3 MCG/MG CREAT
ALBUMIN/GLOB SERPL: 1.7 (CALC) (ref 1–2.5)
ALP SERPL-CCNC: 81 U/L (ref 35–144)
ALT SERPL-CCNC: 16 U/L (ref 9–46)
AST SERPL-CCNC: 16 U/L (ref 10–35)
BILIRUB SERPL-MCNC: 0.5 MG/DL (ref 0.2–1.2)
BUN SERPL-MCNC: 19 MG/DL (ref 7–25)
BUN/CREAT SERPL: ABNORMAL (CALC) (ref 6–22)
CALCIUM SERPL-MCNC: 9.4 MG/DL (ref 8.6–10.3)
CHLORIDE SERPL-SCNC: 105 MMOL/L (ref 98–110)
CHOLEST SERPL-MCNC: 251 MG/DL
CHOLEST/HDLC SERPL: 6.4 (CALC)
CO2 SERPL-SCNC: 27 MMOL/L (ref 20–32)
CREAT SERPL-MCNC: 0.9 MG/DL (ref 0.7–1.33)
CREAT UR-MCNC: 90 MG/DL (ref 20–320)
GLOBULIN SER CALC-MCNC: 2.6 G/DL (CALC) (ref 1.9–3.7)
GLUCOSE SERPL-MCNC: 112 MG/DL (ref 65–99)
HBA1C MFR BLD: 5.8 % OF TOTAL HGB
HDLC SERPL-MCNC: 39 MG/DL
INR PPP: 2.2
LDLC SERPL CALC-MCNC: 179 MG/DL (CALC)
MICROALBUMIN UR-MCNC: 0.3 MG/DL
NONHDLC SERPL-MCNC: 212 MG/DL (CALC)
POTASSIUM SERPL-SCNC: 4.9 MMOL/L (ref 3.5–5.3)
PROT SERPL-MCNC: 7.1 G/DL (ref 6.1–8.1)
PROTHROMBIN TIME: 21.1 SEC (ref 9–11.5)
SL AMB EGFR AFRICAN AMERICAN: 113 ML/MIN/1.73M2
SL AMB EGFR NON AFRICAN AMERICAN: 98 ML/MIN/1.73M2
SODIUM SERPL-SCNC: 139 MMOL/L (ref 135–146)
TRIGL SERPL-MCNC: 179 MG/DL

## 2020-03-28 LAB
INR PPP: 2.5
PROTHROMBIN TIME: 23.6 SEC (ref 9–11.5)

## 2020-03-30 ENCOUNTER — ANTICOAG VISIT (OUTPATIENT)
Dept: FAMILY MEDICINE CLINIC | Facility: MEDICAL CENTER | Age: 55
End: 2020-03-30

## 2020-03-30 DIAGNOSIS — Z79.01 LONG TERM CURRENT USE OF ANTICOAGULANT THERAPY: Primary | ICD-10-CM

## 2020-03-30 DIAGNOSIS — I63.412 CEREBROVASCULAR ACCIDENT (CVA) DUE TO EMBOLISM OF LEFT MIDDLE CEREBRAL ARTERY (HCC): ICD-10-CM

## 2020-04-16 ENCOUNTER — TELEMEDICINE (OUTPATIENT)
Dept: CARDIOLOGY CLINIC | Facility: CLINIC | Age: 55
End: 2020-04-16
Payer: COMMERCIAL

## 2020-04-16 VITALS — BODY MASS INDEX: 28.61 KG/M2 | WEIGHT: 208 LBS

## 2020-04-16 DIAGNOSIS — I25.10 CORONARY ARTERY DISEASE INVOLVING NATIVE CORONARY ARTERY OF NATIVE HEART WITHOUT ANGINA PECTORIS: ICD-10-CM

## 2020-04-16 DIAGNOSIS — I10 ESSENTIAL HYPERTENSION: ICD-10-CM

## 2020-04-16 DIAGNOSIS — I63.412 CEREBROVASCULAR ACCIDENT (CVA) DUE TO EMBOLISM OF LEFT MIDDLE CEREBRAL ARTERY (HCC): ICD-10-CM

## 2020-04-16 DIAGNOSIS — I25.5 ISCHEMIC CARDIOMYOPATHY: Primary | ICD-10-CM

## 2020-04-16 DIAGNOSIS — E78.5 DYSLIPIDEMIA: ICD-10-CM

## 2020-04-16 PROCEDURE — 99214 OFFICE O/P EST MOD 30 MIN: CPT | Performed by: INTERNAL MEDICINE

## 2020-04-26 LAB
INR PPP: 3.7
PROTHROMBIN TIME: 34.6 SEC (ref 9–11.5)

## 2020-04-27 ENCOUNTER — TELEPHONE (OUTPATIENT)
Dept: FAMILY MEDICINE CLINIC | Facility: MEDICAL CENTER | Age: 55
End: 2020-04-27

## 2020-04-27 ENCOUNTER — ANTICOAG VISIT (OUTPATIENT)
Dept: FAMILY MEDICINE CLINIC | Facility: MEDICAL CENTER | Age: 55
End: 2020-04-27

## 2020-05-03 LAB
INR PPP: 3.2
PROTHROMBIN TIME: 30.6 SEC (ref 9–11.5)

## 2020-05-04 ENCOUNTER — TELEPHONE (OUTPATIENT)
Dept: FAMILY MEDICINE CLINIC | Facility: MEDICAL CENTER | Age: 55
End: 2020-05-04

## 2020-05-04 ENCOUNTER — ANTICOAG VISIT (OUTPATIENT)
Dept: FAMILY MEDICINE CLINIC | Facility: MEDICAL CENTER | Age: 55
End: 2020-05-04

## 2020-05-24 LAB
INR PPP: 2.7
PROTHROMBIN TIME: 25.7 SEC (ref 9–11.5)

## 2020-05-26 ENCOUNTER — ANTICOAG VISIT (OUTPATIENT)
Dept: FAMILY MEDICINE CLINIC | Facility: MEDICAL CENTER | Age: 55
End: 2020-05-26

## 2020-06-02 DIAGNOSIS — I10 ESSENTIAL HYPERTENSION: ICD-10-CM

## 2020-06-02 RX ORDER — CARVEDILOL PHOSPHATE 20 MG/1
CAPSULE, EXTENDED RELEASE ORAL
Qty: 90 CAPSULE | Refills: 0 | Status: SHIPPED | OUTPATIENT
Start: 2020-06-02 | End: 2020-07-24

## 2020-06-05 DIAGNOSIS — Z79.01 LONG TERM CURRENT USE OF ANTICOAGULANT THERAPY: ICD-10-CM

## 2020-06-05 RX ORDER — WARFARIN SODIUM 1 MG/1
TABLET ORAL
Qty: 30 TABLET | Refills: 0 | Status: SHIPPED | OUTPATIENT
Start: 2020-06-05 | End: 2020-06-08

## 2020-06-06 DIAGNOSIS — Z79.01 LONG TERM CURRENT USE OF ANTICOAGULANT THERAPY: ICD-10-CM

## 2020-06-08 RX ORDER — WARFARIN SODIUM 1 MG/1
TABLET ORAL
Qty: 90 TABLET | Refills: 0 | Status: SHIPPED | OUTPATIENT
Start: 2020-06-08 | End: 2020-06-08 | Stop reason: SDUPTHER

## 2020-06-08 RX ORDER — WARFARIN SODIUM 1 MG/1
TABLET ORAL
Qty: 90 TABLET | Refills: 0 | Status: SHIPPED | OUTPATIENT
Start: 2020-06-08 | End: 2020-08-26 | Stop reason: SDUPTHER

## 2020-06-21 LAB
INR PPP: 2.4
PROTHROMBIN TIME: 23.3 SEC (ref 9–11.5)

## 2020-06-22 ENCOUNTER — ANTICOAG VISIT (OUTPATIENT)
Dept: FAMILY MEDICINE CLINIC | Facility: MEDICAL CENTER | Age: 55
End: 2020-06-22

## 2020-06-30 DIAGNOSIS — I63.9 CEREBROVASCULAR ACCIDENT (CVA), UNSPECIFIED MECHANISM (HCC): ICD-10-CM

## 2020-06-30 RX ORDER — WARFARIN SODIUM 6 MG/1
6 TABLET ORAL DAILY
Qty: 90 TABLET | Refills: 1 | Status: SHIPPED | OUTPATIENT
Start: 2020-06-30 | End: 2020-08-26 | Stop reason: SDUPTHER

## 2020-07-23 DIAGNOSIS — I10 ESSENTIAL HYPERTENSION: ICD-10-CM

## 2020-07-24 RX ORDER — CARVEDILOL PHOSPHATE 20 MG/1
CAPSULE, EXTENDED RELEASE ORAL
Qty: 30 CAPSULE | Refills: 0 | Status: SHIPPED | OUTPATIENT
Start: 2020-07-24 | End: 2020-08-11 | Stop reason: SDUPTHER

## 2020-07-26 LAB
INR PPP: 2.5
PROTHROMBIN TIME: 24.4 SEC (ref 9–11.5)

## 2020-07-27 ENCOUNTER — TELEPHONE (OUTPATIENT)
Dept: FAMILY MEDICINE CLINIC | Facility: MEDICAL CENTER | Age: 55
End: 2020-07-27

## 2020-07-27 ENCOUNTER — ANTICOAG VISIT (OUTPATIENT)
Dept: FAMILY MEDICINE CLINIC | Facility: MEDICAL CENTER | Age: 55
End: 2020-07-27

## 2020-07-27 NOTE — TELEPHONE ENCOUNTER
----- Message from Zuleyma Caldera DO sent at 7/27/2020  8:06 AM EDT -----  INR within range  Continue current dosing for Coumadin  Repeat INR in one month

## 2020-08-11 ENCOUNTER — OFFICE VISIT (OUTPATIENT)
Dept: FAMILY MEDICINE CLINIC | Facility: MEDICAL CENTER | Age: 55
End: 2020-08-11
Payer: COMMERCIAL

## 2020-08-11 VITALS
RESPIRATION RATE: 16 BRPM | BODY MASS INDEX: 28.17 KG/M2 | WEIGHT: 208 LBS | DIASTOLIC BLOOD PRESSURE: 70 MMHG | TEMPERATURE: 97.8 F | HEART RATE: 70 BPM | SYSTOLIC BLOOD PRESSURE: 134 MMHG | HEIGHT: 72 IN

## 2020-08-11 DIAGNOSIS — Z12.5 SCREENING FOR PROSTATE CANCER: ICD-10-CM

## 2020-08-11 DIAGNOSIS — Z11.4 SCREENING FOR HIV (HUMAN IMMUNODEFICIENCY VIRUS): ICD-10-CM

## 2020-08-11 DIAGNOSIS — Z00.00 PHYSICAL EXAM, ANNUAL: ICD-10-CM

## 2020-08-11 DIAGNOSIS — I67.9 CEREBROVASCULAR DISEASE: ICD-10-CM

## 2020-08-11 DIAGNOSIS — Z12.2 ENCOUNTER FOR SCREENING FOR LUNG CANCER: ICD-10-CM

## 2020-08-11 DIAGNOSIS — I10 ESSENTIAL HYPERTENSION: Primary | ICD-10-CM

## 2020-08-11 DIAGNOSIS — Z12.11 SCREENING FOR COLON CANCER: ICD-10-CM

## 2020-08-11 DIAGNOSIS — Z23 ENCOUNTER FOR IMMUNIZATION: ICD-10-CM

## 2020-08-11 DIAGNOSIS — Z11.59 NEED FOR HEPATITIS C SCREENING TEST: ICD-10-CM

## 2020-08-11 DIAGNOSIS — E78.5 DYSLIPIDEMIA: ICD-10-CM

## 2020-08-11 DIAGNOSIS — R73.9 HYPERGLYCEMIA: ICD-10-CM

## 2020-08-11 PROCEDURE — 1036F TOBACCO NON-USER: CPT | Performed by: FAMILY MEDICINE

## 2020-08-11 PROCEDURE — 3008F BODY MASS INDEX DOCD: CPT | Performed by: FAMILY MEDICINE

## 2020-08-11 PROCEDURE — 3078F DIAST BP <80 MM HG: CPT | Performed by: FAMILY MEDICINE

## 2020-08-11 PROCEDURE — 99396 PREV VISIT EST AGE 40-64: CPT | Performed by: FAMILY MEDICINE

## 2020-08-11 PROCEDURE — 99214 OFFICE O/P EST MOD 30 MIN: CPT | Performed by: FAMILY MEDICINE

## 2020-08-11 PROCEDURE — 3075F SYST BP GE 130 - 139MM HG: CPT | Performed by: FAMILY MEDICINE

## 2020-08-11 PROCEDURE — 3725F SCREEN DEPRESSION PERFORMED: CPT | Performed by: FAMILY MEDICINE

## 2020-08-11 RX ORDER — CARVEDILOL PHOSPHATE 20 MG/1
20 CAPSULE, EXTENDED RELEASE ORAL DAILY
Qty: 90 CAPSULE | Refills: 1 | Status: SHIPPED | OUTPATIENT
Start: 2020-08-11 | End: 2021-03-02 | Stop reason: SDUPTHER

## 2020-08-11 NOTE — PROGRESS NOTES
Assessment/Plan:       Diagnoses and all orders for this visit:    Physical exam, annual  -     Comprehensive metabolic panel; Future  -     Lipid Panel with Direct LDL reflex; Future  -     Hemoglobin A1C; Future  82-year-old male presenting for a physical exam   Check labs  BMI Counseling: Body mass index is 28 61 kg/m²  The BMI is above normal  Nutrition recommendations include decreasing overall calorie intake  Exercise recommendations include moderate aerobic physical activity for 150 minutes/week  Need for hepatitis C screening test  -     Hepatitis C antibody; Future  Screening for HIV (human immunodeficiency virus)  -     HIV 1/2 Antigen/Antibody (4th Generation) w Reflex [facility]; Future  Patient did give verbal consent for hep C and HIV screening  Screening for colon cancer  Screening for prostate cancer  Encounter for screening for lung cancer  Benefits and risks of colon, prostate and lung cancer screening discussed  Patient declines to have any form of cancer screening at this time  He states he is fine  I informed him he is at high risk for colon cancer due to his family history of colon cancer in his mother  Patient acknowledged understanding but still will not get a colonoscopy  He will not do any form of colon cancer screening including home stool testing  I informed him he is at high risk for lung cancer due to history of smoking  Patient acknowledged understanding but still will not get a CT scan of the lungs  Due to family history of cancer I recommended PSA screening as well for prostate cancer but patient declined this as well  Encounter for immunization  Patient highly encouraged to get his pneumococcal vaccine and his Tdap vaccine  He states he will not get any vaccines  Follow-up in six months or sooner if needed  Subjective:      Patient ID: Lalo Akins is a 48 y o  male      Patient presents for a physical exam   States he has a regular diet but does not exercise regularly  No current tobacco or drug use  Daily alcohol with dinner  Does not want any form of cancer screening including colon cancer, lung cancer or prostate cancer  Agreeable to other blood work  Does not want any vaccines  No acute concerns  The following portions of the patient's history were reviewed and updated as appropriate: He  has a past medical history of Cardiac disease, CABG, and Stroke ([facility] [address] )  He   Patient Active Problem List    Diagnosis Date Noted    Cerebrovascular disease 08/11/2020    Ear mass, left 07/30/2019    Hyperglycemia 02/21/2018    Long term current use of anticoagulant therapy 01/25/2018    Ischemic cardiomyopathy 10/17/2017    Dyslipidemia 10/17/2017    Essential hypertension 10/10/2017    Coronary artery disease involving native coronary artery of native heart without angina pectoris 10/10/2017    Cerebrovascular accident (CVA) due to embolism of middle cerebral artery ([facility] [address] ) 10/09/2017     He  has a past surgical history that includes Coronary artery bypass graft and Appendectomy  His family history includes Colon cancer in his mother; Hypertension in his father and mother  He  reports that he has quit smoking  He has quit using smokeless tobacco  He reports current alcohol use  He reports previous drug use  Drug: "Crack" cocaine  Current Outpatient Medications   Medication Sig Dispense Refill    aspirin 81 mg chewable tablet Chew 1 tablet daily 30 tablet 0    carvedilol (COREG CR) 20 MG 24 hr capsule Take 1 capsule (20 mg total) by mouth daily 90 capsule 1    warfarin (COUMADIN) 1 mg tablet Take one tablet by mouth every day or as directed  90 tablet 0    warfarin (COUMADIN) 6 mg tablet TAKE 1 TABLET (6 MG TOTAL) BY MOUTH DAILY AS DIRECTED 90 tablet 1     No current facility-administered medications for this visit        Current Outpatient Medications on File Prior to Visit   Medication Sig    aspirin 81 mg chewable tablet Chew 1 tablet daily  warfarin (COUMADIN) 1 mg tablet Take one tablet by mouth every day or as directed   warfarin (COUMADIN) 6 mg tablet TAKE 1 TABLET (6 MG TOTAL) BY MOUTH DAILY AS DIRECTED    [DISCONTINUED] carvedilol (COREG CR) 20 MG 24 hr capsule TAKE 1 CAPSULE BY MOUTH EVERY DAY     No current facility-administered medications on file prior to visit  He is allergic to penicillins       Review of Systems   Constitutional: Negative for fever  Respiratory: Negative for shortness of breath  Cardiovascular: Negative for chest pain  Gastrointestinal: Negative for abdominal pain and blood in stool  Genitourinary: Negative for dysuria and hematuria  Musculoskeletal: Negative for arthralgias and myalgias  Skin: Negative for rash  Neurological: Negative for headaches  Objective:      /70 (BP Location: Left arm, Patient Position: Sitting, Cuff Size: Adult)   Pulse 70   Temp 97 8 °F (36 6 °C) (Tympanic)   Resp 16   Ht 5' 11 5" (1 816 m)   Wt 94 3 kg (208 lb)   BMI 28 61 kg/m²          Physical Exam  Constitutional:       Appearance: He is well-developed  Comments: No nose, mouth or throat complaints  Nose, mouth and throat not examined  Mask in place  COVID-19 pandemic  HENT:      Head: Normocephalic and atraumatic  Right Ear: Tympanic membrane, ear canal and external ear normal       Left Ear: Tympanic membrane, ear canal and external ear normal    Eyes:      General: Lids are normal       Conjunctiva/sclera: Conjunctivae normal       Pupils: Pupils are equal, round, and reactive to light  Neck:      Trachea: Trachea normal    Cardiovascular:      Rate and Rhythm: Normal rate and regular rhythm  Heart sounds: S1 normal and S2 normal  No murmur  Pulmonary:      Effort: Pulmonary effort is normal       Breath sounds: Normal breath sounds  Abdominal:      General: Bowel sounds are normal       Palpations: Abdomen is soft  Tenderness: There is no abdominal tenderness  Musculoskeletal: Normal range of motion  Skin:     General: Skin is warm and dry  Neurological:      Mental Status: He is alert and oriented to person, place, and time  Psychiatric:         Speech: Speech normal          Behavior: Behavior normal          Thought Content:  Thought content normal

## 2020-08-11 NOTE — PROGRESS NOTES
Assessment/Plan:       Diagnoses and all orders for this visit:    Essential hypertension  -     Comprehensive metabolic panel; Future  -     Microalbumin / creatinine urine ratio  -     UA (URINE) with reflex to Scope  -     carvedilol (COREG CR) 20 MG 24 hr capsule; Take 1 capsule (20 mg total) by mouth daily  Blood pressure is controlled  Continue carvedilol  Check labs to assess renal function  Dyslipidemia  -     Comprehensive metabolic panel; Future  -     Lipid Panel with Direct LDL reflex; Future  Patient highly encouraged to start medication not only for lipid lowering but to reduce his risk of cardiovascular event  I recommended he try Zetia  Potential side effects of Zetia discussed  Patient is refuses to take any additional medication at this time for his lipids  He wants to know what he can take over-the-counter and I recommended he try red yeast rice  Cerebrovascular disease  -     Comprehensive metabolic panel; Future  Known cerebrovascular disease with history of stroke  Patient encouraged to continue his Coumadin and aspirin  Statin therapy highly recommended as well but patient declined  Zetia recommended but patient declined  I recommended he try over-the-counter red yeast rice  Hyperglycemia  -     Hemoglobin A1C; Future  Patient at risk for diabetes  Healthy diet and regular exercise as recommended  Check A1c  Follow-up in six months or sooner if needed  Subjective:      Patient ID: Katherine Alamo is a 48 y o  male  Patient presents for follow-up  He has hypertension  Currently on carvedilol  Tolerating medication well  He has hyperlipidemia  Not on any medication at this time  Has been on statin medication with bad side effects  Refuses to take any medication for his lipids  He has cerebrovascular disease  Has history of stroke  Currently on Coumadin and aspirin daily  Does not take a statin as discussed above  He has hyperglycemia    States he eats a regular diet but does not exercise regularly  Not on any medications at this time  The following portions of the patient's history were reviewed and updated as appropriate: He  has a past medical history of Cardiac disease, CABG, and Stroke ([facility] [address] )  He   Patient Active Problem List    Diagnosis Date Noted    Cerebrovascular disease 08/11/2020    Ear mass, left 07/30/2019    Hyperglycemia 02/21/2018    Long term current use of anticoagulant therapy 01/25/2018    Ischemic cardiomyopathy 10/17/2017    Dyslipidemia 10/17/2017    Essential hypertension 10/10/2017    Coronary artery disease involving native coronary artery of native heart without angina pectoris 10/10/2017    Cerebrovascular accident (CVA) due to embolism of middle cerebral artery ([facility] [address] ) 10/09/2017     He  has a past surgical history that includes Coronary artery bypass graft and Appendectomy  His family history includes Colon cancer in his mother; Hypertension in his father and mother  He  reports that he has quit smoking  He has quit using smokeless tobacco  He reports current alcohol use  He reports previous drug use  Drug: "Crack" cocaine  Current Outpatient Medications   Medication Sig Dispense Refill    aspirin 81 mg chewable tablet Chew 1 tablet daily 30 tablet 0    carvedilol (COREG CR) 20 MG 24 hr capsule Take 1 capsule (20 mg total) by mouth daily 90 capsule 1    warfarin (COUMADIN) 1 mg tablet Take one tablet by mouth every day or as directed  90 tablet 0    warfarin (COUMADIN) 6 mg tablet TAKE 1 TABLET (6 MG TOTAL) BY MOUTH DAILY AS DIRECTED 90 tablet 1     No current facility-administered medications for this visit  Current Outpatient Medications on File Prior to Visit   Medication Sig    aspirin 81 mg chewable tablet Chew 1 tablet daily    warfarin (COUMADIN) 1 mg tablet Take one tablet by mouth every day or as directed      warfarin (COUMADIN) 6 mg tablet TAKE 1 TABLET (6 MG TOTAL) BY MOUTH DAILY AS DIRECTED    [DISCONTINUED] carvedilol (COREG CR) 20 MG 24 hr capsule TAKE 1 CAPSULE BY MOUTH EVERY DAY     No current facility-administered medications on file prior to visit  He is allergic to penicillins       Review of Systems   Constitutional: Negative for fever  Respiratory: Negative for shortness of breath  Cardiovascular: Negative for chest pain  Gastrointestinal: Negative for abdominal pain and blood in stool  Genitourinary: Negative for dysuria and hematuria  Musculoskeletal: Negative for arthralgias and myalgias  Skin: Negative for rash  Neurological: Negative for headaches  Objective:      /70 (BP Location: Left arm, Patient Position: Sitting, Cuff Size: Adult)   Pulse 70   Temp 97 8 °F (36 6 °C) (Tympanic)   Resp 16   Ht 5' 11 5" (1 816 m)   Wt 94 3 kg (208 lb)   BMI 28 61 kg/m²          Physical Exam  Constitutional:       Appearance: He is well-developed  Comments: No nose, mouth or throat complaints  Nose, mouth and throat not examined  Mask in place  COVID-19 pandemic  HENT:      Head: Normocephalic and atraumatic  Right Ear: Tympanic membrane, ear canal and external ear normal       Left Ear: Tympanic membrane, ear canal and external ear normal    Eyes:      General: Lids are normal       Conjunctiva/sclera: Conjunctivae normal       Pupils: Pupils are equal, round, and reactive to light  Neck:      Trachea: Trachea normal    Cardiovascular:      Rate and Rhythm: Normal rate and regular rhythm  Heart sounds: S1 normal and S2 normal  No murmur  Pulmonary:      Effort: Pulmonary effort is normal       Breath sounds: Normal breath sounds  Abdominal:      General: Bowel sounds are normal       Palpations: Abdomen is soft  Tenderness: There is no abdominal tenderness  Musculoskeletal: Normal range of motion  Skin:     General: Skin is warm and dry     Neurological:      Mental Status: He is alert and oriented to person, place, and time    Psychiatric:         Speech: Speech normal          Behavior: Behavior normal          Thought Content:  Thought content normal

## 2020-08-26 DIAGNOSIS — Z79.01 LONG TERM CURRENT USE OF ANTICOAGULANT THERAPY: ICD-10-CM

## 2020-08-26 DIAGNOSIS — I63.9 CEREBROVASCULAR ACCIDENT (CVA), UNSPECIFIED MECHANISM (HCC): ICD-10-CM

## 2020-08-27 ENCOUNTER — TELEPHONE (OUTPATIENT)
Dept: FAMILY MEDICINE CLINIC | Facility: MEDICAL CENTER | Age: 55
End: 2020-08-27

## 2020-08-27 RX ORDER — WARFARIN SODIUM 6 MG/1
6 TABLET ORAL DAILY
Qty: 90 TABLET | Refills: 1 | Status: SHIPPED | OUTPATIENT
Start: 2020-08-27 | End: 2021-04-03

## 2020-08-27 RX ORDER — WARFARIN SODIUM 1 MG/1
TABLET ORAL
Qty: 90 TABLET | Refills: 0 | Status: SHIPPED | OUTPATIENT
Start: 2020-08-27 | End: 2021-07-02 | Stop reason: SDUPTHER

## 2020-08-30 LAB
INR PPP: 2.9
PROTHROMBIN TIME: 27.3 SEC (ref 9–11.5)

## 2020-08-31 ENCOUNTER — ANTICOAG VISIT (OUTPATIENT)
Dept: FAMILY MEDICINE CLINIC | Facility: MEDICAL CENTER | Age: 55
End: 2020-08-31

## 2020-08-31 LAB
ALBUMIN SERPL-MCNC: 4.2 G/DL (ref 3.6–5.1)
ALBUMIN/CREAT UR: 4 MCG/MG CREAT
ALBUMIN/GLOB SERPL: 1.8 (CALC) (ref 1–2.5)
ALP SERPL-CCNC: 76 U/L (ref 35–144)
ALT SERPL-CCNC: 13 U/L (ref 9–46)
APPEARANCE UR: CLEAR
AST SERPL-CCNC: 14 U/L (ref 10–35)
BILIRUB SERPL-MCNC: 0.4 MG/DL (ref 0.2–1.2)
BILIRUB UR QL STRIP: NEGATIVE
BUN SERPL-MCNC: 16 MG/DL (ref 7–25)
BUN/CREAT SERPL: ABNORMAL (CALC) (ref 6–22)
CALCIUM SERPL-MCNC: 8.8 MG/DL (ref 8.6–10.3)
CHLORIDE SERPL-SCNC: 107 MMOL/L (ref 98–110)
CHOLEST SERPL-MCNC: 244 MG/DL
CHOLEST/HDLC SERPL: 6.8 (CALC)
CO2 SERPL-SCNC: 26 MMOL/L (ref 20–32)
COLOR UR: YELLOW
CREAT SERPL-MCNC: 0.83 MG/DL (ref 0.7–1.33)
CREAT UR-MCNC: 131 MG/DL (ref 20–320)
GLOBULIN SER CALC-MCNC: 2.4 G/DL (CALC) (ref 1.9–3.7)
GLUCOSE SERPL-MCNC: 106 MG/DL (ref 65–99)
GLUCOSE UR QL STRIP: NEGATIVE
HBA1C MFR BLD: 5.8 % OF TOTAL HGB
HCV AB S/CO SERPL IA: 0.17
HCV AB SERPL QL IA: NORMAL
HDLC SERPL-MCNC: 36 MG/DL
HGB UR QL STRIP: NEGATIVE
HIV 1+2 AB+HIV1 P24 AG SERPL QL IA: NORMAL
KETONES UR QL STRIP: NEGATIVE
LDLC SERPL CALC-MCNC: 170 MG/DL (CALC)
LEUKOCYTE ESTERASE UR QL STRIP: NEGATIVE
MICROALBUMIN UR-MCNC: 0.5 MG/DL
NITRITE UR QL STRIP: NEGATIVE
NONHDLC SERPL-MCNC: 208 MG/DL (CALC)
PH UR STRIP: 5.5 [PH] (ref 5–8)
POTASSIUM SERPL-SCNC: 4.6 MMOL/L (ref 3.5–5.3)
PROT SERPL-MCNC: 6.6 G/DL (ref 6.1–8.1)
PROT UR QL STRIP: NEGATIVE
SL AMB EGFR AFRICAN AMERICAN: 117 ML/MIN/1.73M2
SL AMB EGFR NON AFRICAN AMERICAN: 101 ML/MIN/1.73M2
SODIUM SERPL-SCNC: 140 MMOL/L (ref 135–146)
SP GR UR STRIP: 1.02 (ref 1–1.03)
TRIGL SERPL-MCNC: 222 MG/DL

## 2020-09-01 NOTE — TELEPHONE ENCOUNTER
----- Message from Viviane Lai DO sent at 10/14/2019 10:49 AM EDT -----  INR out of range  Has patient changed anything in his medication dosing of Coumadin including receiving tablets that looked different than previous tablets or has patient changed his diet in any way?
Have him go for an INR today or tomorrow  If still high I will change the dose 
Per wife there are no changes  No new medication, he is taking 6mg daily except Tuesday and Thursday he takes 7mg   To call wife back at [phone]
S/w wife -aware    Will try to go in the morning
master
Yes...

## 2020-09-02 ENCOUNTER — TELEPHONE (OUTPATIENT)
Dept: FAMILY MEDICINE CLINIC | Facility: MEDICAL CENTER | Age: 55
End: 2020-09-02

## 2020-09-02 NOTE — TELEPHONE ENCOUNTER
----- Message from Holland Barajas DO sent at 9/1/2020  4:04 PM EDT -----  Lipids remain elevated  Patient should take a lipid lowering medication  He has declined to do this in the past   If he is agreeable I will fax in medication  A1c is elevated and is in the prediabetic range  Diet and exercise to help prevent diabetes  Remainder of labs including urine test unremarkable

## 2020-09-02 NOTE — TELEPHONE ENCOUNTER
S/w wife   Sates he has tried statins in the past and gets side effects  If he changes his mind and wants to try again she will have him call the office back   Aware if other test results

## 2020-09-27 LAB
INR PPP: 2.7
PROTHROMBIN TIME: 25.5 SEC (ref 9–11.5)

## 2020-09-28 ENCOUNTER — ANTICOAG VISIT (OUTPATIENT)
Dept: FAMILY MEDICINE CLINIC | Facility: MEDICAL CENTER | Age: 55
End: 2020-09-28

## 2020-09-28 ENCOUNTER — TELEPHONE (OUTPATIENT)
Dept: FAMILY MEDICINE CLINIC | Facility: MEDICAL CENTER | Age: 55
End: 2020-09-28

## 2020-09-28 NOTE — TELEPHONE ENCOUNTER
----- Message from Luli Henson DO sent at 9/28/2020 10:00 AM EDT -----  INR in range  Continue same dosing for Coumadin

## 2020-11-02 ENCOUNTER — TELEPHONE (OUTPATIENT)
Dept: FAMILY MEDICINE CLINIC | Facility: MEDICAL CENTER | Age: 55
End: 2020-11-02

## 2020-11-03 ENCOUNTER — TELEPHONE (OUTPATIENT)
Dept: FAMILY MEDICINE CLINIC | Facility: MEDICAL CENTER | Age: 55
End: 2020-11-03

## 2020-11-03 DIAGNOSIS — Z79.01 LONG TERM CURRENT USE OF ANTICOAGULANT THERAPY: ICD-10-CM

## 2020-11-03 DIAGNOSIS — I63.412 CEREBROVASCULAR ACCIDENT (CVA) DUE TO EMBOLISM OF LEFT MIDDLE CEREBRAL ARTERY (HCC): Primary | ICD-10-CM

## 2020-11-19 ENCOUNTER — TELEPHONE (OUTPATIENT)
Dept: FAMILY MEDICINE CLINIC | Facility: MEDICAL CENTER | Age: 55
End: 2020-11-19

## 2020-11-20 LAB
INR PPP: 2.4
PROTHROMBIN TIME: 23.5 SEC (ref 9–11.5)

## 2020-11-23 ENCOUNTER — ANTICOAG VISIT (OUTPATIENT)
Dept: FAMILY MEDICINE CLINIC | Facility: MEDICAL CENTER | Age: 55
End: 2020-11-23

## 2020-12-28 LAB
INR PPP: 3.1
PROTHROMBIN TIME: 30.1 SEC (ref 9–11.5)

## 2020-12-29 ENCOUNTER — TELEPHONE (OUTPATIENT)
Dept: FAMILY MEDICINE CLINIC | Facility: MEDICAL CENTER | Age: 55
End: 2020-12-29

## 2020-12-29 ENCOUNTER — ANTICOAG VISIT (OUTPATIENT)
Dept: FAMILY MEDICINE CLINIC | Facility: MEDICAL CENTER | Age: 55
End: 2020-12-29

## 2021-01-17 LAB
INR PPP: 2.6
PROTHROMBIN TIME: 25.7 SEC (ref 9–11.5)

## 2021-01-18 ENCOUNTER — ANTICOAG VISIT (OUTPATIENT)
Dept: FAMILY MEDICINE CLINIC | Facility: MEDICAL CENTER | Age: 56
End: 2021-01-18

## 2021-01-18 ENCOUNTER — TELEPHONE (OUTPATIENT)
Dept: FAMILY MEDICINE CLINIC | Facility: MEDICAL CENTER | Age: 56
End: 2021-01-18

## 2021-02-21 LAB
INR PPP: 2.7
PROTHROMBIN TIME: 26.3 SEC (ref 9–11.5)

## 2021-02-22 ENCOUNTER — ANTICOAG VISIT (OUTPATIENT)
Dept: FAMILY MEDICINE CLINIC | Facility: MEDICAL CENTER | Age: 56
End: 2021-02-22

## 2021-03-02 DIAGNOSIS — I10 ESSENTIAL HYPERTENSION: ICD-10-CM

## 2021-03-03 RX ORDER — CARVEDILOL PHOSPHATE 20 MG/1
20 CAPSULE, EXTENDED RELEASE ORAL DAILY
Qty: 30 CAPSULE | Refills: 1 | Status: SHIPPED | OUTPATIENT
Start: 2021-03-03 | End: 2021-04-27 | Stop reason: SDUPTHER

## 2021-03-27 DIAGNOSIS — I10 ESSENTIAL HYPERTENSION: ICD-10-CM

## 2021-03-28 RX ORDER — CARVEDILOL PHOSPHATE 20 MG/1
CAPSULE, EXTENDED RELEASE ORAL
Qty: 30 CAPSULE | Refills: 1 | OUTPATIENT
Start: 2021-03-28

## 2021-04-01 DIAGNOSIS — I63.9 CEREBROVASCULAR ACCIDENT (CVA), UNSPECIFIED MECHANISM (HCC): ICD-10-CM

## 2021-04-03 RX ORDER — WARFARIN SODIUM 6 MG/1
TABLET ORAL
Qty: 90 TABLET | Refills: 0 | Status: SHIPPED | OUTPATIENT
Start: 2021-04-03 | End: 2021-06-28 | Stop reason: SDUPTHER

## 2021-04-04 LAB
INR PPP: 2.3
PROTHROMBIN TIME: 22.4 SEC (ref 9–11.5)

## 2021-04-05 ENCOUNTER — ANTICOAG VISIT (OUTPATIENT)
Dept: FAMILY MEDICINE CLINIC | Facility: MEDICAL CENTER | Age: 56
End: 2021-04-05

## 2021-04-06 DIAGNOSIS — I63.412 CEREBROVASCULAR ACCIDENT (CVA) DUE TO EMBOLISM OF LEFT MIDDLE CEREBRAL ARTERY (HCC): Primary | ICD-10-CM

## 2021-04-06 DIAGNOSIS — Z79.01 LONG TERM CURRENT USE OF ANTICOAGULANT THERAPY: ICD-10-CM

## 2021-04-27 ENCOUNTER — OFFICE VISIT (OUTPATIENT)
Dept: FAMILY MEDICINE CLINIC | Facility: MEDICAL CENTER | Age: 56
End: 2021-04-27
Payer: COMMERCIAL

## 2021-04-27 VITALS
DIASTOLIC BLOOD PRESSURE: 90 MMHG | BODY MASS INDEX: 28.44 KG/M2 | HEIGHT: 72 IN | TEMPERATURE: 98.2 F | SYSTOLIC BLOOD PRESSURE: 150 MMHG | WEIGHT: 210 LBS | RESPIRATION RATE: 16 BRPM | HEART RATE: 68 BPM

## 2021-04-27 DIAGNOSIS — I67.9 CEREBROVASCULAR DISEASE: ICD-10-CM

## 2021-04-27 DIAGNOSIS — I10 ESSENTIAL HYPERTENSION: ICD-10-CM

## 2021-04-27 DIAGNOSIS — R73.9 HYPERGLYCEMIA: ICD-10-CM

## 2021-04-27 DIAGNOSIS — Z79.01 LONG TERM CURRENT USE OF ANTICOAGULANT THERAPY: ICD-10-CM

## 2021-04-27 DIAGNOSIS — I10 ESSENTIAL HYPERTENSION: Primary | ICD-10-CM

## 2021-04-27 DIAGNOSIS — E78.5 DYSLIPIDEMIA: ICD-10-CM

## 2021-04-27 LAB — SL AMB POCT HEMOGLOBIN AIC: 5.5 (ref ?–6.5)

## 2021-04-27 PROCEDURE — 83036 HEMOGLOBIN GLYCOSYLATED A1C: CPT | Performed by: FAMILY MEDICINE

## 2021-04-27 PROCEDURE — 1036F TOBACCO NON-USER: CPT | Performed by: FAMILY MEDICINE

## 2021-04-27 PROCEDURE — 99214 OFFICE O/P EST MOD 30 MIN: CPT | Performed by: FAMILY MEDICINE

## 2021-04-27 PROCEDURE — 3725F SCREEN DEPRESSION PERFORMED: CPT | Performed by: FAMILY MEDICINE

## 2021-04-27 PROCEDURE — 3008F BODY MASS INDEX DOCD: CPT | Performed by: FAMILY MEDICINE

## 2021-04-27 RX ORDER — CARVEDILOL PHOSPHATE 40 MG/1
40 CAPSULE, EXTENDED RELEASE ORAL DAILY
Qty: 30 CAPSULE | Refills: 1 | Status: SHIPPED | OUTPATIENT
Start: 2021-04-27 | End: 2021-06-20

## 2021-04-27 NOTE — PROGRESS NOTES
Assessment/Plan:       Diagnoses and all orders for this visit:    Essential hypertension  -     carvedilol (COREG CR) 40 MG 24 hr capsule; Take 1 capsule (40 mg total) by mouth daily  Blood pressure not at goal   Increase carvedilol to 40 mg daily  I have asked patient to monitor his blood pressure at home and call next week with values  I have also asked him to call the office if his episodes of lightheadedness start to worsen with the increase in dose  I did inform patient that he will not be able to stop his blood pressure medication at this time  Dyslipidemia  Lipid lowering medication highly recommended not only to lower lipids but to lower cardiovascular risk but patient declines  He will not take any additional medication for this  Cerebrovascular disease  Stable  Continue aspirin and Coumadin  Patient is requesting to not have to go for his INR every month  He would like to extend that out  I informed him that he can go every six weeks for INR checks  He is to call the office however if he starts having any abnormal bleeding and to go for lab draw right away  Hyperglycemia  -     POCT hemoglobin A1c  In office A1c today was 5 5%  Hyperglycemia stable  Long term current use of anticoagulant therapy  -     Protime-INR; Standing  -     Protime-INR  New order placed so patient may have his INR drawn at [address]       Age-appropriate vaccinations recommended but patient declined  Colon cancer screening recommended and various options discussed  Patient refuses to have any form of colon cancer screening  BMI Counseling: Body mass index is 28 88 kg/m²  The BMI is above normal  Nutrition recommendations include decreasing overall calorie intake  Exercise recommendations include moderate aerobic physical activity for 150 minutes/week  Follow-up in about four months or sooner if needed  Subjective:      Patient ID: Denise Peña is a 50 y o  male      Patient presents for follow-up  He has hypertension  Current treatment includes carvedilol extended release 20 mg daily  Overall he is tolerating the medication well  He did have a few episodes in the past of lightheadedness  Nothing lately  Patient tells me he would like to stop taking the medication  He has dyslipidemia  Not on any medication at this time  Refuses to take any cholesterol-lowering medication due to previous ill affects from medication  He has cerebrovascular disease  Currently on aspirin and Coumadin  Tolerating medication well  No adverse bleeding  He has hyperglycemia  Not on any medication  Trying to eat healthy to keep his glucose controlled  He is on chronic anticoagulation  He tells me he needs a new order to get his INR checked as he goes to [facility]       The following portions of the patient's history were reviewed and updated as appropriate: He  has a past medical history of Cardiac disease, CABG, and Stroke ([facility] [address] )  He   Patient Active Problem List    Diagnosis Date Noted    Cerebrovascular disease 08/11/2020    Ear mass, left 07/30/2019    Hyperglycemia 02/21/2018    Long term current use of anticoagulant therapy 01/25/2018    Ischemic cardiomyopathy 10/17/2017    Dyslipidemia 10/17/2017    Essential hypertension 10/10/2017    Coronary artery disease involving native coronary artery of native heart without angina pectoris 10/10/2017    Cerebrovascular accident (CVA) due to embolism of middle cerebral artery ([facility] [address] ) 10/09/2017     He  has a past surgical history that includes Coronary artery bypass graft and Appendectomy  His family history includes Colon cancer in his mother; Hypertension in his father and mother  He  reports that he has quit smoking  He has quit using smokeless tobacco  He reports current alcohol use  He reports previous drug use  Drug: "Crack" cocaine    Current Outpatient Medications   Medication Sig Dispense Refill    aspirin 81 mg chewable tablet Chew 1 tablet daily 30 tablet 0    carvedilol (COREG CR) 40 MG 24 hr capsule Take 1 capsule (40 mg total) by mouth daily 30 capsule 1    Jantoven 6 MG tablet TAKE ONE TABLET BY MOUTH ONCE DAILY as directed 90 tablet 0    warfarin (COUMADIN) 1 mg tablet Take one tablet by mouth every day or as directed  90 tablet 0     No current facility-administered medications for this visit  Current Outpatient Medications on File Prior to Visit   Medication Sig    aspirin 81 mg chewable tablet Chew 1 tablet daily    Jantoven 6 MG tablet TAKE ONE TABLET BY MOUTH ONCE DAILY as directed    warfarin (COUMADIN) 1 mg tablet Take one tablet by mouth every day or as directed   [DISCONTINUED] carvedilol (COREG CR) 20 MG 24 hr capsule Take 1 capsule (20 mg total) by mouth daily     No current facility-administered medications on file prior to visit  He is allergic to penicillins       Review of Systems   Constitutional: Negative for fever  Respiratory: Negative for shortness of breath  Cardiovascular: Negative for chest pain  Objective:      /90 (BP Location: Left arm, Patient Position: Sitting, Cuff Size: Large)   Pulse 68   Temp 98 2 °F (36 8 °C)   Resp 16   Ht 5' 11 5" (1 816 m)   Wt 95 3 kg (210 lb)   BMI 28 88 kg/m²          Physical Exam  Constitutional:       General: He is not in acute distress  Appearance: He is not ill-appearing  Cardiovascular:      Rate and Rhythm: Normal rate and regular rhythm  Heart sounds: Normal heart sounds  Pulmonary:      Effort: Pulmonary effort is normal       Breath sounds: Normal breath sounds

## 2021-04-28 RX ORDER — CARVEDILOL PHOSPHATE 20 MG/1
CAPSULE, EXTENDED RELEASE ORAL
Qty: 30 CAPSULE | Refills: 1 | OUTPATIENT
Start: 2021-04-28

## 2021-04-30 ENCOUNTER — TELEPHONE (OUTPATIENT)
Dept: FAMILY MEDICINE CLINIC | Facility: MEDICAL CENTER | Age: 56
End: 2021-04-30

## 2021-04-30 NOTE — TELEPHONE ENCOUNTER
I spoke with the pharmacy  I question why Coreg extended release 40 mg was not covered as patient was previously on Coreg extended release 20 mg  The pharmacist tells me that it appears patient was using a good Rx coupon for the 20 mg as that dose is not covered either  I recommend they continue using good Rx to refill the medication

## 2021-04-30 NOTE — TELEPHONE ENCOUNTER
Pt's wife called to request a change in medication  When they went to  the carvedilol, the pharmacy said the extended release would not be covered by his insurance  The insurance will only cover immediate release tablets  Can Dr Nigel Elam please send a new Rx? Pt's wife said she needs to be able to pick it up by Sunday      Routed to Dr Nigel Elam

## 2021-05-05 NOTE — TELEPHONE ENCOUNTER
Wife Ida Alva called to say that she paid for the Coreg ER 40 mg with the good rx card, but even with that it was $150 for a 30 day supply, and she said they can't afford that  She also gave his bp readings for the past week        4//91  4//78  4//78  5/1-145/81  5/2-151/84  5/3-136/84  0/2-113/77  5/5-133/82

## 2021-05-05 NOTE — TELEPHONE ENCOUNTER
Due to patient's rather labile blood pressure and extensive cardiac history I would like to get him in with Cardiology this month so they may assist with antihypertensive treatment  If patient is agreeable to this please let me know and we can work on getting patient an appointment with Cardiology  I suspect he may want an evening appointment which may not be possible with Cardiology therefore he will have to accept what ever appointment we can get him

## 2021-05-06 NOTE — TELEPHONE ENCOUNTER
leonor-S/w cardiology-appt June 2nd with Dr Se Freeman at [place] at 3:40 pm  Arianna montilla   [facility] appt

## 2021-05-06 NOTE — TELEPHONE ENCOUNTER
Please call Cardiology to see if we get patient in within the next two weeks either here or at [address]

## 2021-05-06 NOTE — TELEPHONE ENCOUNTER
S/w wife- agreeable to setting up appt with Cardiology  She will talk to Archer Gitelman about it  Use to see Dr Samm Nunes, prefer the [place] office if possible , otherwise will go to [facility] [address] if needed

## 2021-05-09 LAB
INR PPP: 2.9
PROTHROMBIN TIME: 27.7 SEC (ref 9–11.5)

## 2021-05-10 ENCOUNTER — ANTICOAG VISIT (OUTPATIENT)
Dept: FAMILY MEDICINE CLINIC | Facility: MEDICAL CENTER | Age: 56
End: 2021-05-10

## 2021-06-02 ENCOUNTER — OFFICE VISIT (OUTPATIENT)
Dept: CARDIOLOGY CLINIC | Facility: CLINIC | Age: 56
End: 2021-06-02
Payer: COMMERCIAL

## 2021-06-02 VITALS
SYSTOLIC BLOOD PRESSURE: 152 MMHG | WEIGHT: 217 LBS | HEIGHT: 72 IN | BODY MASS INDEX: 29.39 KG/M2 | DIASTOLIC BLOOD PRESSURE: 80 MMHG | OXYGEN SATURATION: 97 % | HEART RATE: 82 BPM

## 2021-06-02 DIAGNOSIS — E78.5 DYSLIPIDEMIA: ICD-10-CM

## 2021-06-02 DIAGNOSIS — I63.412 CEREBROVASCULAR ACCIDENT (CVA) DUE TO EMBOLISM OF LEFT MIDDLE CEREBRAL ARTERY (HCC): ICD-10-CM

## 2021-06-02 DIAGNOSIS — I25.10 CORONARY ARTERY DISEASE INVOLVING NATIVE CORONARY ARTERY OF NATIVE HEART WITHOUT ANGINA PECTORIS: Primary | ICD-10-CM

## 2021-06-02 DIAGNOSIS — I10 ESSENTIAL HYPERTENSION: ICD-10-CM

## 2021-06-02 DIAGNOSIS — I25.5 ISCHEMIC CARDIOMYOPATHY: ICD-10-CM

## 2021-06-02 DIAGNOSIS — Z79.01 LONG TERM CURRENT USE OF ANTICOAGULANT THERAPY: ICD-10-CM

## 2021-06-02 PROCEDURE — 3079F DIAST BP 80-89 MM HG: CPT | Performed by: INTERNAL MEDICINE

## 2021-06-02 PROCEDURE — 3077F SYST BP >= 140 MM HG: CPT | Performed by: INTERNAL MEDICINE

## 2021-06-02 PROCEDURE — 99214 OFFICE O/P EST MOD 30 MIN: CPT | Performed by: INTERNAL MEDICINE

## 2021-06-02 PROCEDURE — 93000 ELECTROCARDIOGRAM COMPLETE: CPT | Performed by: INTERNAL MEDICINE

## 2021-06-02 PROCEDURE — 1036F TOBACCO NON-USER: CPT | Performed by: INTERNAL MEDICINE

## 2021-06-02 RX ORDER — LISINOPRIL 10 MG/1
10 TABLET ORAL
Qty: 30 TABLET | Refills: 6 | Status: SHIPPED | OUTPATIENT
Start: 2021-06-02 | End: 2021-11-28

## 2021-06-02 NOTE — ASSESSMENT & PLAN NOTE
Lab Results   Component Value Date    LDLCALC 170 (H) 08/29/2020       Severe dyslipidemia for which he has  Historically declined statins or Zetia

## 2021-06-02 NOTE — PATIENT INSTRUCTIONS
Lisinopril 10mg at night  Get BMP in 1 week in [place] office  Continue carvedilol  Get ECHO in [place] office in end of July  Following healthy lifestyle will help lower your blood pressure:   Increase physical activity  Low-salt diet rich in fruits and vegetables  Avoid alcohol intake  Maintain healthy weight  Do not smoke or use tobacco     Take medications as instructed      Practice meditation and stress reduction         ]

## 2021-06-02 NOTE — PROGRESS NOTES
[facility] [place]  [address]  [address]  [facility] [address]  Phone#  [phone]  Fax#  [phone]  [facility] SPECIAL[facility] Cardiology Office Consultation             NAME: Vaishnavi Barrientos  AGE: 50 y o  SEX: male   : 1965   MRN: [id number]    DATE: 2021  TIME: 4:09 PM    Assessment and plan:    1  Coronary artery disease involving native coronary artery of native heart without angina pectoris  Assessment & Plan:    Severe coronary artery disease status post remote CABG and markedly abnormal nuclear stress test in 2017 suggestive of progression of CAD with moderate ischemic burden for which she declined cardiac catheterization at that point  He has remained relatively stable without interim hospitalizations for unstable angina or heart failure  Currently he is asymptomatic  Does not want further stress testing at this time  Orders:  -     Echo complete with contrast if indicated; Future; Expected date: 2021    2  Essential hypertension  Assessment & Plan:  BP Readings from Last 3 Encounters:   21 152/80   21 150/90   20 134/70       BP is  still mildly elevated here  Home blood pressures are better  I elected to add lisinopril at bedtime  Check BMP in 1 week  Continue current medications  Lifestyle modification including increased physical activity, low-salt diet rich in fruits and vegetables, avoidance of alcohol intake and maintaining healthy weight  Close follow up with Dr Ray Nichols  Orders:  -     POCT ECG  -     lisinopril (ZESTRIL) 10 mg tablet; Take 1 tablet (10 mg total) by mouth daily at bedtime  -     Basic metabolic panel; Future; Expected date: 2021    3  Long term current use of anticoagulant therapy  Assessment & Plan:    Current anticoagulation therapy to lower risk of recurrent stroke  No obvious cardiac source of embolism identified on KRISTINA        4  Cerebrovascular accident (CVA) due to embolism of left middle cerebral artery [facility])  Assessment & Plan: Thromboembolic CVA status post tPA  No intracardiac source identified on KRISTINA  Lifelong anticoagulation  5  Dyslipidemia  Assessment & Plan:  Lab Results   Component Value Date    LDLCALC 170 (H) 08/29/2020       Severe dyslipidemia for which he has  Historically declined statins or Zetia  6  Ischemic cardiomyopathy  Assessment & Plan:    Ischemic cardiomyopathy with chronic compensated systolic heart failure, managed without diuretics  Presently only on a beta-blocker  Previously declined AICD  I have advised the patient to start lisinopril 10 mg at bedtime  Obtain BNP in a week  He requested a repeat echo after 2 months of stable medical therapy to reassess ejection fraction and need for device therapy  Orders:  -     Echo complete with contrast if indicated; Future; Expected date: 09/02/2021             Chief Complaint   Patient presents with    Follow-up     yearly previous Dr Samm Nunes patient, hypertension     Dizziness     occasional     Numbness     tingling in both arms at night since medication change          HPI    79-year-old male with premature coronary artery disease status post remote bypass grafting surgery in 2002, remote PCI, last ischemic evaluation showing a large inferior inferolateral partially reversible defect with EF of 20% on the nuclear stress test for which cardiac catheterization was advised but he declined, subsequently declined AICD evaluation, no follow-up since April of 2020, has been referred here mainly for accelerated hypertension  Patient was previously a patient of Dr Samm Nunes, was last seen in 2020 and is here to establish care with me  Past history, family history, social history, current medications, vital signs, recent lab and imaging studies and  prior cardiology studies reviewed independently on this visit   Prior extensive medical records, nuclear stress testing, referring physician office notes reviewed and findings discussed with the patient  Currently he is only on low-dose aspirin, warfarin and a beta-blocker  He has severe dyslipidemia but historically has declined statins due to concerns of side effects  He reports a history of thrombotic occlusion of the M3 division of the left MCA with resultant stroke and aphasia for which he was given tPA in 2017  Echo at the same time showed EF of 20% with dilated right side, dilated left atrium, severe diffuse hypokinesis  No PFO was seen  No thrombus in the atrium or the appendage was seen  He has not had a follow-up echo in the last several years  Denies any heart failure symptoms or hospitalizations  He brought his blood pressure readings  For the last 10 days his systolic blood pressure is between 130 and 145  Diastolic blood pressure is between 70 and 85  Reports compliance to low-sodium diet  He reports numbness in both the arms, this is intermittent  He states is worsened after he began his medications  I feel this is more neurological or  Possibly due to carpal tunnel syndrome and less likely from medications  He continues to have poor insight into his medical conditions , or he downplays his symptoms  He reports he is not keen on any further testing due to concerns of several medical bills  Previously he was advised cardiac catheterization could not afford to get this done because  His out of pocket expense would have been over 5000 dollars  Cardiology Problem list:  CAD status post CABG 2002  Ischemic cardiomyopathy:  11/17-severe partially reversible inferior/ inferolateral defect, EF 20%: Catheterization advised  Declined ICD  CVA 10/17- L MCA M3 division thrombus s/p tPA -warfarin started  KRISTINA, no PFO/ASD, atrial thrombus or LV thrombus    EF between 20 and 25%  Hypertension  Dyslipidemia: Declined statins    ALLERGIES:  Allergies   Allergen Reactions    Penicillins          Current Outpatient Medications:     aspirin 81 mg chewable tablet, Chew 1 tablet daily, Disp: 30 tablet, Rfl: 0    carvedilol (COREG CR) 40 MG 24 hr capsule, Take 1 capsule (40 mg total) by mouth daily, Disp: 30 capsule, Rfl: 1    Jantoven 6 MG tablet, TAKE ONE TABLET BY MOUTH ONCE DAILY as directed, Disp: 90 tablet, Rfl: 0    warfarin (COUMADIN) 1 mg tablet, Take one tablet by mouth every day or as directed , Disp: 90 tablet, Rfl: 0    lisinopril (ZESTRIL) 10 mg tablet, Take 1 tablet (10 mg total) by mouth daily at bedtime, Disp: 30 tablet, Rfl: 6      Review of Systems   Constitutional: Negative for activity change, appetite change and unexpected weight change  HENT: Negative for trouble swallowing  Eyes: Negative for visual disturbance  Respiratory: Negative for chest tightness, shortness of breath and wheezing  Cardiovascular: Negative for chest pain, palpitations and leg swelling  Gastrointestinal: Negative for blood in stool  Endocrine: Negative for polyuria  Genitourinary: Negative for hematuria  Musculoskeletal: Negative for arthralgias  Skin: Negative for rash  Neurological: Positive for dizziness and numbness  Negative for weakness  Hematological: Does not bruise/bleed easily  Psychiatric/Behavioral: Negative for behavioral problems         Past Medical History:   Diagnosis Date    Cardiac disease     Hx of CABG     Stroke [facility])        Past Surgical History:   Procedure Laterality Date    APPENDECTOMY      CORONARY ARTERY BYPASS GRAFT         Family History   Problem Relation Age of Onset    Hypertension Mother     Colon cancer Mother     Hypertension Father        Social History     Socioeconomic History    Marital status: /Civil Union     Spouse name: Not on file    Number of children: Not on file    Years of education: Not on file    Highest education level: Not on file   Occupational History    Not on file   Social Needs    Financial resource strain: Not on file    Food insecurity     Worry: Not on file Inability: Not on file    Transportation needs     Medical: Not on file     Non-medical: Not on file   Tobacco Use    Smoking status: Former Smoker    Smokeless tobacco: Former User   Substance and Sexual Activity    Alcohol use: Yes     Comment: daily beer w/ dinner    Drug use: Not Currently     Types: "Crack" cocaine     Comment: Back in the 1980's    Sexual activity: Yes     Partners: Female   Lifestyle    Physical activity     Days per week: Not on file     Minutes per session: Not on file    Stress: Not on file   Relationships    Social connections     Talks on phone: Not on file     Gets together: Not on file     Attends Evangelical service: Not on file     Active member of club or organization: Not on file     Attends meetings of clubs or organizations: Not on file     Relationship status: Not on file    Intimate partner violence     Fear of current or ex partner: Not on file     Emotionally abused: Not on file     Physically abused: Not on file     Forced sexual activity: Not on file   Other Topics Concern    Not on file   Social History Narrative    Not on file         Objective:     Vitals:    06/02/21 1531   BP: 152/80   Pulse: 82   SpO2: 97%     Wt Readings from Last 3 Encounters:   06/02/21 98 4 kg (217 lb)   04/27/21 95 3 kg (210 lb)   08/11/20 94 3 kg (208 lb)     Pulse Readings from Last 3 Encounters:   06/02/21 82   04/27/21 68   08/11/20 70     BP Readings from Last 3 Encounters:   06/02/21 152/80   04/27/21 150/90   08/11/20 134/70         Physical Exam  Constitutional:       General: He is not in acute distress  HENT:      Head: Normocephalic  Eyes:      Conjunctiva/sclera: Conjunctivae normal    Neck:      Vascular: No carotid bruit  Cardiovascular:      Rate and Rhythm: Normal rate and regular rhythm  Heart sounds: S1 normal and S2 normal  Murmur present  Crescendo  systolic murmur present with a grade of 2/6     Pulmonary:      Effort: Pulmonary effort is normal  Breath sounds: Normal breath sounds  Abdominal:      General: Bowel sounds are normal  There is no distension  Musculoskeletal:      Right lower leg: No edema  Left lower leg: No edema  Skin:     General: Skin is warm  Neurological:      General: No focal deficit present  Psychiatric:         Mood and Affect: Mood normal          Pertinent Laboratory/Diagnostic Studies:    Laboratory studies reviewed personally by Jhonatan Donnelly MD    BMP:   Lab Results   Component Value Date    SODIUM 140 2020    K 4 6 2020     2020    CO2 26 2020    BUN 16 2020    CREATININE 0 83 2020    GLUC 106 (H) 2020    CALCIUM 8 8 2020     CBC:  Lab Results   Component Value Date    WBC 9 60 2018    RBC 5 31 2018    HGB 15 3 2018    HCT 45 9 2018    MCV 86 2018    MCH 28 8 2018    RDW 13 9 2018     2018     Coags:    Lipid Profile:   No results found for: CHOL  Lab Results   Component Value Date    HDL 36 (L) 2020     Lab Results   Component Value Date    LDLCALC 170 (H) 2020     Lab Results   Component Value Date    TRIG 222 (H) 2020      No results found for: [id number], TSH  Lab Results   Component Value Date    ALT 13 2020    AST 14 2020         Imaging Studies:   No results found  Cardiac testing:   EKG reviewed personally: normal sinus rhythm, occasional PVC noted, unifocal,   Rightward axis, prior anterior infarction and prior inferior infarction, age indeterminate      Results for orders placed during the hospital encounter of 10/09/17   Echo complete with contrast if indicated    Narrative [facility]  [address]  [phone]    Transthoracic Echocardiogram  2D, M-mode, Doppler, and Color Doppler    Study date:  10-Oct-2017    Patient: Neha Mcnair  MR number: [id number]  Account number: [de-identified]  : 1965  Age: 50 years  Gender: Male  Status: Inpatient  Location: Bedside  Height: 71 in  Weight: 218 lb  BP: 115/ 93 mmHg    Indications: CVA    Diagnoses: I63 9 - Cerebral infarction, unspecified    Sonographer:  MELODIE Mondragon  Primary Physician:  Cierra Dial MD  Referring Physician:  Kim Jacobs DO  Group:  [id number]  Cardiology Fellow:  Camron Crouch MD  Interpreting Physician:  Srinivasa Burrell DO    IMPRESSIONS:  Although no cardiac source of embolism was identified, the possibility cannot be ruled out on the basis of this study  SUMMARY    PROCEDURE INFORMATION:  Intravenous contrast (Definity solution [1 3 ml Definity/8 7ml normal saline solution], 4 ml) was administered to opacify the left ventricle  LEFT VENTRICLE:  The ventricle was moderately dilated  Systolic function was severely reduced  Ejection fraction was estimated to be 20 %  There was severe diffuse hypokinesis  There was mild concentric hypertrophy  Diastolic dysfunction with normal LV filling pressures  No evidence of apical thrombus  RIGHT VENTRICLE:  The ventricle was mildly dilated  Systolic function was mildly reduced  LEFT ATRIUM:  The atrium was moderately dilated  MITRAL VALVE:  There was mild regurgitation  AORTIC VALVE:  The valve was not visualized well enough to rule out a bicuspid morphology  Leaflets exhibited normal thickness and normal cuspal separation  RECOMMENDATIONS:  If clinically indicated, transesophageal echocardiography could provide additional information  HISTORY: PRIOR HISTORY: CABG    PROCEDURE: The procedure was performed at the bedside  This was a routine study  The transthoracic approach was used  The study included complete 2D imaging, M-mode, complete spectral Doppler, and color Doppler  The heart rate was 91 bpm,  at the start of the study   Images were obtained from the parasternal, apical, subcostal, and suprasternal notch acoustic windows  Intravenous contrast (Definity solution [1 3 ml Definity/8 7ml normal saline solution], 4 ml) was  administered to opacify the left ventricle  Echocardiographic views were limited due to lung interference  Image quality was adequate  LEFT VENTRICLE: The ventricle was moderately dilated  Systolic function was severely reduced  Ejection fraction was estimated to be 20 %  There was severe diffuse hypokinesis  Wall thickness was mildly increased  There was mild concentric  hypertrophy  No evidence of apical thrombus  DOPPLER: Diastolic dysfunction with normal LV filling pressures  RIGHT VENTRICLE: The ventricle was mildly dilated  Systolic function was mildly reduced  Wall thickness was normal     LEFT ATRIUM: The atrium was moderately dilated  RIGHT ATRIUM: Size was at the upper limits of normal     MITRAL VALVE: Valve structure was normal  There was normal leaflet separation  DOPPLER: The transmitral velocity was within the normal range  There was no evidence for stenosis  There was mild regurgitation  AORTIC VALVE: The valve was not visualized well enough to rule out a bicuspid morphology  Leaflets exhibited normal thickness and normal cuspal separation  DOPPLER: Transaortic velocity was within the normal range  There was no evidence  for stenosis  There was no regurgitation  TRICUSPID VALVE: The valve structure was normal  There was normal leaflet separation  DOPPLER: The transtricuspid velocity was within the normal range  There was no evidence for stenosis  There was trace regurgitation  Pulmonary artery  systolic pressure was within the normal range  PULMONIC VALVE: Leaflets exhibited normal thickness, no calcification, and normal cuspal separation  DOPPLER: The transpulmonic velocity was within the normal range  There was trace regurgitation  PERICARDIUM: There was no pericardial effusion  AORTA: The root exhibited normal size for BSA      SYSTEMIC VEINS: IVC: The inferior vena cava was normal in size and course  Respirophasic changes were normal     SYSTEM MEASUREMENT TABLES    2D mode  AV Diam: 3 8 cm  AoR Diam; Mean (2D): 3 8 cm  LA Diam (2D): 5 cm  LA Dimension; Mean (2D): 5 cm  LA/Ao (2D): 1 32  EDV (2D-Cubed): 268 cm3  EF (2D-Cubed): 26 9 %  ESV (2D-Cubed): 196 cm3  FS (2D-Cubed): 9 92 %  FS (2D-Teich): 9 92 %  IVS/LVPW (2D): 1 03  IVSd (2D): 1 19 cm  IVSd; Mean chosen (2D): 1 19 cm  LVEDV MOD BP: 292 cm3  LVESV MOD (BP): 210 cm3  LVIDd (2D): 6 45 cm  LVIDd; Mean (2D): 6 45 cm  LVIDs (2D): 5 81 cm  LVIDs; Mean (2D): 5 81 cm  LVPWd (2D): 1 16 cm  LVPWd; Mean (2D): 1 16 cm  Left Ventricular Ejection Fraction; Method of Disks, Biplane; 2D mode;: 28 1 %  Left Ventricular Ejection Fraction; Teichholz; 2D mode;: 21 2 %  Left Ventricular End Diastolic Volume; Teichholz; 2D mode;: 212 cm3  Left Ventricular End Systolic Volume; Teichholz; 2D mode;: 167 cm3  SV (2D-Cubed): 72 cm3  SV (BP): 82 cm3  Stroke Volume;  Teichholz; 2D mode;: 45 cm3  RVIDd (2D): 4 36 cm  RVIDd; Mean (2D): 4 36 cm    Apical four chamber  LV MOD Diam; End Diastole; (A4C): 2 05 cm  LV MOD Diam; End Diastole; (A4C): 6 55 cm  LV MOD Diam; End Diastole; (A4C): 5 55 cm  LV MOD Diam; End Diastole; (A4C): 6 21 cm  LV MOD Diam; End Diastole; (A4C): 6 7 cm  LV MOD Diam; End Diastole; (A4C): 6 79 cm  LV MOD Diam; End Diastole; (A4C): 5 73 cm  LV MOD Diam; End Diastole; (A4C): 5 97 cm  LV MOD Diam; End Diastole; (A4C): 6 15 cm  LV MOD Diam; End Diastole; (A4C): 6 39 cm  LV MOD Diam; End Diastole; (A4C): 6 61 cm  LV MOD Diam; End Diastole; (A4C): 6 79 cm  LV MOD Diam; End Diastole; (A4C): 6 94 cm  LV MOD Diam; End Diastole; (A4C): 7 cm  LV MOD Diam; End Diastole; (A4C): 6 94 cm  LV MOD Diam; End Diastole; (A4C): 2 93 cm  LV MOD Diam; End Diastole; (A4C): 4 1 cm  LV MOD Diam; End Diastole; (A4C): 4 8 cm  LV MOD Diam; End Diastole; (A4C): 5 34 cm  LV MOD Diam; End Diastole; (A4C): 6 73 cm  LV MOD Diam; End Systole; (A4C): 2 5 cm  LV MOD Diam; End Systole; (A4C): 5 79 cm  LV MOD Diam; End Systole; (A4C): 6 52 cm  LV MOD Diam; End Systole; (A4C): 1 99 cm  LV MOD Diam; End Systole; (A4C): 2 81 cm  LV MOD Diam; End Systole; (A4C): 3 26 cm  LV MOD Diam; End Systole; (A4C): 3 89 cm  LV MOD Diam; End Systole; (A4C): 4 55 cm  LV MOD Diam; End Systole; (A4C): 5 1 cm  LV MOD Diam; End Systole; (A4C): 5 55 cm  LV MOD Diam; End Systole; (A4C): 5 94 cm  LV MOD Diam; End Systole; (A4C): 6 18 cm  LV MOD Diam; End Systole; (A4C): 6 27 cm  LV MOD Diam; End Systole; (A4C): 6 27 cm  LV MOD Diam; End Systole; (A4C): 6 18 cm  LV MOD Diam; End Systole; (A4C): 6 09 cm  LV MOD Diam; End Systole; (A4C): 6 cm  LV MOD Diam; End Systole; (A4C): 6 15 cm  LV MOD Diam; End Systole; (A4C): 6 46 cm  LV MOD Diam; End Systole; (A4C): 6 3 cm  LV MOD Diam; Recent value; End Diastole (A4C): 3 51 cm  LV MOD Diam; Recent value; End Diastole (A4C): 7 02 cm  LV MOD Diam; Recent value; End Diastole (A4C): 7 22 cm  LV MOD Diam; Recent value; End Diastole (A4C): 6 65 cm  LV MOD Diam; Recent value; End Diastole (A4C): 6 17 cm  LV MOD Diam; Recent value; End Diastole (A4C): 7 02 cm  LV MOD Diam; Recent value; End Diastole (A4C): 6 93 cm  LV MOD Diam; Recent value; End Diastole (A4C): 4 4 cm  LV MOD Diam; Recent value; End Diastole (A4C): 5 09 cm  LV MOD Diam; Recent value; End Diastole (A4C): 5 51 cm  LV MOD Diam; Recent value; End Diastole (A4C): 5 79 cm  LV MOD Diam; Recent value; End Diastole (A4C): 5 98 cm  LV MOD Diam; Recent value; End Diastole (A4C): 6 23 cm  LV MOD Diam; Recent value; End Diastole (A4C): 6 39 cm  LV MOD Diam; Recent value; End Diastole (A4C): 6 68 cm  LV MOD Diam; Recent value; End Diastole (A4C): 6 96 cm  LV MOD Diam; Recent value; End Diastole (A4C): 7 12 cm  LV MOD Diam; Recent value; End Diastole (A4C): 7 12 cm  LV MOD Diam; Recent value; End Diastole (A4C): 7 06 cm  LV MOD Diam; Recent value; End Diastole (A4C): 6 9 cm  LV MOD Diam; Recent value;  End Systole (A4C): 2 75 cm  LV MOD Diam; Recent value; End Systole (A4C): 5 95 cm  LV MOD Diam; Recent value; End Systole (A4C): 6 01 cm  LV MOD Diam; Recent value; End Systole (A4C): 5 82 cm  LV MOD Diam; Recent value; End Systole (A4C): 5 7 cm  LV MOD Diam; Recent value; End Systole (A4C): 5 95 cm  LV MOD Diam; Recent value; End Systole (A4C): 6 11 cm  LV MOD Diam; Recent value; End Systole (A4C): 5 35 cm  LV MOD Diam; Recent value; End Systole (A4C): 5 89 cm  LV MOD Diam; Recent value; End Systole (A4C): 6 11 cm  LV MOD Diam; Recent value; End Systole (A4C): 6 07 cm  LV MOD Diam; Recent value; End Systole (A4C): 6 04 cm  LV MOD Diam; Recent value; End Systole (A4C): 6 14 cm  LV MOD Diam; Recent value; End Systole (A4C): 6 39 cm  LV MOD Diam; Recent value; End Systole (A4C): 6 61 cm  LV MOD Diam; Recent value; End Systole (A4C): 6 49 cm  LV MOD Diam; Recent value; End Systole (A4C): 2 69 cm  LV MOD Diam; Recent value; End Systole (A4C): 3 89 cm  LV MOD Diam; Recent value; End Systole (A4C): 4 65 cm  LV MOD Diam; Recent value; End Systole (A4C): 5 85 cm  LVAd (A4C): 6050 mm2  LVAs (A4C): 5150 mm2  LVED MOD A4C: 288 cm3  LVEF MOD A4C: 21 7 %  LVESV MOD A4C: 225 cm3  LVLD A4C: 10 3 cm  LVLS A4C: 9 86 cm  Left Ventricle diastolic major axis; Most recent value chosen; Method of Disks, Single Plane; 2D mode; Apical four chamber;: 9 43 cm  Left Ventricle systolic major axis; Most recent value chosen; Method of Disks, Single Plane; 2D mode; Apical four chamber;: 9 43 cm  Left Ventricular Diastolic Area; Most recent value chosen; Method of Disks, Single Plane; 2D mode; Apical four chamber;: 5960 mm2  Left Ventricular End Diastolic Volume; Most recent value chosen; Method of Disks, Single Plane; 2D mode; Apical four chamber;: 300 cm3  Left Ventricular End Systolic Volume; Most recent value chosen; Method of Disks, Single Plane; 2D mode; Apical four chamber;: 235 cm3  Left Ventricular Systolic Area;  Most recent value chosen; Method of Disks, Single Plane; 2D mode; Apical four chamber;: 5230 mm2  SV MOD A4C: 65 cm3    Apical two chamber  LV MOD Diam; Recent value; End Diastole (A2C): 2 9 cm  LV MOD Diam; Recent value; End Diastole (A2C): 6 86 cm  LV MOD Diam; Recent value; End Diastole (A2C): 6 9 cm  LV MOD Diam; Recent value; End Diastole (A2C): 6 39 cm  LV MOD Diam; Recent value; End Diastole (A2C): 2 33 cm  LV MOD Diam; Recent value; End Diastole (A2C): 3 07 cm  LV MOD Diam; Recent value; End Diastole (A2C): 3 73 cm  LV MOD Diam; Recent value; End Diastole (A2C): 4 34 cm  LV MOD Diam; Recent value; End Diastole (A2C): 4 69 cm  LV MOD Diam; Recent value; End Diastole (A2C): 5 11 cm  LV MOD Diam; Recent value; End Diastole (A2C): 5 4 cm  LV MOD Diam; Recent value; End Diastole (A2C): 5 56 cm  LV MOD Diam; Recent value; End Diastole (A2C): 5 62 cm  LV MOD Diam; Recent value; End Diastole (A2C): 5 71 cm  LV MOD Diam; Recent value; End Diastole (A2C): 5 84 cm  LV MOD Diam; Recent value; End Diastole (A2C): 5 94 cm  LV MOD Diam; Recent value; End Diastole (A2C): 6 13 cm  LV MOD Diam; Recent value; End Diastole (A2C): 6 64 cm  LV MOD Diam; Recent value; End Diastole (A2C): 7 02 cm  LV MOD Diam; Recent value; End Diastole (A2C): 6 42 cm  LV MOD Diam; Recent value; End Systole (A2C): 3 74 cm  LV MOD Diam; Recent value; End Systole (A2C): 6 08 cm  LV MOD Diam; Recent value; End Systole (A2C): 6 33 cm  LV MOD Diam; Recent value; End Systole (A2C): 5 95 cm  LV MOD Diam; Recent value; End Systole (A2C): 5 48 cm  LV MOD Diam; Recent value; End Systole (A2C): 4 18 cm  LV MOD Diam; Recent value; End Systole (A2C): 4 66 cm  LV MOD Diam; Recent value; End Systole (A2C): 4 97 cm  LV MOD Diam; Recent value; End Systole (A2C): 5 1 cm  LV MOD Diam; Recent value; End Systole (A2C): 4 97 cm  LV MOD Diam; Recent value; End Systole (A2C): 4 91 cm  LV MOD Diam; Recent value; End Systole (A2C): 5 cm  LV MOD Diam; Recent value;  End Systole (A2C): 5 16 cm  LV MOD Diam; Recent value; End Systole (A2C): 5 29 cm  LV MOD Diam; Recent value; End Systole (A2C): 2 47 cm  LV MOD Diam; Recent value; End Systole (A2C): 3 17 cm  LV MOD Diam; Recent value; End Systole (A2C): 3 71 cm  LV MOD Diam; Recent value; End Systole (A2C): 5 73 cm  LV MOD Diam; Recent value; End Systole (A2C): 6 24 cm  LV MOD Diam; Recent value; End Systole (A2C): 6 3 cm  LVEF MOD A2C: 26 4 %  Left Ventricle diastolic major axis; Most recent value chosen; Method of Disks, Single Plane; 2D mode; Apical two chamber;: 10 7 cm  Left Ventricle systolic major axis; Most recent value chosen; Method of Disks, Single Plane; 2D mode; Apical two chamber;: 9 21 cm  Left Ventricular Diastolic Area; Most recent value chosen; Method of Disks, Single Plane; 2D mode; Apical two chamber;: 5710 mm2  Left Ventricular End Diastolic Volume; Most recent value chosen; Method of Disks, Single Plane; 2D mode; Apical two chamber;: 253 cm3  Left Ventricular End Systolic Volume; Most recent value chosen; Method of Disks, Single Plane; 2D mode; Apical two chamber;: 187 cm3  Left Ventricular Systolic Area; Most recent value chosen; Method of Disks, Single Plane; 2D mode; Apical two chamber;: 4600 mm2  SV MOD A2C: 67 cm3    Tissue Doppler Imaging  LV Peak Early Ramirez Tissue Jairon; Medial MA (TDI): 81 6 mm/s  Left Ventricular Peak Early Diastolic Tissue Velocity; Mean; Mean value chosen; Medial Mitral Annulus; Tissue Doppler Imaging;: 81 6 mm/s    Unspecified Scan Mode  MV Peak Jairon/LV Peak Tissue Jairon E-Wave; Medial MA: 9 2  Dec Rawlins; Antegrade Flow: 5050 mm/s2  Dec Rawlins; Mean; Antegrade Flow: 5050 mm/s2  MV A Jairon: 955 mm/s  MV E Jairon: 747 mm/s  MV E/A Ratio: 0 8  MV Peak A Jairon: 955 mm/s  MV Peak E Jairon; Mean;  Antegrade Flow: 747 mm/s    [address] Accredited Echocardiography Laboratory    Prepared and electronically signed by    Brooklyn Spencer DO  Signed 10-Oct-2017 14:58:43       Results for orders placed during the hospital encounter of 10/09/17   KRISTINA    Narrative [address]  [facility], [address] [facility]  [phone]    Transesophageal Echocardiogram  2D, Doppler, and Color Doppler    Study date:  12-Oct-2017    Patient: Jacque Montero  MR number: [id number]  Account number: [de-identified]  : 1965  Age: 50 years  Gender: Male  Status: Inpatient  Location: Echo lab  Height: 71 in  Weight: 218 lb  BP: 143/ 92 mmHg    Indications: CVA    Diagnoses: 56 - CVA    Sonographer:  Cailin Kent, RN, RCS  Interpreting Physician:  Airam Roman MD  Primary Physician:  Trino Pablo  Referring Physician:  Patria Quincy:  [facility]    SUMMARY    LEFT VENTRICLE:  Systolic function was severely reduced  Ejection fraction was estimated to be 20 - 25 %  There was severe diffuse hypokinesis  Concentric hypertrophy was present  ATRIAL SEPTUM:  No defect or patent foramen ovale was identified by color Doppler or after injection of agitated saline  HISTORY: PRIOR HISTORY: CAD, Dilated LV with Low Ejection Fraction    PROCEDURE: The procedure was performed in the echo lab  This was a routine study  The risks and alternatives of the procedure were explained to the patient and informed consent was obtained  The transesophageal approach was used  The study  included complete 2D imaging, complete spectral Doppler, and color Doppler  The heart rate was 83 bpm, at the start of the study  Intubated with ease  Two intubation attempt(s)  There was no blood detected on the probe  Image quality was  good  MEDICATIONS: Benzocaine spray, topical to oropharynx, prior to procedure  Sedation administered by anesthesia team     LEFT VENTRICLE: Size was normal  Systolic function was severely reduced  Ejection fraction was estimated to be 20 - 25 %  There was severe diffuse hypokinesis  Concentric hypertrophy was present      RIGHT VENTRICLE: The size was normal  Systolic function was normal     LEFT ATRIUM: Size was normal  No thrombus was identified  APPENDAGE: The size was normal  No thrombus was identified  DOPPLER: The function was normal (normal emptying velocity)  ATRIAL SEPTUM: No defect or patent foramen ovale was identified by color Doppler or after injection of agitated saline  RIGHT ATRIUM: Size was normal  No thrombus was identified  MITRAL VALVE: Valve structure was normal  There was normal leaflet separation  There was no echocardiographic evidence of vegetation  DOPPLER: There was mild regurgitation  AORTIC VALVE: The valve was trileaflet  Leaflets exhibited normal thickness, mild calcification, normal cuspal separation, and sclerosis  There was no echocardiographic evidence of vegetation  DOPPLER: There was no regurgitation  TRICUSPID VALVE: The valve structure was normal  There was normal leaflet separation  There was no echocardiographic evidence of vegetation  DOPPLER: There was no significant regurgitation  PULMONIC VALVE: Leaflets exhibited normal thickness, no calcification, and normal cuspal separation  DOPPLER: The transpulmonic velocity was within the normal range  There was mild regurgitation  PERICARDIUM: There was no pericardial effusion  The pericardium was normal in appearance  AORTA: The root exhibited normal size  [address] Accredited Echocardiography Laboratory    Prepared and electronically signed by    Kirsten Salmon MD  Signed 12-Oct-2017 16:33:18       No procedure found  No results found for this or any previous visit  Orders Placed This Encounter   Procedures    Basic metabolic panel    POCT ECG    Echo complete with contrast if indicated           Arturo Andrea MD, [facility] of the record may have been created with voice recognition software  Occasional wrong word or "sound a like" substitutions may have occurred due to the inherent limitations of voice recognition software    Read the chart carefully and recognize, using context, where substitutions have occurred  If you have any questions or concerns about the context, text or information contained within the body of this dictation, please contact myself, the provider, for further clarification

## 2021-06-02 NOTE — ASSESSMENT & PLAN NOTE
Ischemic cardiomyopathy with chronic compensated systolic heart failure, managed without diuretics  Presently only on a beta-blocker  Previously declined AICD  I have advised the patient to start lisinopril 10 mg at bedtime  Obtain BNP in a week  He requested a repeat echo after 2 months of stable medical therapy to reassess ejection fraction and need for device therapy

## 2021-06-02 NOTE — ASSESSMENT & PLAN NOTE
Severe coronary artery disease status post remote CABG and markedly abnormal nuclear stress test in 2017 suggestive of progression of CAD with moderate ischemic burden for which she declined cardiac catheterization at that point  He has remained relatively stable without interim hospitalizations for unstable angina or heart failure  Currently he is asymptomatic  Does not want further stress testing at this time

## 2021-06-02 NOTE — ASSESSMENT & PLAN NOTE
Current anticoagulation therapy to lower risk of recurrent stroke  No obvious cardiac source of embolism identified on KRISTINA

## 2021-06-02 NOTE — ASSESSMENT & PLAN NOTE
BP Readings from Last 3 Encounters:   06/02/21 152/80   04/27/21 150/90   08/11/20 134/70       BP is  still mildly elevated here  Home blood pressures are better  I elected to add lisinopril at bedtime  Check BMP in 1 week  Continue current medications  Lifestyle modification including increased physical activity, low-salt diet rich in fruits and vegetables, avoidance of alcohol intake and maintaining healthy weight  Close follow up with Dr Daune Kussmaul

## 2021-06-13 LAB
BUN SERPL-MCNC: 17 MG/DL (ref 7–25)
BUN/CREAT SERPL: ABNORMAL (CALC) (ref 6–22)
CALCIUM SERPL-MCNC: 8.8 MG/DL (ref 8.6–10.3)
CHLORIDE SERPL-SCNC: 107 MMOL/L (ref 98–110)
CO2 SERPL-SCNC: 28 MMOL/L (ref 20–32)
CREAT SERPL-MCNC: 0.76 MG/DL (ref 0.7–1.33)
GLUCOSE SERPL-MCNC: 121 MG/DL (ref 65–99)
INR PPP: 2.5
POTASSIUM SERPL-SCNC: 4.7 MMOL/L (ref 3.5–5.3)
PROTHROMBIN TIME: 25.4 SEC (ref 9–11.5)
SL AMB EGFR AFRICAN AMERICAN: 121 ML/MIN/1.73M2
SL AMB EGFR NON AFRICAN AMERICAN: 104 ML/MIN/1.73M2
SODIUM SERPL-SCNC: 139 MMOL/L (ref 135–146)

## 2021-06-14 ENCOUNTER — TELEPHONE (OUTPATIENT)
Dept: FAMILY MEDICINE CLINIC | Facility: MEDICAL CENTER | Age: 56
End: 2021-06-14

## 2021-06-14 ENCOUNTER — ANTICOAG VISIT (OUTPATIENT)
Dept: FAMILY MEDICINE CLINIC | Facility: MEDICAL CENTER | Age: 56
End: 2021-06-14

## 2021-06-14 NOTE — RESULT ENCOUNTER NOTE
Lab work reviewed  Blood glucose is mildly elevated at 121 in the prediabetic range  Kidney function is normal   Potassium is normal     Continue lisinopril at the current dose  These results are reassuring  Please call patient with normal results

## 2021-06-28 ENCOUNTER — TELEPHONE (OUTPATIENT)
Dept: FAMILY MEDICINE CLINIC | Facility: MEDICAL CENTER | Age: 56
End: 2021-06-28

## 2021-06-28 DIAGNOSIS — I63.9 CEREBROVASCULAR ACCIDENT (CVA), UNSPECIFIED MECHANISM (HCC): ICD-10-CM

## 2021-06-28 DIAGNOSIS — Z79.01 LONG TERM CURRENT USE OF ANTICOAGULANT THERAPY: ICD-10-CM

## 2021-06-29 RX ORDER — WARFARIN SODIUM 6 MG/1
6 TABLET ORAL DAILY
Qty: 90 TABLET | Refills: 0 | Status: SHIPPED | OUTPATIENT
Start: 2021-06-29 | End: 2021-09-22

## 2021-07-02 RX ORDER — WARFARIN SODIUM 1 MG/1
TABLET ORAL
Qty: 90 TABLET | Refills: 0 | Status: SHIPPED | OUTPATIENT
Start: 2021-07-02 | End: 2021-09-27

## 2021-07-02 NOTE — TELEPHONE ENCOUNTER
Pt's wife called  Pt also needs the 1 mg Warfarin, in addition to the 6 mg that was sent already  She said he has enough to last into next week    Please send to [facility]

## 2021-07-18 LAB
INR PPP: 2.5
PROTHROMBIN TIME: 25.8 SEC (ref 9–11.5)

## 2021-07-19 ENCOUNTER — TELEPHONE (OUTPATIENT)
Dept: FAMILY MEDICINE CLINIC | Facility: MEDICAL CENTER | Age: 56
End: 2021-07-19

## 2021-07-21 ENCOUNTER — ANTICOAG VISIT (OUTPATIENT)
Dept: FAMILY MEDICINE CLINIC | Facility: MEDICAL CENTER | Age: 56
End: 2021-07-21

## 2021-08-29 LAB
INR PPP: 2.8
PROTHROMBIN TIME: 27.9 SEC (ref 9–11.5)

## 2021-08-30 ENCOUNTER — ANTICOAG VISIT (OUTPATIENT)
Dept: FAMILY MEDICINE CLINIC | Facility: MEDICAL CENTER | Age: 56
End: 2021-08-30

## 2021-09-02 ENCOUNTER — HOSPITAL ENCOUNTER (OUTPATIENT)
Dept: NON INVASIVE DIAGNOSTICS | Facility: CLINIC | Age: 56
Discharge: HOME | End: 2021-09-02
Payer: COMMERCIAL

## 2021-09-02 DIAGNOSIS — I25.10 CORONARY ARTERY DISEASE INVOLVING NATIVE CORONARY ARTERY OF NATIVE HEART WITHOUT ANGINA PECTORIS: ICD-10-CM

## 2021-09-02 DIAGNOSIS — I25.5 ISCHEMIC CARDIOMYOPATHY: ICD-10-CM

## 2021-09-02 PROCEDURE — 93306 TTE W/DOPPLER COMPLETE: CPT | Performed by: INTERNAL MEDICINE

## 2021-09-02 PROCEDURE — 93306 TTE W/DOPPLER COMPLETE: CPT

## 2021-09-02 NOTE — RESULT ENCOUNTER NOTE
ECHO reviewed  Pumping strength (ejection fraction) is  still reduced at about 30%  However it is a little better than the previous echo  This shows extensive area of prior heart attack as well  No significant valve abnormalities noted  As we as discussed before, cardiac catheterization and possible  defibrillator placement is indicated, to lower the risk of sudden cardiac death  Historically patient has preferred not to have either of these performed due to concerns of procedure costs  He is on good medical therapy for his weak heart muscle  He will continue his current medicines  Please ask him if he wants to proceed with cardiac catheterization , leading on to  AICD placement

## 2021-09-14 DIAGNOSIS — Z79.01 LONG TERM CURRENT USE OF ANTICOAGULANT THERAPY: Primary | ICD-10-CM

## 2021-09-14 DIAGNOSIS — I67.9 CEREBROVASCULAR DISEASE: ICD-10-CM

## 2021-09-19 DIAGNOSIS — I10 ESSENTIAL HYPERTENSION: ICD-10-CM

## 2021-09-20 RX ORDER — CARVEDILOL PHOSPHATE 40 MG/1
CAPSULE, EXTENDED RELEASE ORAL
Qty: 90 CAPSULE | Refills: 0 | Status: SHIPPED | OUTPATIENT
Start: 2021-09-20 | End: 2021-12-27 | Stop reason: SDUPTHER

## 2021-09-22 DIAGNOSIS — I63.9 CEREBROVASCULAR ACCIDENT (CVA), UNSPECIFIED MECHANISM (HCC): ICD-10-CM

## 2021-09-23 RX ORDER — WARFARIN SODIUM 6 MG/1
6 TABLET ORAL DAILY
Qty: 90 TABLET | Refills: 1 | Status: SHIPPED | OUTPATIENT
Start: 2021-09-23 | End: 2022-03-08 | Stop reason: SDUPTHER

## 2021-09-27 DIAGNOSIS — Z79.01 LONG TERM CURRENT USE OF ANTICOAGULANT THERAPY: ICD-10-CM

## 2021-09-27 RX ORDER — WARFARIN SODIUM 1 MG/1
TABLET ORAL
Qty: 90 TABLET | Refills: 0 | Status: SHIPPED | OUTPATIENT
Start: 2021-09-27 | End: 2022-03-08 | Stop reason: SDUPTHER

## 2021-10-10 LAB
INR PPP: 2.2
PROTHROMBIN TIME: 22.2 SEC (ref 9–11.5)

## 2021-10-11 ENCOUNTER — ANTICOAG VISIT (OUTPATIENT)
Dept: FAMILY MEDICINE CLINIC | Facility: MEDICAL CENTER | Age: 56
End: 2021-10-11

## 2021-10-22 ENCOUNTER — TELEPHONE (OUTPATIENT)
Dept: CARDIOLOGY CLINIC | Facility: MEDICAL CENTER | Age: 56
End: 2021-10-22

## 2021-10-26 ENCOUNTER — OFFICE VISIT (OUTPATIENT)
Dept: FAMILY MEDICINE CLINIC | Facility: MEDICAL CENTER | Age: 56
End: 2021-10-26
Payer: COMMERCIAL

## 2021-10-26 VITALS
BODY MASS INDEX: 28.04 KG/M2 | HEIGHT: 72 IN | HEART RATE: 68 BPM | RESPIRATION RATE: 16 BRPM | SYSTOLIC BLOOD PRESSURE: 136 MMHG | TEMPERATURE: 97.6 F | WEIGHT: 207 LBS | DIASTOLIC BLOOD PRESSURE: 82 MMHG

## 2021-10-26 DIAGNOSIS — Z23 ENCOUNTER FOR IMMUNIZATION: ICD-10-CM

## 2021-10-26 DIAGNOSIS — I10 ESSENTIAL HYPERTENSION: Primary | ICD-10-CM

## 2021-10-26 DIAGNOSIS — L60.3 THIN NAILS: ICD-10-CM

## 2021-10-26 DIAGNOSIS — M70.61 TROCHANTERIC BURSITIS OF BOTH HIPS: ICD-10-CM

## 2021-10-26 DIAGNOSIS — M53.3 SACROILIAC JOINT PAIN: ICD-10-CM

## 2021-10-26 DIAGNOSIS — Z12.2 ENCOUNTER FOR SCREENING FOR LUNG CANCER: ICD-10-CM

## 2021-10-26 DIAGNOSIS — I67.9 CEREBROVASCULAR DISEASE: ICD-10-CM

## 2021-10-26 DIAGNOSIS — Z79.01 LONG TERM CURRENT USE OF ANTICOAGULANT THERAPY: ICD-10-CM

## 2021-10-26 DIAGNOSIS — R73.9 HYPERGLYCEMIA: ICD-10-CM

## 2021-10-26 DIAGNOSIS — Z12.11 SCREENING FOR COLON CANCER: ICD-10-CM

## 2021-10-26 DIAGNOSIS — Z12.5 SCREENING FOR PROSTATE CANCER: ICD-10-CM

## 2021-10-26 DIAGNOSIS — M70.62 TROCHANTERIC BURSITIS OF BOTH HIPS: ICD-10-CM

## 2021-10-26 DIAGNOSIS — Z00.00 PHYSICAL EXAM, ANNUAL: ICD-10-CM

## 2021-10-26 DIAGNOSIS — E78.5 DYSLIPIDEMIA: ICD-10-CM

## 2021-10-26 PROCEDURE — 3075F SYST BP GE 130 - 139MM HG: CPT | Performed by: FAMILY MEDICINE

## 2021-10-26 PROCEDURE — 3008F BODY MASS INDEX DOCD: CPT | Performed by: FAMILY MEDICINE

## 2021-10-26 PROCEDURE — 99396 PREV VISIT EST AGE 40-64: CPT | Performed by: FAMILY MEDICINE

## 2021-10-26 PROCEDURE — 99214 OFFICE O/P EST MOD 30 MIN: CPT | Performed by: FAMILY MEDICINE

## 2021-10-26 PROCEDURE — 1036F TOBACCO NON-USER: CPT | Performed by: FAMILY MEDICINE

## 2021-10-26 PROCEDURE — 3725F SCREEN DEPRESSION PERFORMED: CPT | Performed by: FAMILY MEDICINE

## 2021-10-26 PROCEDURE — 3079F DIAST BP 80-89 MM HG: CPT | Performed by: FAMILY MEDICINE

## 2021-11-21 LAB
ALBUMIN SERPL-MCNC: 4.1 G/DL (ref 3.6–5.1)
ALBUMIN/GLOB SERPL: 1.5 (CALC) (ref 1–2.5)
ALP SERPL-CCNC: 79 U/L (ref 35–144)
ALT SERPL-CCNC: 18 U/L (ref 9–46)
AST SERPL-CCNC: 16 U/L (ref 10–35)
BASOPHILS # BLD AUTO: 43 CELLS/UL (ref 0–200)
BASOPHILS NFR BLD AUTO: 0.6 %
BILIRUB SERPL-MCNC: 0.5 MG/DL (ref 0.2–1.2)
BUN SERPL-MCNC: 18 MG/DL (ref 7–25)
BUN/CREAT SERPL: ABNORMAL (CALC) (ref 6–22)
CALCIUM SERPL-MCNC: 9.1 MG/DL (ref 8.6–10.3)
CHLORIDE SERPL-SCNC: 104 MMOL/L (ref 98–110)
CHOLEST SERPL-MCNC: 257 MG/DL
CHOLEST/HDLC SERPL: 6.9 (CALC)
CO2 SERPL-SCNC: 26 MMOL/L (ref 20–32)
CREAT SERPL-MCNC: 0.83 MG/DL (ref 0.7–1.33)
CREAT UR-MCNC: 79 MG/DL (ref 20–320)
EOSINOPHIL # BLD AUTO: 78 CELLS/UL (ref 15–500)
EOSINOPHIL NFR BLD AUTO: 1.1 %
ERYTHROCYTE [DISTWIDTH] IN BLOOD BY AUTOMATED COUNT: 13.3 % (ref 11–15)
FERRITIN SERPL-MCNC: 185 NG/ML (ref 38–380)
GLOBULIN SER CALC-MCNC: 2.8 G/DL (CALC) (ref 1.9–3.7)
GLUCOSE SERPL-MCNC: 115 MG/DL (ref 65–99)
HBA1C MFR BLD: 5.6 % OF TOTAL HGB
HCT VFR BLD AUTO: 47.6 % (ref 38.5–50)
HDLC SERPL-MCNC: 37 MG/DL
HGB BLD-MCNC: 15.7 G/DL (ref 13.2–17.1)
INR PPP: 2.7
IRON SATN MFR SERPL: 28 % (CALC) (ref 20–48)
IRON SERPL-MCNC: 94 MCG/DL (ref 50–180)
LDLC SERPL CALC-MCNC: 159 MG/DL (CALC)
LYMPHOCYTES # BLD AUTO: 2485 CELLS/UL (ref 850–3900)
LYMPHOCYTES NFR BLD AUTO: 35 %
MCH RBC QN AUTO: 29.2 PG (ref 27–33)
MCHC RBC AUTO-ENTMCNC: 33 G/DL (ref 32–36)
MCV RBC AUTO: 88.5 FL (ref 80–100)
MONOCYTES # BLD AUTO: 675 CELLS/UL (ref 200–950)
MONOCYTES NFR BLD AUTO: 9.5 %
NEUTROPHILS # BLD AUTO: 3820 CELLS/UL (ref 1500–7800)
NEUTROPHILS NFR BLD AUTO: 53.8 %
NONHDLC SERPL-MCNC: 220 MG/DL (CALC)
PLATELET # BLD AUTO: 182 THOUSAND/UL (ref 140–400)
PMV BLD REES-ECKER: 12.8 FL (ref 7.5–12.5)
POTASSIUM SERPL-SCNC: 4.8 MMOL/L (ref 3.5–5.3)
PROT SERPL-MCNC: 6.9 G/DL (ref 6.1–8.1)
PROT UR-MCNC: 6 MG/DL (ref 5–25)
PROT/CREAT UR: 0.08 MG/MG CREAT (ref 0.02–0.13)
PROT/CREAT UR: 76 MG/G CREAT (ref 22–128)
PROTHROMBIN TIME: 27.2 SEC (ref 9–11.5)
RBC # BLD AUTO: 5.38 MILLION/UL (ref 4.2–5.8)
SL AMB EGFR AFRICAN AMERICAN: 116 ML/MIN/1.73M2
SL AMB EGFR NON AFRICAN AMERICAN: 100 ML/MIN/1.73M2
SODIUM SERPL-SCNC: 138 MMOL/L (ref 135–146)
T4 FREE SERPL-MCNC: 1 NG/DL (ref 0.8–1.8)
TIBC SERPL-MCNC: 338 MCG/DL (CALC) (ref 250–425)
TRIGL SERPL-MCNC: 360 MG/DL
TSH SERPL-ACNC: 3.08 MIU/L (ref 0.4–4.5)
WBC # BLD AUTO: 7.1 THOUSAND/UL (ref 3.8–10.8)

## 2021-11-22 ENCOUNTER — ANTICOAG VISIT (OUTPATIENT)
Dept: FAMILY MEDICINE CLINIC | Facility: MEDICAL CENTER | Age: 56
End: 2021-11-22

## 2021-11-26 DIAGNOSIS — I10 ESSENTIAL HYPERTENSION: ICD-10-CM

## 2021-11-28 RX ORDER — LISINOPRIL 10 MG/1
TABLET ORAL
Qty: 90 TABLET | Refills: 2 | Status: SHIPPED | OUTPATIENT
Start: 2021-11-28

## 2021-11-30 ENCOUNTER — TELEPHONE (OUTPATIENT)
Dept: FAMILY MEDICINE CLINIC | Facility: MEDICAL CENTER | Age: 56
End: 2021-11-30

## 2021-12-27 DIAGNOSIS — I10 ESSENTIAL HYPERTENSION: ICD-10-CM

## 2021-12-27 RX ORDER — CARVEDILOL PHOSPHATE 40 MG/1
40 CAPSULE, EXTENDED RELEASE ORAL DAILY
Qty: 90 CAPSULE | Refills: 0 | Status: SHIPPED | OUTPATIENT
Start: 2021-12-27 | End: 2022-03-08 | Stop reason: SDUPTHER

## 2022-01-09 LAB
INR PPP: 2.2
PROTHROMBIN TIME: 22.4 SEC (ref 9–11.5)

## 2022-01-10 ENCOUNTER — ANTICOAG VISIT (OUTPATIENT)
Dept: FAMILY MEDICINE CLINIC | Facility: MEDICAL CENTER | Age: 57
End: 2022-01-10

## 2022-01-10 ENCOUNTER — TELEPHONE (OUTPATIENT)
Dept: FAMILY MEDICINE CLINIC | Facility: MEDICAL CENTER | Age: 57
End: 2022-01-10

## 2022-02-20 LAB
INR PPP: 2.4
PROTHROMBIN TIME: 22.6 SEC (ref 9–11.5)

## 2022-02-21 ENCOUNTER — ANTICOAG VISIT (OUTPATIENT)
Dept: FAMILY MEDICINE CLINIC | Facility: MEDICAL CENTER | Age: 57
End: 2022-02-21

## 2022-02-21 ENCOUNTER — TELEPHONE (OUTPATIENT)
Dept: FAMILY MEDICINE CLINIC | Facility: MEDICAL CENTER | Age: 57
End: 2022-02-21

## 2022-02-21 NOTE — TELEPHONE ENCOUNTER
----- Message from Liane Sood DO sent at 2/21/2022  8:10 AM EST -----  INR is within range  Continue current Coumadin dosing  Patient will also need to follow-up with new PCP within the next 30 days for continued management of Coumadin  Please have the front staff set this up

## 2022-02-22 NOTE — TELEPHONE ENCOUNTER
Await patients or wife's call to set up appt    Detailed message left with coumadin directions and to call the office to set up an appt

## 2022-02-23 ENCOUNTER — OFFICE VISIT (OUTPATIENT)
Dept: CARDIOLOGY CLINIC | Facility: CLINIC | Age: 57
End: 2022-02-23
Payer: COMMERCIAL

## 2022-02-23 VITALS
HEIGHT: 72 IN | BODY MASS INDEX: 29.32 KG/M2 | HEART RATE: 75 BPM | SYSTOLIC BLOOD PRESSURE: 132 MMHG | WEIGHT: 216.5 LBS | OXYGEN SATURATION: 98 % | DIASTOLIC BLOOD PRESSURE: 82 MMHG

## 2022-02-23 DIAGNOSIS — I63.412 CEREBROVASCULAR ACCIDENT (CVA) DUE TO EMBOLISM OF LEFT MIDDLE CEREBRAL ARTERY (HCC): ICD-10-CM

## 2022-02-23 DIAGNOSIS — E78.5 DYSLIPIDEMIA: ICD-10-CM

## 2022-02-23 DIAGNOSIS — I25.10 CORONARY ARTERY DISEASE INVOLVING NATIVE CORONARY ARTERY OF NATIVE HEART WITHOUT ANGINA PECTORIS: Primary | ICD-10-CM

## 2022-02-23 DIAGNOSIS — I25.5 ISCHEMIC CARDIOMYOPATHY: ICD-10-CM

## 2022-02-23 DIAGNOSIS — I10 ESSENTIAL HYPERTENSION: ICD-10-CM

## 2022-02-23 PROCEDURE — 1036F TOBACCO NON-USER: CPT | Performed by: INTERNAL MEDICINE

## 2022-02-23 PROCEDURE — 3008F BODY MASS INDEX DOCD: CPT | Performed by: INTERNAL MEDICINE

## 2022-02-23 PROCEDURE — 99214 OFFICE O/P EST MOD 30 MIN: CPT | Performed by: INTERNAL MEDICINE

## 2022-02-23 NOTE — ASSESSMENT & PLAN NOTE
Severe coronary artery disease status post remote CABG and markedly abnormal nuclear stress test in 2017 suggestive of progression of CAD with moderate ischemic burden for which he has historically  declined cardiac catheterization  He has remained relatively stable without interim hospitalizations for unstable angina or heart failure  Currently he is asymptomatic  Prefers continued  medical management  Tolerating aspirin, beta-blockers  Declines statins

## 2022-02-23 NOTE — ASSESSMENT & PLAN NOTE
Ischemic cardiomyopathy with chronic compensated systolic heart failure, managed without diuretics  Presently on ACEI and  beta-blocker  Previously declined AICD  Last EF 30%  Not keen on repeat echo due to the cost   Last echo cost him 1200 dollars

## 2022-02-23 NOTE — PATIENT INSTRUCTIONS
Continue current cardiac medications  Heart failure is overall compensated  Continue current cardiac medications  Salt restriction  Regular exercise  Report any worsening symptoms such as chest pain, shortness of breath, weight gain, or swelling  Continue modification of cardiac risk factors including control of blood glucose, cholesterol, blood pressure and body weight  Cardiac risk can be lowered with increase in physical activity, plant-based or Mediterranean style start diet, and avoidance of tobacco products  Report any worsening cardiac symptoms (chest pain,shortness of breath with exertion or fainting)  Keep follow-up as planned with primary care and other specialists

## 2022-02-23 NOTE — ASSESSMENT & PLAN NOTE
BP Readings from Last 3 Encounters:   02/23/22 132/82   10/26/21 136/82   06/02/21 152/80       Home blood pressure monitoring recordings reviewed  Overall good control  Continue current medications  Lifestyle modification including increased physical activity, low-salt diet rich in fruits and vegetables, avoidance of alcohol intake and maintaining healthy weight

## 2022-02-23 NOTE — ASSESSMENT & PLAN NOTE
Thromboembolic CVA status post tPA  No intracardiac source identified on KRISTINA  Lifelong anticoagulation

## 2022-02-23 NOTE — PROGRESS NOTES
[facility] [place]  [address] [facility] [address]  [address]  [place] [address]  Phone#  [phone]  Fax#  [phone]  [facility] Cardiology Office Follow-up Visit             NAME: Cole Gamez  AGE: 49 y o  SEX: male   : 1965   MRN: [id number]    DATE: 2022  TIME: 4:50 PM    Assessment and plan:    1  Coronary artery disease involving native coronary artery of native heart without angina pectoris  Assessment & Plan:  Severe coronary artery disease status post remote CABG and markedly abnormal nuclear stress test in 2017 suggestive of progression of CAD with moderate ischemic burden for which he has historically  declined cardiac catheterization  He has remained relatively stable without interim hospitalizations for unstable angina or heart failure  Currently he is asymptomatic  Prefers continued  medical management  Tolerating aspirin, beta-blockers  Declines statins  2  Cerebrovascular accident (CVA) due to embolism of left middle cerebral artery [facility])  Assessment & Plan: Thromboembolic CVA status post tPA  No intracardiac source identified on KRISTINA  Lifelong anticoagulation  3  Ischemic cardiomyopathy  Assessment & Plan:  Ischemic cardiomyopathy with chronic compensated systolic heart failure, managed without diuretics  Presently on ACEI and  beta-blocker  Previously declined AICD  Last EF 30%  Not keen on repeat echo due to the cost   Last echo cost him 1200 dollars  4  Dyslipidemia  Assessment & Plan:  Lab Results   Component Value Date    LDLCALC 159 (H) 2021     Historically declined statins  Continue efforts with diet and exercise  5  Essential hypertension  Assessment & Plan:  BP Readings from Last 3 Encounters:   22 132/82   10/26/21 136/82   21 152/80       Home blood pressure monitoring recordings reviewed  Overall good control  Continue current medications    Lifestyle modification including increased physical activity, low-salt diet rich in fruits and vegetables, avoidance of alcohol intake and maintaining healthy weight  Chief Complaint   Patient presents with    Follow-up       HPI:    Leatha Young is a 49y o -year-old male who presents to the cardiology clinic for follow up  Patient is doing well from a cardiovascular standpoint  Denies recent hospitalizations  Current medications reviewed  Tolerating Ace inhibitors and beta-blockers  No side effects reported  Denies any cough  Denies chest pain on exertion  Denies shortness of breath  Denies sustained palpitations  No syncope  Home BP: reviewed  Good control  Not keen on ICD  Wants to wait on repeat Echo due to cost     Again we discussed the indication for AICD  He prefers not to have that  Sudden cardiac death risk discussed  Discussed last EF of 30%  Clinically he has no heart failure symptoms  He is not on diuretic  He has excellent functional capacity  He wants to continue medical management and avoid further invasive procedures  Discussed COVID vaccination  He has not had COVID  Compliant to Coumadin  Recent INR therapeutic  He is a former smoker  Has not smoked in a while  Occasional beer drinking  No heavy alcohol use  No recent drug use  Cardiology Problem list:  CAD status post CABG 2002  Ischemic cardiomyopathy:  11/17-severe partially reversible inferior/ inferolateral defect, EF 20%: Catheterization advised  Declined ICD  CVA 10/17- L MCA M3 division thrombus s/p tPA -warfarin started  KRISTINA, no PFO/ASD, atrial thrombus or LV thrombus  EF between 20 and 25%   9/21: Echo EF 85%, grade 2 diastolic dysfunction: Declined ICD  9/21: Again declined cardiac catheterization  Hypertension  Dyslipidemia: Declined statins    Past history, family history, social history, current medications, vital signs, recent lab and imaging studies and  prior cardiology studies reviewed independently on this visit      BP Readings from Last 4 Encounters:   02/23/22 132/82   10/26/21 136/82   06/02/21 152/80   04/27/21 150/90      Wt Readings from Last 3 Encounters:   02/23/22 98 2 kg (216 lb 8 oz)   10/26/21 93 9 kg (207 lb)   06/02/21 98 4 kg (217 lb)       Lab Results   Component Value Date    LDLCALC 159 (H) 11/20/2021     Lab Results   Component Value Date    CREATININE 0 83 11/20/2021      Lab Results   Component Value Date    INR 2 4 (H) 02/19/2022    INR 2 2 (H) 01/08/2022    INR 2 7 (H) 11/20/2021    PROTIME 22 6 (H) 02/19/2022    PROTIME 22 4 (H) 01/08/2022    PROTIME 27 2 (H) 11/20/2021       ALLERGIES:  Allergies   Allergen Reactions    Penicillins          Current Outpatient Medications:     aspirin 81 mg chewable tablet, Chew 1 tablet daily, Disp: 30 tablet, Rfl: 0    carvedilol (COREG CR) 40 MG 24 hr capsule, Take 1 capsule (40 mg total) by mouth daily, Disp: 90 capsule, Rfl: 0    lisinopril (ZESTRIL) 10 mg tablet, TAKE 1 TABLET BY MOUTH DAILY AT BEDTIME, Disp: 90 tablet, Rfl: 2    warfarin (COUMADIN) 1 mg tablet, TAKE ONE TABLET BY MOUTH EVERY DAY OR AS DIRECTED, Disp: 90 tablet, Rfl: 0    warfarin (COUMADIN) 6 mg tablet, TAKE 1 TABLET (6 MG TOTAL) BY MOUTH DAILY, Disp: 90 tablet, Rfl: 1    Review of Systems   Constitutional: Negative  HENT: Negative  Eyes: Negative  Respiratory: Negative for cough and shortness of breath  Cardiovascular: Negative for chest pain and palpitations  Gastrointestinal: Negative  Endocrine: Negative  Genitourinary: Negative  Musculoskeletal: Negative  Skin: Negative  Allergic/Immunologic: Negative  Neurological: Negative  Hematological: Does not bruise/bleed easily  Psychiatric/Behavioral: Negative          Past Medical History:   Diagnosis Date    Cardiac disease     Hx of CABG     Stroke [facility])      Past Surgical History:   Procedure Laterality Date    APPENDECTOMY      CORONARY ARTERY BYPASS GRAFT       Family History   Problem Relation Age of Onset  Hypertension Mother     Colon cancer Mother     Hypertension Father        Social History   reports that he quit smoking about 5 years ago  He has a 30 00 pack-year smoking history  He has quit using smokeless tobacco  He reports current alcohol use  He reports previous drug use  Drug: "Crack" cocaine  Objective:     Vitals:    02/23/22 1632   BP: 132/82   Pulse: 75   SpO2: 98%     Wt Readings from Last 3 Encounters:   02/23/22 98 2 kg (216 lb 8 oz)   10/26/21 93 9 kg (207 lb)   06/02/21 98 4 kg (217 lb)     Pulse Readings from Last 3 Encounters:   02/23/22 75   10/26/21 68   06/02/21 82     BP Readings from Last 3 Encounters:   02/23/22 132/82   10/26/21 136/82   06/02/21 152/80       Physical Exam  Vitals reviewed  Constitutional:       General: He is not in acute distress  HENT:      Head: Normocephalic  Right Ear: External ear normal       Left Ear: External ear normal    Eyes:      General: No scleral icterus  Neck:      Vascular: No carotid bruit  Cardiovascular:      Rate and Rhythm: Normal rate and regular rhythm  Heart sounds: S1 normal and S2 normal  Murmur heard  Systolic murmur is present with a grade of 2/6  Pulmonary:      Breath sounds: Normal breath sounds  No wheezing or rhonchi  Abdominal:      General: There is no distension  Musculoskeletal:      Right lower leg: No edema  Left lower leg: No edema  Skin:     General: Skin is warm  Findings: No lesion  Neurological:      General: No focal deficit present  Mental Status: He is alert     Psychiatric:         Mood and Affect: Mood normal          Pertinent Laboratory/Diagnostic Studies:    Laboratory studies reviewed personally by Angelique Mendiola MD    BMP:   Lab Results   Component Value Date    SODIUM 138 11/20/2021    K 4 8 11/20/2021     11/20/2021    CO2 26 11/20/2021    BUN 18 11/20/2021    CREATININE 0 83 11/20/2021    GLUC 115 (H) 11/20/2021    CALCIUM 9 1 11/20/2021     CBC:  Lab Results   Component Value Date    WBC 7 1 11/20/2021    WBC 9 60 01/29/2018    RBC 5 38 11/20/2021    RBC 5 31 01/29/2018    HGB 15 7 11/20/2021    HGB 15 3 01/29/2018    HCT 47 6 11/20/2021    HCT 45 9 01/29/2018    MCV 88 5 11/20/2021    MCV 86 01/29/2018    MCH 29 2 11/20/2021    MCH 28 8 01/29/2018    RDW 13 3 11/20/2021    RDW 13 9 01/29/2018     11/20/2021     01/29/2018     Coags:  Results from last 7 days   Lab Units 02/19/22  0821   INR  2 4*     Lipid Profile:   No results found for: CHOL  Lab Results   Component Value Date    HDL 37 (L) 11/20/2021     Lab Results   Component Value Date    LDLCALC 159 (H) 11/20/2021     Lab Results   Component Value Date    TRIG 360 (H) 11/20/2021      Lab Results   Component Value Date    TSH 3 08 11/20/2021     Lab Results   Component Value Date    ALT 18 11/20/2021    AST 16 11/20/2021       Imaging Studies:     No results found  Pauline Kan MD, [facility]    Portions of the record may have been created with voice recognition software  Occasional wrong word or "sound a like" substitutions may have occurred due to the inherent limitations of voice recognition software  Read the chart carefully and recognize, using context, where substitutions have occurred

## 2022-02-23 NOTE — ASSESSMENT & PLAN NOTE
Lab Results   Component Value Date    LDLCALC 159 (H) 11/20/2021     Historically declined statins  Continue efforts with diet and exercise

## 2022-02-28 ENCOUNTER — RA CDI HCC (OUTPATIENT)
Dept: OTHER | Facility: HOSPITAL | Age: 57
End: 2022-02-28

## 2022-02-28 NOTE — PROGRESS NOTES
[facility]  coding opportunities       Chart reviewed, no opportunity found: CHART REVIEWED, NO OPPORTUNITY FOUND            Patients insurance company: [employer] (Medicare Advantage and Commercial)

## 2022-03-08 ENCOUNTER — OFFICE VISIT (OUTPATIENT)
Dept: FAMILY MEDICINE CLINIC | Facility: MEDICAL CENTER | Age: 57
End: 2022-03-08
Payer: COMMERCIAL

## 2022-03-08 VITALS
BODY MASS INDEX: 29.53 KG/M2 | HEIGHT: 72 IN | TEMPERATURE: 97.8 F | DIASTOLIC BLOOD PRESSURE: 78 MMHG | SYSTOLIC BLOOD PRESSURE: 138 MMHG | HEART RATE: 68 BPM | WEIGHT: 218 LBS | RESPIRATION RATE: 16 BRPM

## 2022-03-08 DIAGNOSIS — I10 ESSENTIAL HYPERTENSION: Primary | ICD-10-CM

## 2022-03-08 DIAGNOSIS — E78.5 DYSLIPIDEMIA: ICD-10-CM

## 2022-03-08 DIAGNOSIS — I63.9 CEREBROVASCULAR ACCIDENT (CVA), UNSPECIFIED MECHANISM (HCC): ICD-10-CM

## 2022-03-08 DIAGNOSIS — Z79.01 LONG TERM CURRENT USE OF ANTICOAGULANT THERAPY: ICD-10-CM

## 2022-03-08 PROCEDURE — 1036F TOBACCO NON-USER: CPT

## 2022-03-08 PROCEDURE — 3008F BODY MASS INDEX DOCD: CPT

## 2022-03-08 PROCEDURE — 99214 OFFICE O/P EST MOD 30 MIN: CPT

## 2022-03-08 RX ORDER — WARFARIN SODIUM 1 MG/1
TABLET ORAL
Qty: 90 TABLET | Refills: 1 | Status: SHIPPED | OUTPATIENT
Start: 2022-03-08

## 2022-03-08 RX ORDER — CARVEDILOL PHOSPHATE 40 MG/1
40 CAPSULE, EXTENDED RELEASE ORAL DAILY
Qty: 90 CAPSULE | Refills: 1 | Status: SHIPPED | OUTPATIENT
Start: 2022-03-08

## 2022-03-08 RX ORDER — WARFARIN SODIUM 6 MG/1
6 TABLET ORAL DAILY
Qty: 90 TABLET | Refills: 1 | Status: SHIPPED | OUTPATIENT
Start: 2022-03-08

## 2022-03-08 NOTE — PROGRESS NOTES
Assessment/Plan:    No problem-specific Assessment & Plan notes found for this encounter  Patient will call insurance to discuss cost of colonoscopy, and follow up with office once decision has been made  Will wait to place order for colonoscopy in until I hear back from patient  Discussed COVID, influenza, Tdap, pneumonia vaccines with patient however he declined at this time  Discussed lung cancer screening with patient, declined  Will call patient to follow up regarding adding lipid lowering medication  Follow-up in 6 months or sooner if needed  1  Essential hypertension  Blood pressure stable, continue carvedilol and lisinopril  - carvedilol (COREG CR) 40 MG 24 hr capsule; Take 1 capsule (40 mg total) by mouth daily  Dispense: 90 capsule; Refill: 1    2  Dyslipidemia  Elevated lipids per last blood work, discussed with patient  Previously tried statins however discontinued due to severe myalgias  3  Cerebrovascular accident (CVA), unspecified mechanism ([facility] [address] )  Currently stable, continue aspirin and warfarin   - warfarin (COUMADIN) 6 mg tablet; Take 1 tablet (6 mg total) by mouth daily  Dispense: 90 tablet; Refill: 1    4  Long term current use of anticoagulant therapy  Due to use of warfarin, provided patient with a standing order for INR check  - Protime-INR; Standing  - Protime-INR  - warfarin (COUMADIN) 1 mg tablet; Take 1 tablet by mouth every day or as directed  Dispense: 90 tablet; Refill: 1      Subjective:      Patient ID: Gamaliel Ruiz is a 49 y o  male  68-year-old male presents for follow-up, overall appears to be doing well  He has hypertension, currently taking carvedilol and lisinopril, tolerating well  He has dyslipidemia, no current medication regimen  Reports he has tried statin therapy in the past however did not tolerate medication due to severe  Myalgias    He has cerebrovascular disease, currently on aspirin and Coumadin, tolerating treatment well, no adverse effects or bleeding  He reports having INR checked every 6 weeks  He does state that his nails are still thin as previously discussed at last office visit, however blood work ordered was within normal limits  I discussed referring patient to endocrinology however he declined at this time  Discussed colonoscopy with patient, he agrees to have this done however reports last time it was ordered he called his insurance company and states it was going to cost him $5,800, therefore he never had it done  He follows with cardiology for his CAD, cardiomyopathy  The following portions of the patient's history were reviewed and updated as appropriate: allergies, current medications, past medical history, past social history, past surgical history and problem list     Review of Systems   Constitutional: Negative for chills and fever  HENT: Negative for ear pain and sore throat  Eyes: Negative for pain and visual disturbance  Respiratory: Negative for cough, chest tightness and shortness of breath  Cardiovascular: Negative for chest pain and palpitations  Gastrointestinal: Negative for abdominal pain, constipation, diarrhea, nausea and vomiting  Endocrine: Negative  Genitourinary: Negative for dysuria and hematuria  Musculoskeletal: Negative for arthralgias, back pain and myalgias  Skin: Negative for color change and rash  Neurological: Negative for dizziness, seizures, syncope and headaches  Psychiatric/Behavioral: Negative for agitation, behavioral problems and confusion  All other systems reviewed and are negative  Objective:      /78 (BP Location: Left arm, Patient Position: Sitting, Cuff Size: Adult)   Pulse 68   Temp 97 8 °F (36 6 °C)   Resp 16   Ht 5' 11 5" (1 816 m)   Wt 98 9 kg (218 lb)   BMI 29 98 kg/m²          Physical Exam  Vitals reviewed  Constitutional:       General: He is not in acute distress  Appearance: Normal appearance  He is not ill-appearing     HENT: Head: Normocephalic and atraumatic  Eyes:      Extraocular Movements: Extraocular movements intact  Conjunctiva/sclera: Conjunctivae normal       Pupils: Pupils are equal, round, and reactive to light  Cardiovascular:      Rate and Rhythm: Normal rate and regular rhythm  Pulses: Normal pulses  Heart sounds: Murmur heard  Pulmonary:      Effort: Pulmonary effort is normal  No respiratory distress  Breath sounds: Normal breath sounds  No stridor  No wheezing or rhonchi  Abdominal:      General: Abdomen is flat  Bowel sounds are normal       Palpations: Abdomen is soft  Musculoskeletal:         General: Normal range of motion  Cervical back: Normal range of motion and neck supple  Skin:     General: Skin is warm and dry  Capillary Refill: Capillary refill takes less than 2 seconds  Coloration: Skin is not jaundiced or pale  Neurological:      General: No focal deficit present  Mental Status: He is alert and oriented to person, place, and time  Mental status is at baseline  Motor: No weakness  Gait: Gait normal    Psychiatric:         Mood and Affect: Mood normal          Behavior: Behavior normal          Thought Content:  Thought content normal                     Kay Malave

## 2022-03-09 ENCOUNTER — TELEPHONE (OUTPATIENT)
Dept: FAMILY MEDICINE CLINIC | Facility: MEDICAL CENTER | Age: 57
End: 2022-03-09

## 2022-03-09 NOTE — TELEPHONE ENCOUNTER
Called and left patient message to call the office back to discuss adding Zetia 10 mg daily to medication regimen to aid in lowering lipid levels  Previous use of statins caused severe myalgias per patient  Awaiting call back to discuss with patient prior to ordering medication

## 2022-03-28 ENCOUNTER — TELEPHONE (OUTPATIENT)
Dept: FAMILY MEDICINE CLINIC | Facility: MEDICAL CENTER | Age: 57
End: 2022-03-28

## 2022-03-28 NOTE — TELEPHONE ENCOUNTER
Pt's wife LMOM to make office aware pt is scheduled to have his INR drawn on 4/2/22    Routed to Juan M Leon

## 2022-03-29 NOTE — TELEPHONE ENCOUNTER
Ok, noted  Patient will be a little late on getting this drawn as he is usually every 4 weeks however he is maintaining a steady level

## 2022-04-03 LAB
INR PPP: 2.4
PROTHROMBIN TIME: 22.7 SEC (ref 9–11.5)

## 2022-04-04 ENCOUNTER — ANTICOAG VISIT (OUTPATIENT)
Dept: FAMILY MEDICINE CLINIC | Facility: MEDICAL CENTER | Age: 57
End: 2022-04-04

## 2022-04-26 ENCOUNTER — TELEPHONE (OUTPATIENT)
Dept: FAMILY MEDICINE CLINIC | Facility: MEDICAL CENTER | Age: 57
End: 2022-04-26

## 2022-04-26 NOTE — TELEPHONE ENCOUNTER
Eli from [facility] called to ask if pt's new order for protime should be done daily, weekly or monthly  Per Kyara Kline, should be done weekly  Eli will cynthia order to be done weekly    It will be valid for 6 months

## 2022-05-11 ENCOUNTER — TELEPHONE (OUTPATIENT)
Dept: FAMILY MEDICINE CLINIC | Facility: MEDICAL CENTER | Age: 57
End: 2022-05-11

## 2022-05-11 NOTE — TELEPHONE ENCOUNTER
Please call patient and inform him for the back pain he can take Tylenol and Robaxin as needed which is a muscle relaxer that I will be sending into his pharmacy  Decrease aggravating factors, but tried to keep moving so you do not stiffen up  Pulled back muscles may last several weeks  If he develops any numbness or tingling in his legs, incontinence he should proceed to the ER  Avoid use of NSAIDs due to medical history and use of Coumadin and aspirin  For the COVID symptom of slight cough he can take OTC Robitussin DM

## 2022-05-11 NOTE — TELEPHONE ENCOUNTER
Wife called, pt just tested positive for covid  Pt tested due to exposure, wife said his only real sx is a slight cough  Wife wants to know what he can take otc with his other medications

## 2022-05-11 NOTE — TELEPHONE ENCOUNTER
Started with symptoms Monday, no fever  Not vaccinated- cdc guidelines discussed   What OTC medication can he take with his medication , also twisted his back yesterday and has low back pain and neck pain   Pain scale of a 9  Asking what he can take for that as well   Please advise

## 2022-05-22 ENCOUNTER — OFFICE VISIT (OUTPATIENT)
Dept: URGENT CARE | Facility: MEDICAL CENTER | Age: 57
End: 2022-05-22
Payer: COMMERCIAL

## 2022-05-22 VITALS
OXYGEN SATURATION: 98 % | HEIGHT: 71 IN | SYSTOLIC BLOOD PRESSURE: 164 MMHG | BODY MASS INDEX: 28 KG/M2 | TEMPERATURE: 96.8 F | WEIGHT: 200 LBS | RESPIRATION RATE: 18 BRPM | DIASTOLIC BLOOD PRESSURE: 88 MMHG | HEART RATE: 74 BPM

## 2022-05-22 DIAGNOSIS — G47.00 INSOMNIA, UNSPECIFIED TYPE: Primary | ICD-10-CM

## 2022-05-22 PROCEDURE — 99213 OFFICE O/P EST LOW 20 MIN: CPT | Performed by: PHYSICIAN ASSISTANT

## 2022-05-22 RX ORDER — HYDROXYZINE PAMOATE 50 MG/1
50-100 CAPSULE ORAL
Qty: 30 CAPSULE | Refills: 0 | Status: SHIPPED | OUTPATIENT
Start: 2022-05-22

## 2022-05-22 NOTE — PROGRESS NOTES
[facility] [employer] [facility]        NAME: Cayden Mukherjee is a 49 y o  male  : 1965    MRN: [id number]  DATE: May 22, 2022  TIME: 8:46 AM    Assessment and Plan   Insomnia, unspecified type [G47 00]  1  Insomnia, unspecified type  hydrOXYzine pamoate (VISTARIL) 50 mg capsule         Patient Instructions   1  Use the Vistaril as directed for insomnia  2  Call and schedule appointment with your primary care physician for within the next week  Chief Complaint     Chief Complaint   Patient presents with    Sleeping Problem     Approx 2 weeks with trouble sleeping, pt states hes been pacing at night and has intermittent back pain from COVID [+22]         History of Present Illness       72-year-old male patient who was diagnosed with COVID on 2022 all symptoms of which have greatly improved/resolved  He states however since that time he has been having trouble sleeping ranging from not being able to fall sleep or waking up an hour to after falling asleep in the not being able to get back to sleep  He denies any pain associated with this  He does feel a bit anxious since having had COVID  He feels very fatigued due to the lack of sleep  Review of Systems   Review of Systems   Constitutional: Positive for fatigue  Negative for chills and fever  HENT: Negative for ear pain and sore throat  Eyes: Negative for pain and visual disturbance  Respiratory: Negative for cough and shortness of breath  Cardiovascular: Negative for chest pain and palpitations  Gastrointestinal: Negative for abdominal pain and vomiting  Genitourinary: Negative for dysuria and hematuria  Musculoskeletal: Negative for arthralgias and back pain  Skin: Negative for color change and rash  Neurological: Negative for seizures and syncope  All other systems reviewed and are negative          Current Medications       Current Outpatient Medications:     aspirin 81 mg chewable tablet, Chew 1 tablet daily, Disp: 30 tablet, Rfl: 0    carvedilol (COREG CR) 40 MG 24 hr capsule, Take 1 capsule (40 mg total) by mouth daily, Disp: 90 capsule, Rfl: 1    hydrOXYzine pamoate (VISTARIL) 50 mg capsule, Take 1-2 capsules ( mg total) by mouth daily at bedtime, Disp: 30 capsule, Rfl: 0    lisinopril (ZESTRIL) 10 mg tablet, TAKE 1 TABLET BY MOUTH DAILY AT BEDTIME, Disp: 90 tablet, Rfl: 2    warfarin (COUMADIN) 1 mg tablet, Take 1 tablet by mouth every day or as directed, Disp: 90 tablet, Rfl: 1    warfarin (COUMADIN) 6 mg tablet, Take 1 tablet (6 mg total) by mouth daily, Disp: 90 tablet, Rfl: 1    methocarbamol (ROBAXIN) 500 mg tablet, Take 1 tablet (500 mg total) by mouth in the morning and 1 tablet (500 mg total) in the evening and 1 tablet (500 mg total) before bedtime  Do all this for 7 days  , Disp: 21 tablet, Rfl: 0    Current Allergies     Allergies as of 05/22/2022 - Reviewed 05/22/2022   Allergen Reaction Noted    Penicillins  10/09/2017            The following portions of the patient's history were reviewed and updated as appropriate: allergies, current medications, past family history, past medical history, past social history, past surgical history and problem list      Past Medical History:   Diagnosis Date    Cardiac disease     Hx of CABG     Stroke [facility])        Past Surgical History:   Procedure Laterality Date    APPENDECTOMY      CORONARY ARTERY BYPASS GRAFT         Family History   Problem Relation Age of Onset    Hypertension Mother     Colon cancer Mother     Hypertension Father          Medications have been verified  Objective   /88   Pulse 74   Temp (!) 96 8 °F (36 °C) (Temporal)   Resp 18   Ht 5' 11" (1 803 m)   Wt 90 7 kg (200 lb)   SpO2 98%   BMI 27 89 kg/m²        Physical Exam     Physical Exam  Vitals and nursing note reviewed  Constitutional:       General: He is not in acute distress  Appearance: Normal appearance  He is not ill-appearing     HENT: Head: Normocephalic and atraumatic  Right Ear: Tympanic membrane normal       Left Ear: Tympanic membrane normal       Nose: Nose normal       Mouth/Throat:      Mouth: Mucous membranes are moist       Pharynx: Oropharynx is clear  Eyes:      Conjunctiva/sclera: Conjunctivae normal       Pupils: Pupils are equal, round, and reactive to light  Cardiovascular:      Rate and Rhythm: Normal rate and regular rhythm  Pulses: Normal pulses  Heart sounds: Normal heart sounds  Pulmonary:      Effort: Pulmonary effort is normal       Breath sounds: Normal breath sounds  Abdominal:      Tenderness: There is no abdominal tenderness  Musculoskeletal:         General: Normal range of motion  Cervical back: Normal range of motion  Skin:     General: Skin is warm and dry  Neurological:      General: No focal deficit present  Mental Status: He is alert and oriented to person, place, and time     Psychiatric:         Mood and Affect: Mood normal          Behavior: Behavior normal

## 2022-05-22 NOTE — PATIENT INSTRUCTIONS
1  Use the Vistaril as directed for insomnia  2  Call and schedule appointment with your primary care physician for within the next week

## 2022-05-23 ENCOUNTER — TELEPHONE (OUTPATIENT)
Dept: FAMILY MEDICINE CLINIC | Facility: MEDICAL CENTER | Age: 57
End: 2022-05-23

## 2022-05-24 ENCOUNTER — OFFICE VISIT (OUTPATIENT)
Dept: FAMILY MEDICINE CLINIC | Facility: MEDICAL CENTER | Age: 57
End: 2022-05-24
Payer: COMMERCIAL

## 2022-05-24 VITALS
WEIGHT: 208 LBS | SYSTOLIC BLOOD PRESSURE: 138 MMHG | HEIGHT: 71 IN | BODY MASS INDEX: 29.12 KG/M2 | DIASTOLIC BLOOD PRESSURE: 84 MMHG | HEART RATE: 84 BPM | TEMPERATURE: 98.6 F | RESPIRATION RATE: 16 BRPM

## 2022-05-24 DIAGNOSIS — G47.00 INSOMNIA, UNSPECIFIED TYPE: Primary | ICD-10-CM

## 2022-05-24 DIAGNOSIS — H61.21 IMPACTED CERUMEN OF RIGHT EAR: ICD-10-CM

## 2022-05-24 DIAGNOSIS — R42 INTERMITTENT LIGHTHEADEDNESS: ICD-10-CM

## 2022-05-24 PROCEDURE — 69210 REMOVE IMPACTED EAR WAX UNI: CPT

## 2022-05-24 PROCEDURE — 3008F BODY MASS INDEX DOCD: CPT

## 2022-05-24 PROCEDURE — 1036F TOBACCO NON-USER: CPT

## 2022-05-24 PROCEDURE — 99214 OFFICE O/P EST MOD 30 MIN: CPT

## 2022-05-24 PROCEDURE — 69209 REMOVE IMPACTED EAR WAX UNI: CPT

## 2022-05-24 NOTE — PROGRESS NOTES
Assessment/Plan:    Patient will try Vistaril for insomnia  Advised to call the office if no improvement  May consider trazodone  1  Insomnia, unspecified type  Vistaril prescribed by urgent care on 5/22  Patient will like to try taking this medication at bedtime to see if this helps with his insomnia  Advised patient to call the office if no improvement in symptoms  May consider trazodone  Declined sleep study at this time  2  Intermittent lightheadedness  Positional lightheadedness, intermittent for approximately 1 month  Cerumen impaction noted to right ear which may be contributing to his symptoms  Declined blood work at this time due to cost, will check with his wife  Patient buys stay well hydrated and call office with worsening or persistent symptoms  3  Impacted cerumen of right ear  Tolerated well  - Ear cerumen removal      Subjective:      Patient ID: Mina Herring is a 49 y o  male  49year old male presents with complaint of insomnia x 2 weeks since having COVID  Was seen at Urgent Care 2 days ago, prescribed Vistaril, only took 1 dose so far  He is not sure if this worked as he slept well that night but he was also very tired due to not sleeping the night before  Denies feeling depressed, has felt anxious for the past 2 weeks which is affecting his sleep  He is able to fall asleep however is unable to stay asleep and is up 3-4 times nightly  He is getting 8 hours of sleep per night but it is broken sleep  Limited caffeine intake  He also reports feeling lightheaded associated with position change intermittently for approx  1 month  Denies chest pain, sob, palpitations, headache, visual change, speech change, numbness/tingling, weakness         The following portions of the patient's history were reviewed and updated as appropriate: allergies, current medications, past family history, past medical history, past surgical history and problem list     Review of Systems   HENT: Negative  Eyes: Negative  Respiratory: Negative  Cardiovascular: Negative  Gastrointestinal: Negative  Genitourinary: Negative  Musculoskeletal: Negative  Skin: Negative  Neurological: Positive for light-headedness  Negative for dizziness, facial asymmetry, speech difficulty, weakness, numbness and headaches  Psychiatric/Behavioral: Positive for sleep disturbance  Objective:      /84 (BP Location: Left arm, Patient Position: Sitting, Cuff Size: Adult)   Pulse 84   Temp 98 6 °F (37 °C)   Resp 16   Ht 5' 11" (1 803 m)   Wt 94 3 kg (208 lb)   BMI 29 01 kg/m²          Physical Exam  Constitutional:       General: He is not in acute distress  Appearance: Normal appearance  He is not ill-appearing  HENT:      Head: Normocephalic and atraumatic  Right Ear: There is impacted cerumen  Left Ear: Tympanic membrane, ear canal and external ear normal  There is no impacted cerumen  Nose: Nose normal       Mouth/Throat:      Mouth: Mucous membranes are moist       Pharynx: Oropharynx is clear  Eyes:      Extraocular Movements: Extraocular movements intact  Conjunctiva/sclera: Conjunctivae normal       Pupils: Pupils are equal, round, and reactive to light  Cardiovascular:      Rate and Rhythm: Normal rate and regular rhythm  Pulses: Normal pulses  Heart sounds: Normal heart sounds  Pulmonary:      Effort: Pulmonary effort is normal  No respiratory distress  Breath sounds: Normal breath sounds  Abdominal:      General: Abdomen is flat  Bowel sounds are normal       Palpations: Abdomen is soft  Tenderness: There is no abdominal tenderness  Musculoskeletal:         General: Normal range of motion  Cervical back: Normal range of motion and neck supple  Skin:     General: Skin is warm and dry  Neurological:      General: No focal deficit present  Mental Status: He is alert and oriented to person, place, and time   Mental status is at baseline  Psychiatric:         Attention and Perception: Attention normal          Mood and Affect: Mood normal          Speech: Speech normal          Behavior: Behavior normal  Behavior is cooperative  Thought Content:  Thought content normal                     Anthony Glass

## 2022-05-27 ENCOUNTER — TELEPHONE (OUTPATIENT)
Dept: FAMILY MEDICINE CLINIC | Facility: MEDICAL CENTER | Age: 57
End: 2022-05-27

## 2022-05-27 DIAGNOSIS — R73.9 HYPERGLYCEMIA: ICD-10-CM

## 2022-05-27 DIAGNOSIS — Z13.0 SCREENING FOR DISORDER OF BLOOD AND BLOOD-FORMING ORGANS: ICD-10-CM

## 2022-05-27 DIAGNOSIS — Z13.29 THYROID DISORDER SCREEN: ICD-10-CM

## 2022-05-27 DIAGNOSIS — E78.5 DYSLIPIDEMIA: ICD-10-CM

## 2022-05-27 DIAGNOSIS — I10 ESSENTIAL HYPERTENSION: Primary | ICD-10-CM

## 2022-05-27 NOTE — TELEPHONE ENCOUNTER
Pt's wife called to request that Sasha Maria put the bloodwork orders in for pt  Pt had told wife that Sasha Maria wanted to order bloodwork, but pt told her not to until he checked with his insurance  Please call wife at [phone] when orders are in  She will come pick them up as pt has to use [facility]      Routed to Sasha Maria

## 2022-05-27 NOTE — TELEPHONE ENCOUNTER
Lab orders placed  This will be fasting blood work  Please call wife and let her know she can come  lab order

## 2022-05-29 LAB
ALBUMIN SERPL-MCNC: 4.1 G/DL (ref 3.6–5.1)
ALBUMIN/GLOB SERPL: 1.5 (CALC) (ref 1–2.5)
ALP SERPL-CCNC: 82 U/L (ref 35–144)
ALT SERPL-CCNC: 22 U/L (ref 9–46)
AST SERPL-CCNC: 16 U/L (ref 10–35)
BASOPHILS # BLD AUTO: 27 CELLS/UL (ref 0–200)
BASOPHILS NFR BLD AUTO: 0.4 %
BILIRUB SERPL-MCNC: 0.5 MG/DL (ref 0.2–1.2)
BUN SERPL-MCNC: 15 MG/DL (ref 7–25)
BUN/CREAT SERPL: ABNORMAL (CALC) (ref 6–22)
CALCIUM SERPL-MCNC: 9.1 MG/DL (ref 8.6–10.3)
CHLORIDE SERPL-SCNC: 107 MMOL/L (ref 98–110)
CHOLEST SERPL-MCNC: 229 MG/DL
CHOLEST/HDLC SERPL: 6.2 (CALC)
CO2 SERPL-SCNC: 25 MMOL/L (ref 20–32)
CREAT SERPL-MCNC: 0.74 MG/DL (ref 0.7–1.33)
EOSINOPHIL # BLD AUTO: 27 CELLS/UL (ref 15–500)
EOSINOPHIL NFR BLD AUTO: 0.4 %
ERYTHROCYTE [DISTWIDTH] IN BLOOD BY AUTOMATED COUNT: 12.9 % (ref 11–15)
GLOBULIN SER CALC-MCNC: 2.7 G/DL (CALC) (ref 1.9–3.7)
GLUCOSE SERPL-MCNC: 110 MG/DL (ref 65–99)
HBA1C MFR BLD: 5.8 % OF TOTAL HGB
HCT VFR BLD AUTO: 44.6 % (ref 38.5–50)
HDLC SERPL-MCNC: 37 MG/DL
HGB BLD-MCNC: 15 G/DL (ref 13.2–17.1)
INR PPP: 2.7
LDLC SERPL CALC-MCNC: 151 MG/DL (CALC)
LYMPHOCYTES # BLD AUTO: 2171 CELLS/UL (ref 850–3900)
LYMPHOCYTES NFR BLD AUTO: 32.4 %
MCH RBC QN AUTO: 29.1 PG (ref 27–33)
MCHC RBC AUTO-ENTMCNC: 33.6 G/DL (ref 32–36)
MCV RBC AUTO: 86.4 FL (ref 80–100)
MONOCYTES # BLD AUTO: 509 CELLS/UL (ref 200–950)
MONOCYTES NFR BLD AUTO: 7.6 %
NEUTROPHILS # BLD AUTO: 3966 CELLS/UL (ref 1500–7800)
NEUTROPHILS NFR BLD AUTO: 59.2 %
NONHDLC SERPL-MCNC: 192 MG/DL (CALC)
PLATELET # BLD AUTO: 268 THOUSAND/UL (ref 140–400)
PMV BLD REES-ECKER: 12.3 FL (ref 7.5–12.5)
POTASSIUM SERPL-SCNC: 4.5 MMOL/L (ref 3.5–5.3)
PROT SERPL-MCNC: 6.8 G/DL (ref 6.1–8.1)
PROTHROMBIN TIME: 25.2 SEC (ref 9–11.5)
RBC # BLD AUTO: 5.16 MILLION/UL (ref 4.2–5.8)
SL AMB EGFR AFRICAN AMERICAN: 121 ML/MIN/1.73M2
SL AMB EGFR NON AFRICAN AMERICAN: 105 ML/MIN/1.73M2
SODIUM SERPL-SCNC: 141 MMOL/L (ref 135–146)
TRIGL SERPL-MCNC: 253 MG/DL
TSH SERPL-ACNC: 2.19 MIU/L (ref 0.4–4.5)
WBC # BLD AUTO: 6.7 THOUSAND/UL (ref 3.8–10.8)

## 2022-05-31 ENCOUNTER — ANTICOAG VISIT (OUTPATIENT)
Dept: FAMILY MEDICINE CLINIC | Facility: MEDICAL CENTER | Age: 57
End: 2022-05-31

## 2022-05-31 DIAGNOSIS — E78.5 DYSLIPIDEMIA: Primary | ICD-10-CM

## 2022-05-31 RX ORDER — FENOFIBRATE 40 MG/1
40 TABLET ORAL DAILY
Qty: 90 TABLET | Refills: 0 | Status: SHIPPED | OUTPATIENT
Start: 2022-05-31 | End: 2022-09-01

## 2022-06-22 NOTE — PROGRESS NOTES
Ear cerumen removal    Date/Time: 5/24/2022 6:00 PM  Performed by: Padma Baptiste  Authorized by: FORREST Vazquez   Universal Protocol:  Consent: Verbal consent obtained  Risks and benefits: risks, benefits and alternatives were discussed  Consent given by: patient  Timeout called at: 5/24/2022 6:00 PM   Patient understanding: patient states understanding of the procedure being performed  Patient consent: the patient's understanding of the procedure matches consent given  Procedure consent: procedure consent matches procedure scheduled      Patient location:  Clinic  Procedure details:     Location:  R ear    Procedure type: irrigation only    Post-procedure details:     Complication:  None    Hearing quality:  Normal    Patient tolerance of procedure:   Tolerated well, no immediate complications

## 2022-07-03 LAB
INR PPP: 2.6
PROTHROMBIN TIME: 24.8 SEC (ref 9–11.5)

## 2022-07-06 ENCOUNTER — ANTICOAG VISIT (OUTPATIENT)
Dept: FAMILY MEDICINE CLINIC | Facility: MEDICAL CENTER | Age: 57
End: 2022-07-06

## 2022-08-07 LAB
INR PPP: 2.7
PROTHROMBIN TIME: 25.3 SEC (ref 9–11.5)

## 2022-08-08 ENCOUNTER — TELEPHONE (OUTPATIENT)
Dept: FAMILY MEDICINE CLINIC | Facility: MEDICAL CENTER | Age: 57
End: 2022-08-08

## 2022-08-09 ENCOUNTER — ANTICOAG VISIT (OUTPATIENT)
Dept: FAMILY MEDICINE CLINIC | Facility: MEDICAL CENTER | Age: 57
End: 2022-08-09

## 2022-08-16 DIAGNOSIS — Z79.01 LONG TERM CURRENT USE OF ANTICOAGULANT THERAPY: Primary | ICD-10-CM

## 2022-08-16 DIAGNOSIS — I63.9 CEREBROVASCULAR ACCIDENT (CVA), UNSPECIFIED MECHANISM (HCC): ICD-10-CM

## 2022-08-20 DIAGNOSIS — I10 ESSENTIAL HYPERTENSION: ICD-10-CM

## 2022-08-20 DIAGNOSIS — I63.9 CEREBROVASCULAR ACCIDENT (CVA), UNSPECIFIED MECHANISM (HCC): ICD-10-CM

## 2022-08-20 DIAGNOSIS — Z79.01 LONG TERM CURRENT USE OF ANTICOAGULANT THERAPY: ICD-10-CM

## 2022-08-22 RX ORDER — LISINOPRIL 10 MG/1
TABLET ORAL
Qty: 90 TABLET | Refills: 2 | Status: SHIPPED | OUTPATIENT
Start: 2022-08-22

## 2022-08-23 RX ORDER — WARFARIN SODIUM 1 MG/1
TABLET ORAL
Qty: 90 TABLET | Refills: 1 | Status: SHIPPED | OUTPATIENT
Start: 2022-08-23

## 2022-08-23 RX ORDER — WARFARIN SODIUM 6 MG/1
TABLET ORAL
Qty: 90 TABLET | Refills: 1 | Status: SHIPPED | OUTPATIENT
Start: 2022-08-23

## 2022-08-23 RX ORDER — CARVEDILOL PHOSPHATE 40 MG/1
40 CAPSULE, EXTENDED RELEASE ORAL DAILY
Qty: 90 CAPSULE | Refills: 1 | Status: SHIPPED | OUTPATIENT
Start: 2022-08-23

## 2022-09-07 ENCOUNTER — OFFICE VISIT (OUTPATIENT)
Dept: FAMILY MEDICINE CLINIC | Facility: MEDICAL CENTER | Age: 57
End: 2022-09-07
Payer: COMMERCIAL

## 2022-09-07 VITALS
OXYGEN SATURATION: 97 % | WEIGHT: 214.8 LBS | BODY MASS INDEX: 29.96 KG/M2 | SYSTOLIC BLOOD PRESSURE: 124 MMHG | HEART RATE: 76 BPM | DIASTOLIC BLOOD PRESSURE: 84 MMHG

## 2022-09-07 DIAGNOSIS — I10 ESSENTIAL HYPERTENSION: Primary | ICD-10-CM

## 2022-09-07 DIAGNOSIS — I63.412 CEREBROVASCULAR ACCIDENT (CVA) DUE TO EMBOLISM OF LEFT MIDDLE CEREBRAL ARTERY (HCC): ICD-10-CM

## 2022-09-07 DIAGNOSIS — R73.9 HYPERGLYCEMIA: ICD-10-CM

## 2022-09-07 DIAGNOSIS — Z12.5 PROSTATE CANCER SCREENING: ICD-10-CM

## 2022-09-07 DIAGNOSIS — E78.5 DYSLIPIDEMIA: ICD-10-CM

## 2022-09-07 PROCEDURE — 3725F SCREEN DEPRESSION PERFORMED: CPT

## 2022-09-07 PROCEDURE — 99214 OFFICE O/P EST MOD 30 MIN: CPT

## 2022-09-07 RX ORDER — WARFARIN SODIUM 6 MG/1
6 TABLET ORAL DAILY
Qty: 90 TABLET | Refills: 1 | Status: CANCELLED | OUTPATIENT
Start: 2022-09-07

## 2022-09-07 RX ORDER — CARVEDILOL PHOSPHATE 40 MG/1
40 CAPSULE, EXTENDED RELEASE ORAL DAILY
Qty: 90 CAPSULE | Refills: 1 | Status: CANCELLED | OUTPATIENT
Start: 2022-09-07

## 2022-09-07 NOTE — PROGRESS NOTES
Assessment/Plan:    Discussed COVID vaccine, PCV 20, Tdap with patient, declined  Colonoscopy due, declined  Lung CT screening due, declined  Fasting blood work ordered, to be done in 6 months prior to next office visit  Follow-up in 6 months or sooner if needed  Encouraged to continue healthy diet and exercise  1  Essential hypertension  Blood pressure stable  Continue carvedilol and lisinopril  - Comprehensive metabolic panel; Future  - Protein / creatinine ratio, urine; Future    2  Cerebrovascular accident (CVA) due to embolism of left middle cerebral artery (HCC)  Currently stable  Continue aspirin and warfarin  3  Dyslipidemia  Lipid levels remain elevated  Discussed in detail importance of lipid lowering medication with patient at today's office visit  He reports intolerance to statin due to severe myalgias in the past   Encouraged patient to try fenofibrate and fish oil as previously discussed, he has not yet started these medications and does not plan on doing so at this time  - Lipid Panel with Direct LDL reflex; Future    4  Hyperglycemia  Elevated A1c per last labs  Prediabetic range  No current medication regimen  Controlled with diet and exercise at this time  Encouraged patient to continue to monitor sugar and carbohydrate intake  - Hemoglobin A1C; Future    5  Prostate cancer screening  - PSA, Total Screen; Future      Subjective:      Patient ID: Cyril Herring is a 49 y o  male  44-year-old male presents for 6 month follow-up  He has hypertension  Currently on carvedilol and lisinopril  He has dyslipidemia  He was advised to start fenofibrate and fish oil since last office visit, however he has not done so and does not plan to do so  History of intolerance to statins  He has a history of CVA  Currently on aspirin and warfarin therapy  He has hyperglycemia  No current medication regimen  Currently controlled with diet and exercise    Tolerating all medications well   Denies adverse side effects  He is requesting refills for carvedilol and warfarin today  However, I informed patient refills were sent in on 08/23  The following portions of the patient's history were reviewed and updated as appropriate: allergies, current medications, past family history, past medical history, past surgical history and problem list     Review of Systems   Constitutional: Negative  HENT: Negative  Eyes: Negative  Respiratory: Negative  Cardiovascular: Negative  Gastrointestinal: Negative  Endocrine: Negative  Genitourinary: Negative  Musculoskeletal: Negative  Skin: Negative  Allergic/Immunologic: Negative  Neurological: Negative  Hematological: Negative  Psychiatric/Behavioral: Negative  Objective:      /84 (BP Location: Left arm, Patient Position: Sitting, Cuff Size: Large)   Pulse 76   Wt 97 4 kg (214 lb 12 8 oz)   SpO2 97%   BMI 29 96 kg/m²          Physical Exam  Vitals and nursing note reviewed  Constitutional:       General: He is not in acute distress  Appearance: Normal appearance  He is not ill-appearing  HENT:      Head: Normocephalic and atraumatic  Eyes:      Conjunctiva/sclera: Conjunctivae normal       Pupils: Pupils are equal, round, and reactive to light  Cardiovascular:      Rate and Rhythm: Normal rate and regular rhythm  Pulses: Normal pulses  Heart sounds: Normal heart sounds  Pulmonary:      Effort: Pulmonary effort is normal  No respiratory distress  Breath sounds: Normal breath sounds  Musculoskeletal:         General: Normal range of motion  Cervical back: Normal range of motion and neck supple  Skin:     General: Skin is warm and dry  Neurological:      General: No focal deficit present  Mental Status: He is alert and oriented to person, place, and time  Mental status is at baseline        Gait: Gait normal    Psychiatric:         Mood and Affect: Mood normal          Behavior: Behavior normal          Thought Content: Thought content normal            BMI Counseling: Body mass index is 29 96 kg/m²  The BMI is above normal  Nutrition recommendations include encouraging healthy choices of fruits and vegetables, consuming healthier snacks, limiting drinks that contain sugar, moderation in carbohydrate intake, increasing intake of lean protein, reducing intake of saturated and trans fat and reducing intake of cholesterol  Exercise recommendations include moderate physical activity 150 minutes/week and exercising 3-5 times per week  No pharmacotherapy was ordered  Rationale for BMI follow-up plan is due to patient being overweight or obese  Depression Screening and Follow-up Plan: Patient was screened for depression during today's encounter  They screened negative with a PHQ-2 score of 0              FORREST Palafox

## 2022-09-08 ENCOUNTER — TELEPHONE (OUTPATIENT)
Dept: FAMILY MEDICINE CLINIC | Facility: MEDICAL CENTER | Age: 57
End: 2022-09-08

## 2022-09-08 DIAGNOSIS — I63.9 CEREBROVASCULAR ACCIDENT (CVA), UNSPECIFIED MECHANISM (HCC): Primary | ICD-10-CM

## 2022-09-08 DIAGNOSIS — Z79.01 LONG TERM CURRENT USE OF ANTICOAGULANT THERAPY: ICD-10-CM

## 2022-09-08 NOTE — TELEPHONE ENCOUNTER
Pt's wife called to request a new order for protime INR testing  He uses [facility], so please mail the order to him  He will be going next Saturday      Routed to [employer]

## 2022-09-18 LAB
INR PPP: 2.4
PROTHROMBIN TIME: 22.6 SEC (ref 9–11.5)

## 2022-09-20 ENCOUNTER — TELEPHONE (OUTPATIENT)
Dept: FAMILY MEDICINE CLINIC | Facility: MEDICAL CENTER | Age: 57
End: 2022-09-20

## 2022-09-20 ENCOUNTER — ANTICOAG VISIT (OUTPATIENT)
Dept: FAMILY MEDICINE CLINIC | Facility: MEDICAL CENTER | Age: 57
End: 2022-09-20

## 2022-09-20 NOTE — TELEPHONE ENCOUNTER
----- Message from [address] [place] [address] sent at 9/20/2022 12:40 PM EDT -----  INR is within normal range  Continue current dose of Warfarin  Recheck in 1 month

## 2022-10-06 ENCOUNTER — VBI (OUTPATIENT)
Dept: ADMINISTRATIVE | Facility: OTHER | Age: 57
End: 2022-10-06

## 2022-10-15 NOTE — RESULT NOTES
6 Verified Results  (1) PT WITH INR [id number] 08:07AM Eric Wallace Order Number: [id number]     Test Name Result Flag Reference   INR 2 87 H 0 86-1 16   PT 30 5 seconds H 12 1-14 4       Plan  Stroke    · (1) PT WITH INR; Status:Active;  Requested for:20Nov2017;

## 2022-10-30 LAB
INR PPP: 1.6
PROTHROMBIN TIME: 16 SEC (ref 9–11.5)

## 2022-10-31 ENCOUNTER — TELEPHONE (OUTPATIENT)
Dept: FAMILY MEDICINE CLINIC | Facility: MEDICAL CENTER | Age: 57
End: 2022-10-31

## 2022-10-31 NOTE — TELEPHONE ENCOUNTER
----- Message from [address] [place] [address] sent at 10/30/2022  8:26 PM EDT -----  Please call patient and inform him his Coumadin level is not in therapeutic range at 1 6  Has he been taking his warfarin daily? Missed any doses? Any changes to his diet or medications that we are not aware of? His levels over the past year have been therapeutic

## 2022-11-01 ENCOUNTER — ANTICOAG VISIT (OUTPATIENT)
Dept: FAMILY MEDICINE CLINIC | Facility: MEDICAL CENTER | Age: 57
End: 2022-11-01

## 2022-11-01 NOTE — TELEPHONE ENCOUNTER
Please advise patient to continue current dosing of warfarin, recheck labs in 2 weeks  No changes at this time  He should in sure that he is following an adequate diet and not missing any doses of his warfarin

## 2022-11-13 LAB
INR PPP: 1.8
PROTHROMBIN TIME: 17.5 SEC (ref 9–11.5)

## 2022-11-15 ENCOUNTER — TELEPHONE (OUTPATIENT)
Dept: FAMILY MEDICINE CLINIC | Facility: MEDICAL CENTER | Age: 57
End: 2022-11-15

## 2022-11-15 ENCOUNTER — ANTICOAG VISIT (OUTPATIENT)
Dept: FAMILY MEDICINE CLINIC | Facility: MEDICAL CENTER | Age: 57
End: 2022-11-15

## 2022-11-15 NOTE — TELEPHONE ENCOUNTER
Patients wife aware, that is correct so she understands the new instructions  Will repeat in 2 weeks

## 2022-11-15 NOTE — TELEPHONE ENCOUNTER
----- Message from [address] [place] [address] sent at 11/15/2022 12:55 PM EST -----  Patient currently takes 7 mg Tuesdays and Thursdays and 6 mg all other days  If this is correct please have him take 7 mg 3 days per week such as Tuesdays, Thursdays, Saturday  And 6 mg all other days  Recheck INR in 2 weeks

## 2022-11-27 LAB
INR PPP: 2.4
PROTHROMBIN TIME: 22.7 SEC (ref 9–11.5)

## 2022-11-28 ENCOUNTER — ANTICOAG VISIT (OUTPATIENT)
Dept: FAMILY MEDICINE CLINIC | Facility: MEDICAL CENTER | Age: 57
End: 2022-11-28

## 2022-11-28 ENCOUNTER — TELEPHONE (OUTPATIENT)
Dept: FAMILY MEDICINE CLINIC | Facility: MEDICAL CENTER | Age: 57
End: 2022-11-28

## 2022-11-28 NOTE — TELEPHONE ENCOUNTER
----- Message from [address] [place] [address] sent at 11/28/2022 12:27 AM EST -----  Continue current dosing of warfarin  7 mg tuesdays Thursdays and saturdays and 6 mg all other days  Recheck inr again in 2 weeks  I would like to continue checking level every 2 weeks for now until we are sure he remains therapeutic at current dosing

## 2022-12-11 LAB
INR PPP: 2
PROTHROMBIN TIME: 19.5 SEC (ref 9–11.5)

## 2022-12-12 ENCOUNTER — TELEPHONE (OUTPATIENT)
Dept: FAMILY MEDICINE CLINIC | Facility: MEDICAL CENTER | Age: 57
End: 2022-12-12

## 2022-12-12 ENCOUNTER — ANTICOAG VISIT (OUTPATIENT)
Dept: FAMILY MEDICINE CLINIC | Facility: MEDICAL CENTER | Age: 57
End: 2022-12-12

## 2022-12-12 NOTE — TELEPHONE ENCOUNTER
----- Message from [address] [place] [address] sent at 12/11/2022  6:19 PM EST -----  INR within range  Please ensure patient remains on current warfarin dosing as previously advised 7 mg tues, thurs, sat and 6 mg all other days  Recheck again in 2 weeks

## 2022-12-14 ENCOUNTER — TELEPHONE (OUTPATIENT)
Dept: FAMILY MEDICINE CLINIC | Facility: MEDICAL CENTER | Age: 57
End: 2022-12-14

## 2022-12-14 NOTE — TELEPHONE ENCOUNTER
S/w wife- they have new Insurance  She has not given RA the new Rx information  She will tomorrow  She thinks it still will not be covered  Says its because it is the Extended release that is not covered   I told her we will follow up with RA after the Rx is resubmitted with the new plan

## 2022-12-14 NOTE — TELEPHONE ENCOUNTER
Patient has been on this medication for quite some time, why does this require prior auth now? Did he have an insurance change? I last refilled this to [facility], not [facility], therefore I am unsure why we would be receiving a prior Auth from [facility]?

## 2022-12-14 NOTE — TELEPHONE ENCOUNTER
Prior Auth request received via fax from [facility] for pt's carvedilol phosphate ER 40 mg ER capsules  Do you want to proceed or prescribe something different? Paperwork is in [employer]  FYI, form says to contact [facility] if received in error, [facility]      Routed to [facility], [address] [place] [address]

## 2023-01-10 ENCOUNTER — TELEPHONE (OUTPATIENT)
Dept: FAMILY MEDICINE CLINIC | Facility: MEDICAL CENTER | Age: 58
End: 2023-01-10

## 2023-01-15 LAB
INR PPP: 3.1
PROTHROMBIN TIME: 28.8 SEC (ref 9–11.5)

## 2023-01-16 ENCOUNTER — TELEPHONE (OUTPATIENT)
Dept: FAMILY MEDICINE CLINIC | Facility: MEDICAL CENTER | Age: 58
End: 2023-01-16

## 2023-01-17 ENCOUNTER — ANTICOAG VISIT (OUTPATIENT)
Dept: FAMILY MEDICINE CLINIC | Facility: MEDICAL CENTER | Age: 58
End: 2023-01-17

## 2023-01-17 NOTE — TELEPHONE ENCOUNTER
Please have patient go back to previous warfarin dosing which is 7 mg on Tuesdays and Thursdays and 6 mg all other days  Recheck in 2 weeks

## 2023-01-29 LAB
INR PPP: 2.1
PROTHROMBIN TIME: 20.1 SEC (ref 9–11.5)

## 2023-01-31 ENCOUNTER — ANTICOAG VISIT (OUTPATIENT)
Dept: FAMILY MEDICINE CLINIC | Facility: MEDICAL CENTER | Age: 58
End: 2023-01-31

## 2023-01-31 ENCOUNTER — TELEPHONE (OUTPATIENT)
Dept: FAMILY MEDICINE CLINIC | Facility: MEDICAL CENTER | Age: 58
End: 2023-01-31

## 2023-01-31 NOTE — TELEPHONE ENCOUNTER
----- Message from Gloria Garcia DO sent at 1/30/2023  5:42 PM EST -----  PCP on vacation  INR reviewed and at goal   Continue current Coumadin dosing and repeat INR in 4 weeks

## 2023-02-26 LAB
INR PPP: 1.9
PROTHROMBIN TIME: 18 SEC (ref 9–11.5)

## 2023-02-27 ENCOUNTER — ANTICOAG VISIT (OUTPATIENT)
Dept: FAMILY MEDICINE CLINIC | Facility: MEDICAL CENTER | Age: 58
End: 2023-02-27

## 2023-03-01 ENCOUNTER — TELEPHONE (OUTPATIENT)
Dept: FAMILY MEDICINE CLINIC | Facility: MEDICAL CENTER | Age: 58
End: 2023-03-01

## 2023-03-01 DIAGNOSIS — Z79.01 LONG TERM CURRENT USE OF ANTICOAGULANT THERAPY: ICD-10-CM

## 2023-03-01 DIAGNOSIS — I63.412 CEREBROVASCULAR ACCIDENT (CVA) DUE TO EMBOLISM OF LEFT MIDDLE CEREBRAL ARTERY (HCC): Primary | ICD-10-CM

## 2023-03-01 NOTE — TELEPHONE ENCOUNTER
Can you please place new standing order for pt/inr x 6 months for patient and have this mailed with additional labs per brandt's message? Thank you  I spoke to the pt and she has an appointment with [facility] but not with pulmonology yet.  I gave the the number to call and make the appointment with pulmonology

## 2023-03-01 NOTE — TELEPHONE ENCOUNTER
Wife called, Lisa Claros told pt that his standing order for his INR is only good for 6 months  Wife would like emailed to her  Pt also needs his routine bw order that you ordered back in September emailed also    (I have them printed out up front to email all at once )

## 2023-03-02 NOTE — TELEPHONE ENCOUNTER
yessica Nieto [place] is going to the lab on 3/18, 3 weeks from last INR  Wife says if he goes in two weeks he would only have one day of an increased dose and doesn't think it will have an effect on his reading with the new dose regimen

## 2023-03-15 ENCOUNTER — OFFICE VISIT (OUTPATIENT)
Dept: FAMILY MEDICINE CLINIC | Facility: MEDICAL CENTER | Age: 58
End: 2023-03-15

## 2023-03-15 VITALS
BODY MASS INDEX: 31.11 KG/M2 | HEART RATE: 72 BPM | HEIGHT: 71 IN | SYSTOLIC BLOOD PRESSURE: 130 MMHG | DIASTOLIC BLOOD PRESSURE: 86 MMHG | WEIGHT: 222.2 LBS | OXYGEN SATURATION: 99 %

## 2023-03-15 DIAGNOSIS — Z12.11 COLON CANCER SCREENING: ICD-10-CM

## 2023-03-15 DIAGNOSIS — Z00.00 ANNUAL PHYSICAL EXAM: Primary | ICD-10-CM

## 2023-03-15 DIAGNOSIS — Z23 ENCOUNTER FOR IMMUNIZATION: ICD-10-CM

## 2023-03-15 NOTE — PROGRESS NOTES
ADULT ANNUAL [address]  [facility]    NAME: Vicente Marx  AGE: 56 y o  SEX: male  : 1965     DATE: 3/15/2023     Assessment and Plan:   Colonoscopy due, order placed  Patient agreeable  Fasting blood work order re-printed and provided to patient, to be done with pt/inr labs next  Follow up in 6 months, sooner if needed  Advised to schedule follow-up with cardiology  Encouraged to continue healthy diet and exercise  Vaccinations declined  Lung CT screening declined  Problem List Items Addressed This Visit    None  Visit Diagnoses     Annual physical exam    -  Primary    Encounter for immunization        Colon cancer screening        Relevant Orders    Ambulatory referral for colonoscopy          Immunizations and preventive care screenings were discussed with patient today  Appropriate education was printed on patient's after visit summary  Discussed risks and benefits of prostate cancer screening  We discussed the controversial history of PSA screening for prostate cancer in the [place] as well as the risk of over detection and over treatment of prostate cancer by way of PSA screening  The patient understands that PSA blood testing is an imperfect way to screen for prostate cancer and that elevated PSA levels in the blood may also be caused by infection, inflammation, prostatic trauma or manipulation, urological procedures, or by benign prostatic enlargement  The role of the digital rectal examination in prostate cancer screening was also discussed and I discussed with him that there is large interobserver variability in the findings of digital rectal examination  Counseling:  Alcohol/drug use: discussed moderation in alcohol intake, the recommendations for healthy alcohol use, and avoidance of illicit drug use  Dental Health: discussed importance of regular tooth brushing, flossing, and dental visits    Injury prevention: discussed safety/seat belts, safety helmets, smoke detectors, carbon dioxide detectors, and smoking near bedding or upholstery  Sexual health: discussed sexually transmitted diseases, partner selection, use of condoms, avoidance of unintended pregnancy, and contraceptive alternatives  Exercise: the importance of regular exercise/physical activity was discussed  Recommend exercise 3-5 times per week for at least 30 minutes  Denies smoking, former smoker, quit in 2017, denies vaping, etoh use rare, denies drug use  BMI Counseling: Body mass index is 30 99 kg/m²  The BMI is above normal  Nutrition recommendations include encouraging healthy choices of fruits and vegetables and increasing intake of lean protein  Exercise recommendations include moderate physical activity 150 minutes/week and exercising 3-5 times per week  Rationale for BMI follow-up plan is due to patient being overweight or obese  Return in 6 months (on 9/15/2023)  Chief Complaint:     Chief Complaint   Patient presents with   • Annual Exam      History of Present Illness:     Adult Annual Physical   Patient here for a comprehensive physical exam   He appears to be doing well overall  He is overdue for fasting blood work, order reprinted and provided today  Colonoscopy overdue, order placed  Patient agreeable  Patient due for follow-up with cardiology, advised to schedule with their office  The patient reports no problems  Diet and Physical Activity  Diet/Nutrition: well balanced diet  Exercise: Walks on weekends  Depression Screening  PHQ-2/9 Depression Screening         General Health  Sleep: sleeps well  Hearing: normal - bilateral   Vision: no vision problems  Dental: no dental visits for >1 year  Brushes teeth regularly   Health  Symptoms include: none     Review of Systems:     Review of Systems   Constitutional: Negative  HENT: Negative  Eyes: Negative  Respiratory: Negative  Cardiovascular: Negative  Gastrointestinal: Negative  Endocrine: Negative  Genitourinary: Negative  Musculoskeletal: Negative  Skin: Negative  Allergic/Immunologic: Negative  Neurological: Negative  Hematological: Negative  Psychiatric/Behavioral: Negative         Past Medical History:     Past Medical History:   Diagnosis Date   • Cardiac disease    • Hx of CABG    • Stroke [facility])       Past Surgical History:     Past Surgical History:   Procedure Laterality Date   • APPENDECTOMY     • CORONARY ARTERY BYPASS GRAFT        Family History:     Family History   Problem Relation Age of Onset   • Hypertension Mother    • Colon cancer Mother    • Hypertension Father       Social History:     Social History     Socioeconomic History   • Marital status: /Civil Union     Spouse name: None   • Number of children: None   • Years of education: None   • Highest education level: None   Occupational History   • None   Tobacco Use   • Smoking status: Former     Packs/day: 1 00     Years: 30 00     Pack years: 30      Types: Cigarettes     Quit date:      Years since quittin 2   • Smokeless tobacco: Former   Vaping Use   • Vaping Use: Never used   Substance and Sexual Activity   • Alcohol use: Yes     Comment: daily beer w/ dinner   • Drug use: Not Currently     Types: "Crack" cocaine     Comment: Back in the    • Sexual activity: Yes     Partners: Female   Other Topics Concern   • None   Social History Narrative   • None     Social Determinants of Health     Financial Resource Strain: Not on file   Food Insecurity: Not on file   Transportation Needs: Not on file   Physical Activity: Not on file   Stress: Not on file   Social Connections: Not on file   Intimate Partner Violence: Not on file   Housing Stability: Not on file      Current Medications:     Current Outpatient Medications   Medication Sig Dispense Refill   • aspirin 81 mg chewable tablet Chew 1 tablet daily 30 tablet 0   • carvedilol (COREG CR) 40 MG 24 hr capsule TAKE 1 CAPSULE (40 MG TOTAL) BY MOUTH DAILY  90 capsule 1   • fenofibrate (FENOGLIDE) 40 MG TABS TAKE 1 TABLET BY MOUTH EVERY DAY 90 tablet 1   • hydrOXYzine pamoate (VISTARIL) 50 mg capsule Take 1-2 capsules ( mg total) by mouth daily at bedtime 30 capsule 0   • lisinopril (ZESTRIL) 10 mg tablet TAKE 1 TABLET BY MOUTH EVERYDAY AT BEDTIME 90 tablet 2   • warfarin (COUMADIN) 1 mg tablet TAKE 1 TABLET BY MOUTH EVERY DAY AS DIRECTED 90 tablet 1   • warfarin (COUMADIN) 6 mg tablet TAKE 1 TABLET BY MOUTH DAILY  90 tablet 1     No current facility-administered medications for this visit  Allergies: Allergies   Allergen Reactions   • Penicillins       Physical Exam:     /86   Pulse 72   Ht 5' 11" (1 803 m)   Wt 101 kg (222 lb 3 2 oz)   SpO2 99%   BMI 30 99 kg/m²     Physical Exam  Vitals and nursing note reviewed  Constitutional:       General: He is not in acute distress  Appearance: Normal appearance  He is obese  He is not ill-appearing  HENT:      Head: Normocephalic and atraumatic  Right Ear: Tympanic membrane, ear canal and external ear normal       Left Ear: Tympanic membrane, ear canal and external ear normal       Nose: Nose normal       Mouth/Throat:      Mouth: Mucous membranes are moist       Pharynx: Oropharynx is clear  Eyes:      General:         Right eye: No discharge  Left eye: No discharge  Conjunctiva/sclera: Conjunctivae normal       Pupils: Pupils are equal, round, and reactive to light  Neck:      Vascular: No carotid bruit  Cardiovascular:      Rate and Rhythm: Normal rate and regular rhythm  Pulses: Normal pulses  Heart sounds: Normal heart sounds  Pulmonary:      Effort: Pulmonary effort is normal       Breath sounds: Normal breath sounds  Abdominal:      General: Abdomen is flat  Bowel sounds are normal       Palpations: Abdomen is soft  Tenderness:  There is no abdominal tenderness  Musculoskeletal:         General: Normal range of motion  Cervical back: Normal range of motion and neck supple  Right lower leg: No edema  Left lower leg: No edema  Skin:     General: Skin is warm and dry  Neurological:      General: No focal deficit present  Mental Status: He is alert and oriented to person, place, and time  Mental status is at baseline  Psychiatric:         Mood and Affect: Mood normal          Behavior: Behavior normal          Thought Content:  Thought content normal           FORREST Kimbrough  [facility]

## 2023-03-19 LAB
ALBUMIN SERPL-MCNC: 4.3 G/DL (ref 3.6–5.1)
ALBUMIN/GLOB SERPL: 1.5 (CALC) (ref 1–2.5)
ALP SERPL-CCNC: 80 U/L (ref 35–144)
ALT SERPL-CCNC: 21 U/L (ref 9–46)
AST SERPL-CCNC: 16 U/L (ref 10–35)
BILIRUB SERPL-MCNC: 0.5 MG/DL (ref 0.2–1.2)
BUN SERPL-MCNC: 19 MG/DL (ref 7–25)
BUN/CREAT SERPL: ABNORMAL (CALC) (ref 6–22)
CALCIUM SERPL-MCNC: 9.2 MG/DL (ref 8.6–10.3)
CHLORIDE SERPL-SCNC: 103 MMOL/L (ref 98–110)
CHOLEST SERPL-MCNC: 266 MG/DL
CHOLEST/HDLC SERPL: 6.7 (CALC)
CO2 SERPL-SCNC: 27 MMOL/L (ref 20–32)
CREAT SERPL-MCNC: 0.81 MG/DL (ref 0.7–1.3)
CREAT UR-MCNC: 100 MG/DL (ref 20–320)
GFR/BSA.PRED SERPLBLD CYS-BASED-ARV: 104 ML/MIN/1.73M2
GLOBULIN SER CALC-MCNC: 2.9 G/DL (CALC) (ref 1.9–3.7)
GLUCOSE SERPL-MCNC: 112 MG/DL (ref 65–99)
HBA1C MFR BLD: 5.7 % OF TOTAL HGB
HDLC SERPL-MCNC: 40 MG/DL
INR PPP: 2.1
LDLC SERPL CALC-MCNC: 177 MG/DL (CALC)
NONHDLC SERPL-MCNC: 226 MG/DL (CALC)
POTASSIUM SERPL-SCNC: 4.4 MMOL/L (ref 3.5–5.3)
PROT SERPL-MCNC: 7.2 G/DL (ref 6.1–8.1)
PROT UR-MCNC: 5 MG/DL (ref 5–25)
PROT/CREAT UR: 0.05 MG/MG CREAT (ref 0.03–0.15)
PROT/CREAT UR: 50 MG/G CREAT (ref 25–148)
PROTHROMBIN TIME: 20.1 SEC (ref 9–11.5)
PSA SERPL-MCNC: 3.98 NG/ML
SODIUM SERPL-SCNC: 139 MMOL/L (ref 135–146)
TRIGL SERPL-MCNC: 275 MG/DL

## 2023-03-20 ENCOUNTER — ANTICOAG VISIT (OUTPATIENT)
Dept: FAMILY MEDICINE CLINIC | Facility: MEDICAL CENTER | Age: 58
End: 2023-03-20

## 2023-03-20 DIAGNOSIS — I10 ESSENTIAL HYPERTENSION: ICD-10-CM

## 2023-03-20 DIAGNOSIS — I63.9 CEREBROVASCULAR ACCIDENT (CVA), UNSPECIFIED MECHANISM (HCC): ICD-10-CM

## 2023-03-20 RX ORDER — CARVEDILOL PHOSPHATE 40 MG/1
CAPSULE, EXTENDED RELEASE ORAL
Qty: 90 CAPSULE | Refills: 1 | Status: SHIPPED | OUTPATIENT
Start: 2023-03-20

## 2023-03-21 RX ORDER — WARFARIN SODIUM 6 MG/1
TABLET ORAL
Qty: 90 TABLET | Refills: 1 | Status: SHIPPED | OUTPATIENT
Start: 2023-03-21

## 2023-03-22 ENCOUNTER — TELEPHONE (OUTPATIENT)
Dept: FAMILY MEDICINE CLINIC | Facility: MEDICAL CENTER | Age: 58
End: 2023-03-22

## 2023-03-22 DIAGNOSIS — R97.20 ELEVATED PSA: ICD-10-CM

## 2023-03-22 DIAGNOSIS — E78.5 DYSLIPIDEMIA: Primary | ICD-10-CM

## 2023-03-22 NOTE — TELEPHONE ENCOUNTER
Patient's wife called back  Patient is not taking the Fenofibrate has not been "for a while"  She will call back about the PSA and F/U apt after speaking with Jasmine Douglass

## 2023-03-22 NOTE — TELEPHONE ENCOUNTER
----- Message from [address] [place] [address] sent at 3/22/2023  7:50 AM EDT -----  Please inform patient A1c remains elevated at 5 7% which is in prediabetic range  This is consistent with previous A1c  No need to start medication for this, limit carbohydrate and sugar intake  Lipid levels are elevated, higher than prior lipid levels  He is already taking fenofibrate and is intolerant to statins, I suggest adding Zetia to the fenofibrate as well as following a healthy low-fat diet  Let me know if patient in agreement and I will send in prescription for this  PSA is on the high end of normal at 3 98, no prior for comparison  I highly suggest repeat PSA in 3 months as well as coming in for prostate exam in office after the blood work is done  Please let me know if patient in agreement and I will order recheck PSA and lipids for 3 months and he can schedule office visit after that

## 2023-03-22 NOTE — TELEPHONE ENCOUNTER
I highly recommend him taking the fenofibrate and adding zetia to help lower his lipid levels  Will wait for call back regarding psa screening

## 2023-03-23 NOTE — TELEPHONE ENCOUNTER
Hold for return call again  giovani Wilder to order and mail blood work in 3 months  Plans to call back to make appt for follow up and prostate exam,  but she failed to discuss with him  aboout the added medication Zetia and if he was going to restart the Fenofibrate  Encouraged he do that and add the Zetia as well   Diana Maldonado will call back , mailed order

## 2023-03-27 NOTE — TELEPHONE ENCOUNTER
Sharrii-S/w patient in detail  Appt set up for 3 month follow up     Patient declines statin therapy at this time

## 2023-03-27 NOTE — TELEPHONE ENCOUNTER
"Follow up is scheduled for 8/1? I would prefer him be seen in 3 months for follow up and he needs to have the labs rechecked prior to office visit  Also, the recommended lipid lowering medication is not a \"statin\"  Please ensure patient is aware of this and I highly recommend restarting his fenofibrate and adding zetia to help lower lipid levels     "

## 2023-04-27 NOTE — RESULT NOTES
Verified Results  (1) PT WITH INR [id number] 08:05AM Shanda Villavicencio Order Number: [id number]     Test Name Result Flag Reference   INR 2 54 H 0 86-1 16   PT 27 7 seconds H 12 1-14 4 Home

## 2023-04-30 LAB
INR PPP: 2.3
PROTHROMBIN TIME: 22.4 SEC (ref 9–11.5)

## 2023-05-01 ENCOUNTER — ANTICOAG VISIT (OUTPATIENT)
Dept: FAMILY MEDICINE CLINIC | Facility: MEDICAL CENTER | Age: 58
End: 2023-05-01

## 2023-05-01 ENCOUNTER — TELEPHONE (OUTPATIENT)
Dept: FAMILY MEDICINE CLINIC | Facility: MEDICAL CENTER | Age: 58
End: 2023-05-01

## 2023-05-01 NOTE — TELEPHONE ENCOUNTER
----- Message from [address] [place] [address] sent at 4/30/2023 11:57 PM EDT -----  Regarding: FW:  Please inform patient INR at goal, continue current dose of Warfarin   ----- Message -----  From: Gloria Pardo Lab Results In  Sent: 4/30/2023   6:30 AM EDT  To: [address] [place] [address]

## 2023-06-11 DIAGNOSIS — I10 ESSENTIAL HYPERTENSION: ICD-10-CM

## 2023-06-11 LAB
CHOLEST SERPL-MCNC: 259 MG/DL
CHOLEST/HDLC SERPL: 6.5 (CALC)
HDLC SERPL-MCNC: 40 MG/DL
INR PPP: 3
LDLC SERPL CALC-MCNC: 173 MG/DL (CALC)
NONHDLC SERPL-MCNC: 219 MG/DL (CALC)
PROTHROMBIN TIME: 29.3 SEC (ref 9–11.5)
PSA SERPL-MCNC: 4.83 NG/ML
TRIGL SERPL-MCNC: 282 MG/DL

## 2023-06-12 ENCOUNTER — ANTICOAG VISIT (OUTPATIENT)
Dept: FAMILY MEDICINE CLINIC | Facility: MEDICAL CENTER | Age: 58
End: 2023-06-12

## 2023-06-12 RX ORDER — LISINOPRIL 10 MG/1
TABLET ORAL
Qty: 90 TABLET | Refills: 2 | Status: SHIPPED | OUTPATIENT
Start: 2023-06-12

## 2023-06-13 ENCOUNTER — TELEPHONE (OUTPATIENT)
Dept: FAMILY MEDICINE CLINIC | Facility: MEDICAL CENTER | Age: 58
End: 2023-06-13

## 2023-06-13 DIAGNOSIS — R97.20 ELEVATED PSA: Primary | ICD-10-CM

## 2023-06-13 NOTE — TELEPHONE ENCOUNTER
----- Message from [address] [place] [address] sent at 6/13/2023 12:44 PM EDT -----  Please inform patient lipid levels remain elevated  As discussed in the past he reports intolerance to statins due to severe myalgias  We discussed fenofibrate and fish oil in the past   At that point patient informed me he was not taking his medications  I strongly recommend patient start both fish oil and fenofibrate due to his elevated lipids, along with heart healthy diet  Prostate screening previously elevated 2 months ago at 3 98, repeat level is even higher at 4 83  I recommend patient have diagnostic PSA and follow-up with urology  Order for blood work and referral to urology placed in chart

## 2023-06-14 NOTE — TELEPHONE ENCOUNTER
I spoke with Soheila Baker, the patients wife, she said he has been taking the fish oil but she will try to get him to take the fenofibrate as well as focus on a heart healthy diet  Mailed a copy of the diagnostic PSA lab slip as they use an outside lab  She will have him get this done and set up an appt with urology

## 2023-06-21 ENCOUNTER — TELEPHONE (OUTPATIENT)
Dept: FAMILY MEDICINE CLINIC | Facility: MEDICAL CENTER | Age: 58
End: 2023-06-21

## 2023-06-21 NOTE — TELEPHONE ENCOUNTER
Pt's wife called  Pt has an appt with you that needs to be changed on 8/1 to discuss his psa  But since you have referred pt to [address] [place] [address] urology  Pt has a regular appt on 9/26  Does he still need a separate appt for his psa? Also, pt was told to get his psa tested again  But he just had it done  Pt did a PSA, total screen and a PSA, diagnostic is ordred  Wife wants to know what the diference is and does he need to do that  Please, advise  No

## 2023-06-22 NOTE — TELEPHONE ENCOUNTER
Patient does not need a separate appointment to come in with me and discuss PSA results  He just needs a regular follow-up with me as he normally would  He does however need to see urology for elevated PSA and have a diagnostic PSA done  The first PSA he had done was a screening PSA which is routine that should be done once yearly on all males over age 48  The second PSA that he still needs to have done which is the diagnostic PSA which is more accurate especially since I do not have any prior PSAs for comparison

## 2023-07-16 LAB
INR PPP: 3.2
PROTHROMBIN TIME: 31.4 SEC (ref 9–11.5)
PSA SERPL-MCNC: 4.74 NG/ML

## 2023-07-18 ENCOUNTER — TELEPHONE (OUTPATIENT)
Dept: FAMILY MEDICINE CLINIC | Facility: MEDICAL CENTER | Age: 58
End: 2023-07-18

## 2023-07-18 ENCOUNTER — ANTICOAG VISIT (OUTPATIENT)
Dept: FAMILY MEDICINE CLINIC | Facility: MEDICAL CENTER | Age: 58
End: 2023-07-18

## 2023-07-18 NOTE — TELEPHONE ENCOUNTER
----- Message from [address] [place] [address] sent at 7/18/2023  9:14 AM EDT -----  Diagnostic PSA is also elevated >4, I still suggest he follows up with urology as previously discussed.

## 2023-07-18 NOTE — TELEPHONE ENCOUNTER
----- Message from [address] [place] [address] sent at 7/18/2023  8:03 AM EDT -----  INR returned slightly elevated. He can skip today's dose and resume normal dosing tomorrow.

## 2023-08-07 ENCOUNTER — OFFICE VISIT (OUTPATIENT)
Dept: CARDIOLOGY CLINIC | Facility: CLINIC | Age: 58
End: 2023-08-07
Payer: COMMERCIAL

## 2023-08-07 VITALS
HEART RATE: 74 BPM | OXYGEN SATURATION: 99 % | SYSTOLIC BLOOD PRESSURE: 140 MMHG | HEIGHT: 71 IN | BODY MASS INDEX: 29.86 KG/M2 | DIASTOLIC BLOOD PRESSURE: 86 MMHG | WEIGHT: 213.3 LBS

## 2023-08-07 DIAGNOSIS — I25.10 CORONARY ARTERY DISEASE INVOLVING NATIVE CORONARY ARTERY OF NATIVE HEART WITHOUT ANGINA PECTORIS: Primary | ICD-10-CM

## 2023-08-07 DIAGNOSIS — I25.5 ISCHEMIC CARDIOMYOPATHY: ICD-10-CM

## 2023-08-07 DIAGNOSIS — Z12.11 ENCOUNTER FOR SCREENING COLONOSCOPY: ICD-10-CM

## 2023-08-07 DIAGNOSIS — I10 ESSENTIAL HYPERTENSION: ICD-10-CM

## 2023-08-07 DIAGNOSIS — I49.3 PVC (PREMATURE VENTRICULAR CONTRACTION): ICD-10-CM

## 2023-08-07 DIAGNOSIS — Z79.01 LONG TERM CURRENT USE OF ANTICOAGULANT THERAPY: ICD-10-CM

## 2023-08-07 PROCEDURE — 99214 OFFICE O/P EST MOD 30 MIN: CPT | Performed by: INTERNAL MEDICINE

## 2023-08-07 PROCEDURE — 93000 ELECTROCARDIOGRAM COMPLETE: CPT | Performed by: INTERNAL MEDICINE

## 2023-08-07 NOTE — PROGRESS NOTES
[facility] [place]  [address]  [address]  [facility] [address]  Phone#  [phone]  Fax#  [phone]  [facility]'s Cardiology Office Follow-up Visit             NAME: Ladonna Nguyen  AGE: 56 y.o. SEX: male   : 1965   MRN: [id number]    DATE: 2023  TIME: 4:50 PM    Cardiology Problem list:  CAD status post CABG   Ischemic cardiomyopathy:  -severe partially reversible inferior/ inferolateral defect, EF 20%: Catheterization advised. Declined ICD  CVA 10/17- L MCA M3 division thrombus s/p tPA -warfarin started. KRISTINA, no PFO/ASD, atrial thrombus or LV thrombus. EF between 20 and 25%   : Echo EF 99%, grade 2 diastolic dysfunction: Declined ICD  : Again declined cardiac catheterization  Hypertension  Dyslipidemia: Declined statins  Frequent PVCs: Trigeminy on EKG     Assessment and plan:    CAD  Severe coronary artery disease status post remote CABG and markedly abnormal nuclear stress test in 2017 suggestive of progression of CAD with moderate ischemic burden for which he has historically  declined cardiac catheterization. He has remained relatively stable without interim hospitalizations for unstable angina or heart failure. Functional capacity is improved. He denies any chest pain or dyspnea. Prefers medical management. Echo advised for evaluation of LV function. He is hesitant about the cost.  If cost is prohibitive, obtain limited echo. Prefers continued  medical management. Tolerating aspirin, beta-blockers. Dyslipidemia  . Still declines statins. Discussed dietary modification and avoidance of cholesterol rich foods. Benefits of statin therapy reviewed again. Prior CVA  S/P TPA  On warfarin    Ischemic CMP  Echo requested to reevaluate LV function. PVCs  Asymptomatic from the frequent PVCs noted on examination. Holter planned to assess PVC burden.   EKG: Normal sinus rhythm, frequent PVCs, left atrial abnormality, prior inferior infarction and anterior infarction        Chief Complaint   Patient presents with   • Follow-up       HPI:    Daniela Lynn is a 56y.o.-year-old male who presents to the cardiology clinic for follow up for the above-listed problems. Remains compliant to medical therapy. No interim hospitalizations. Last EF 30%. Historically has declined cardiac catheterization and AICD. Understands risk of sudden cardiac death. Still declines statin therapy. Looking for alternative ways to lower cholesterol. Dietary modification discussed. Remains compliant to Coumadin. No smoking or drug use. No heavy alcohol use. No bleeding reported. No interim heart failure hospitalizations. He has frequent PVCs on examination today. He is asymptomatic from these. Functional capacity is markedly improved on current medical therapy. We discussed reevaluation of LV function and PVC burden with an echo and Holter. He is concerned about the cost.    Past history, family history, social history, current medications, vital signs, recent lab and imaging studies and  prior cardiology studies reviewed independently on this visit.     Wt Readings from Last 3 Encounters:   08/07/23 96.8 kg (213 lb 4.8 oz)   03/15/23 101 kg (222 lb 3.2 oz)   09/07/22 97.4 kg (214 lb 12.8 oz)     Pulse Readings from Last 3 Encounters:   08/07/23 74   03/15/23 72   09/07/22 76     BP Readings from Last 3 Encounters:   08/07/23 140/86   03/15/23 130/86   09/07/22 124/84       Allergies   Allergen Reactions   • Penicillins        Current Outpatient Medications:   •  aspirin 81 mg chewable tablet, Chew 1 tablet daily, Disp: 30 tablet, Rfl: 0  •  carvedilol (COREG CR) 40 MG 24 hr capsule, take 1 capsule by mouth once daily, Disp: 90 capsule, Rfl: 1  •  fenofibrate (FENOGLIDE) 40 MG TABS, TAKE 1 TABLET BY MOUTH EVERY DAY, Disp: 90 tablet, Rfl: 1  •  hydrOXYzine pamoate (VISTARIL) 50 mg capsule, Take 1-2 capsules ( mg total) by mouth daily at bedtime, Disp: 30 capsule, Rfl: 0  •  lisinopril (ZESTRIL) 10 mg tablet, take 1 tablet by mouth at bedtime, Disp: 90 tablet, Rfl: 2  •  warfarin (COUMADIN) 1 mg tablet, TAKE 1 TABLET BY MOUTH EVERY DAY AS DIRECTED, Disp: 90 tablet, Rfl: 1  •  warfarin (COUMADIN) 6 mg tablet, take 1 tablet by mouth once daily, Disp: 90 tablet, Rfl: 1    Past Medical History:   Diagnosis Date   • Cardiac disease    • Hx of CABG    • Stroke [facility])      Past Surgical History:   Procedure Laterality Date   • APPENDECTOMY     • CORONARY ARTERY BYPASS GRAFT       Family History   Problem Relation Age of Onset   • Hypertension Mother    • Colon cancer Mother    • Hypertension Father      Social History   reports that he quit smoking about 6 years ago. His smoking use included cigarettes. He has a 30.00 pack-year smoking history. He has quit using smokeless tobacco. He reports current alcohol use. He reports that he does not currently use drugs after having used the following drugs: "Crack" cocaine. Review of Systems   Constitutional: Negative for fever. Respiratory: Negative for chest tightness, shortness of breath and wheezing. Cardiovascular: Negative for chest pain, palpitations and leg swelling. Musculoskeletal: Positive for myalgias. Skin: Negative for rash. Neurological: Negative for syncope. Hematological: Does not bruise/bleed easily. Psychiatric/Behavioral: Positive for sleep disturbance. All other systems reviewed and are negative. Objective:     Vitals:    08/07/23 1606   BP: 140/86   Pulse: 74   SpO2: 99%     Physical Exam  Vitals reviewed. HENT:      Head: Normocephalic. Eyes:      General: No scleral icterus. Neck:      Vascular: No carotid bruit. Cardiovascular:      Rate and Rhythm: Normal rate. Rhythm irregular. Frequent Extrasystoles are present. Heart sounds: S1 normal and S2 normal. Murmur heard. Systolic murmur is present with a grade of 2/6. Pulmonary:      Breath sounds:  No wheezing or rhonchi. Musculoskeletal:      Right lower leg: No edema. Left lower leg: No edema. Skin:     General: Skin is warm. Neurological:      General: No focal deficit present. Mental Status: He is alert. Psychiatric:         Mood and Affect: Mood normal.         Pertinent Laboratory/Diagnostic Studies:    Laboratory studies reviewed personally by Ying Kay MD    BMP:   Lab Results   Component Value Date    SODIUM 139 03/18/2023    K 4.4 03/18/2023     03/18/2023    CO2 27 03/18/2023    BUN 19 03/18/2023    CREATININE 0.81 03/18/2023    GLUC 112 (H) 03/18/2023    CALCIUM 9.2 03/18/2023     CBC:  Lab Results   Component Value Date    WBC 6.7 05/28/2022    WBC 9.60 01/29/2018    RBC 5.16 05/28/2022    RBC 5.31 01/29/2018    HGB 15.0 05/28/2022    HGB 15.3 01/29/2018    HCT 44.6 05/28/2022    HCT 45.9 01/29/2018    MCV 86.4 05/28/2022    MCV 86 01/29/2018    MCH 29.1 05/28/2022    MCH 28.8 01/29/2018    RDW 12.9 05/28/2022    RDW 13.9 01/29/2018     05/28/2022     01/29/2018     Coags:    Lipid Profile:   No results found for: "CHOL"  Lab Results   Component Value Date    HDL 40 06/10/2023     Lab Results   Component Value Date    LDLCALC 173 (H) 06/10/2023     Lab Results   Component Value Date    TRIG 282 (H) 06/10/2023      Other labs:  Lab Results   Component Value Date    TSH 3.08 11/20/2021     Lab Results   Component Value Date    ALT 21 03/18/2023    AST 16 03/18/2023           Imaging Studies:     Pertinent imaging studies and cardiac studies were independently reviewed on this visit and findings summarized. Visit diagnoses  1. Coronary artery disease involving native coronary artery of native heart without angina pectoris  Echo complete w/ contrast if indicated    Holter monitor      2. Essential hypertension  POCT ECG      3. Encounter for screening colonoscopy  Ambulatory referral for colon cancer education      4.  PVC (premature ventricular contraction) Echo complete w/ contrast if indicated    Holter monitor      5. Long term current use of anticoagulant therapy        6. Ischemic cardiomyopathy  Echo complete w/ contrast if indicated    Holter monitor          Ginna Barrett MD, [facility]    Portions of the record may have been created with voice recognition software. Occasional wrong word or "sound alike" substitutions may have occurred due to the inherent limitations of voice recognition software. Read the chart carefully and recognize, using context, where substitutions have occurred. Please reach out to me directly for any clarifications.

## 2023-08-07 NOTE — PATIENT INSTRUCTIONS
Echocardiogram planned to evaluate heart function and rule out significant valvular heart disease. Holter monitor planned to assess PVCs. Continue efforts at weight loss including diet modification, increased physical activity and avoidance of calorie dense foods. Mediterranean style plant based diet and calorie restriction will be beneficial.    Avoid cholesterol rich foods such as egg yolk, red meat, high fat dairy and fried food. Try steel cut oats for breakfast with flax seed supplements for cholesterol.

## 2023-08-08 DIAGNOSIS — I63.412 CEREBROVASCULAR ACCIDENT (CVA) DUE TO EMBOLISM OF LEFT MIDDLE CEREBRAL ARTERY (HCC): Primary | ICD-10-CM

## 2023-08-17 NOTE — SOCIAL WORK
CM met pt at bedside and made awar eof CM role at discharge  Pt reported that he lives with his wife Mian Ortiz, his 2 kids and father in law in a 2 story house with no [place]  Pt denies having a LW or POA  Pt has no DMR  Pt was IPTA with all ADL's , drive and emloyed  Pt denies hx with STR, HHC, alc, drug and psych tx  Preferred pharmacy is [facility] in [place]  Pt's wife Mian Ortiz will transport pt home when medically clear for discharge  OT recommendation is OP OT, rx for OP OT signed by Dr Tierra Mejia and placed in pts chart  CM will follow  CM reviewed d/c planning process including the following: identifying help at home, patient preference for d/c planning needs, Discharge Lounge, [facility] Meds to Bed program, availability of treatment team to discuss questions or concerns patient and/or family may have regarding understanding medications and recognizing signs and symptoms once discharged  CM also encouraged patient to follow up with all recommended appointments after discharge  Patient advised of importance for patient and family to participate in managing patients medical well being 
Yes

## 2023-08-20 LAB
INR PPP: 2.4
PROTHROMBIN TIME: 23.5 SEC (ref 9–11.5)

## 2023-08-22 ENCOUNTER — ANTICOAG VISIT (OUTPATIENT)
Dept: FAMILY MEDICINE CLINIC | Facility: MEDICAL CENTER | Age: 58
End: 2023-08-22

## 2023-09-06 ENCOUNTER — TELEPHONE (OUTPATIENT)
Dept: FAMILY MEDICINE CLINIC | Facility: MEDICAL CENTER | Age: 58
End: 2023-09-06

## 2023-09-06 DIAGNOSIS — Z79.01 LONG TERM CURRENT USE OF ANTICOAGULANT THERAPY: Primary | ICD-10-CM

## 2023-09-06 NOTE — TELEPHONE ENCOUNTER
Wife called, pt gets his pt/inr done at [facility] and they told him he needs a new standing order. Can we mail to wife?

## 2023-09-16 DIAGNOSIS — I63.9 CEREBROVASCULAR ACCIDENT (CVA), UNSPECIFIED MECHANISM (HCC): ICD-10-CM

## 2023-09-16 DIAGNOSIS — I10 ESSENTIAL HYPERTENSION: ICD-10-CM

## 2023-09-18 RX ORDER — CARVEDILOL PHOSPHATE 40 MG/1
CAPSULE, EXTENDED RELEASE ORAL
Qty: 90 CAPSULE | Refills: 0 | Status: SHIPPED | OUTPATIENT
Start: 2023-09-18

## 2023-09-18 RX ORDER — WARFARIN SODIUM 6 MG/1
TABLET ORAL
Qty: 90 TABLET | Refills: 0 | Status: SHIPPED | OUTPATIENT
Start: 2023-09-18

## 2023-09-26 ENCOUNTER — OFFICE VISIT (OUTPATIENT)
Dept: FAMILY MEDICINE CLINIC | Facility: MEDICAL CENTER | Age: 58
End: 2023-09-26
Payer: COMMERCIAL

## 2023-09-26 VITALS
HEART RATE: 68 BPM | OXYGEN SATURATION: 98 % | TEMPERATURE: 98 F | WEIGHT: 216 LBS | HEIGHT: 71 IN | SYSTOLIC BLOOD PRESSURE: 148 MMHG | BODY MASS INDEX: 30.24 KG/M2 | DIASTOLIC BLOOD PRESSURE: 88 MMHG | RESPIRATION RATE: 16 BRPM

## 2023-09-26 DIAGNOSIS — I63.412 CEREBROVASCULAR ACCIDENT (CVA) DUE TO EMBOLISM OF LEFT MIDDLE CEREBRAL ARTERY (HCC): ICD-10-CM

## 2023-09-26 DIAGNOSIS — R73.9 HYPERGLYCEMIA: ICD-10-CM

## 2023-09-26 DIAGNOSIS — I25.5 ISCHEMIC CARDIOMYOPATHY: ICD-10-CM

## 2023-09-26 DIAGNOSIS — Z79.01 LONG TERM CURRENT USE OF ANTICOAGULANT THERAPY: ICD-10-CM

## 2023-09-26 DIAGNOSIS — E78.5 DYSLIPIDEMIA: ICD-10-CM

## 2023-09-26 DIAGNOSIS — I25.10 CORONARY ARTERY DISEASE INVOLVING NATIVE CORONARY ARTERY OF NATIVE HEART WITHOUT ANGINA PECTORIS: ICD-10-CM

## 2023-09-26 DIAGNOSIS — I10 ESSENTIAL HYPERTENSION: Primary | ICD-10-CM

## 2023-09-26 PROCEDURE — 99214 OFFICE O/P EST MOD 30 MIN: CPT

## 2023-09-26 RX ORDER — WARFARIN SODIUM 1 MG/1
TABLET ORAL
Qty: 90 TABLET | Refills: 1 | Status: SHIPPED | OUTPATIENT
Start: 2023-09-26

## 2023-09-26 NOTE — PROGRESS NOTES
Assessment/Plan:    Colonoscopy overdue, patient aware. Order for diagnostic PSA reprinted and provided to patient. Referral to urology provided, advised to call and schedule. Message sent to case management regarding this patient's financial difficulties in affording necessary testing. Return in 6 months, sooner if needed. Continue to follow regularly with Cardiology. 1. Essential hypertension  Continue lisinopril and carvedilol at current dose. Follow up with Cardiology as scheduled. 2. Cerebrovascular accident (CVA) due to embolism of left middle cerebral artery (HCC)  Continue Warfarin and aspirin. 3. Dyslipidemia  Continue to follow healthy diet with modifications as discussed by Cardiology. Declines statin due to intolerance. Non-compliant with fenofibrate, does not wish to take this. 4. Hyperglycemia  No current medication regimen, A1c stable on prior labs. 5. Long term current use of anticoagulant therapy  - warfarin (COUMADIN) 1 mg tablet; TAKE 1 TABLET BY MOUTH EVERY DAY AS DIRECTED  Dispense: 90 tablet; Refill: 1    6. Ischemic cardiomyopathy  Followed by cardiology. 7. Coronary artery disease involving native coronary artery of native heart without angina pectoris  Followed by cardiology. Subjective:      Patient ID: Mehnaz Dobbs is a 66 y.o. male. 53-year-old male presents for 6-month follow-up. He has hypertension. Currently on carvedilol and lisinopril. History of CVA. Currently on aspirin and warfarin. Tolerating medications well, denies adverse side effects. He has hyperglycemia, has been managed with diet. He has hyperlipidemia, intolerant to statin. Not taking fenofibrate as previously advised, he tells me Cardiology advised dietary modifications and avoidance of high cholesterol foods. He has CAD status post CABG 2002 and ischemic cardiomyopathy.   Since his last office visit he has followed up with cardiology and cardiac catheterization was advised due to progression of CAD, patient declined. EKG was performed in cardiology office, echocardiogram and Holter monitor were ordered by cardiology which have not yet been done. He is worried about cost due to his high deductible which he tells me his wife will be reaching out to cardiology about this as she just spoke to [employer]. Colonoscopy overdue, he tells me he plans to schedule his after his wife has hers. He is overdue for diagnostic PSA as his previous PSA was elevated, he is also not yet followed up with urology as previously advised. He offers no concerns or complaints at this time. The following portions of the patient's history were reviewed and updated as appropriate: allergies, current medications, past family history, past medical history, past surgical history and problem list.    Review of Systems   Constitutional: Negative. HENT: Negative. Eyes: Negative. Respiratory: Negative. Cardiovascular: Negative. Gastrointestinal: Negative. Endocrine: Negative. Genitourinary: Negative. Musculoskeletal: Negative. Skin: Negative. Allergic/Immunologic: Negative. Hematological: Negative. Psychiatric/Behavioral: Negative. Objective:      /88   Pulse 68   Temp 98 °F (36.7 °C)   Resp 16   Ht 5' 11" (1.803 m)   Wt 98 kg (216 lb)   SpO2 98%   BMI 30.13 kg/m²          Physical Exam  Vitals and nursing note reviewed. Constitutional:       General: He is not in acute distress. Appearance: Normal appearance. He is not ill-appearing. HENT:      Head: Normocephalic and atraumatic. Eyes:      Conjunctiva/sclera: Conjunctivae normal.      Pupils: Pupils are equal, round, and reactive to light. Cardiovascular:      Rate and Rhythm: Normal rate. Rhythm irregular. Pulses: Normal pulses. Heart sounds: Murmur heard. Pulmonary:      Effort: Pulmonary effort is normal. No respiratory distress.       Breath sounds: Normal breath sounds. No stridor. No wheezing, rhonchi or rales. Musculoskeletal:         General: Normal range of motion. Cervical back: Normal range of motion and neck supple. Skin:     General: Skin is warm and dry. Neurological:      General: No focal deficit present. Mental Status: He is alert and oriented to person, place, and time. Mental status is at baseline. Psychiatric:         Mood and Affect: Mood normal.         Behavior: Behavior normal.         Thought Content:  Thought content normal.                    Adwoa Cardenasires

## 2023-09-27 ENCOUNTER — PATIENT OUTREACH (OUTPATIENT)
Dept: FAMILY MEDICINE CLINIC | Facility: MEDICAL CENTER | Age: 58
End: 2023-09-27

## 2023-09-27 DIAGNOSIS — Z78.9 NEEDS ASSISTANCE WITH COMMUNITY RESOURCES: Primary | ICD-10-CM

## 2023-09-27 NOTE — PROGRESS NOTES
Received referral via in basket message from PCP for complex care management. Chart reviewed. Requested to contact patients wife. Called wife, left message on machine with my contact information. Will call again later this week.

## 2023-10-01 LAB
INR PPP: 2.1
PROTHROMBIN TIME: 20.8 SEC (ref 9–11.5)
PSA SERPL-MCNC: 4.33 NG/ML

## 2023-10-02 ENCOUNTER — PATIENT OUTREACH (OUTPATIENT)
Dept: FAMILY MEDICINE CLINIC | Facility: MEDICAL CENTER | Age: 58
End: 2023-10-02

## 2023-10-02 ENCOUNTER — TELEPHONE (OUTPATIENT)
Dept: FAMILY MEDICINE CLINIC | Facility: MEDICAL CENTER | Age: 58
End: 2023-10-02

## 2023-10-02 NOTE — PROGRESS NOTES
Telephone call to pt and to wife, left messages on both voicemails. Requested return call. Unable to reach letter mailed. Will review next in two weeks and close if no return call from patient.

## 2023-10-02 NOTE — LETTER
Date: 10/02/23    Dear Kiya Mueller,   My name is Marysol Flynn; I am a registered nurse care manager working with [facility] at [facility]. I have not been able to reach you and would like to set a time that I can talk with you over the phone. My work is to help patients that have complex medical conditions get the care they need. This includes patients who may have been in the hospital or emergency room. I have enclosed information for you. Please call me with any questions you may have. I look forward to speaking with you.   Sincerely,  Kamryn Gallardo RN  [phone]  Outpatient Care Manager

## 2023-10-02 NOTE — TELEPHONE ENCOUNTER
Patients INR is 2.1 from 9/30, Instructions? Forwarding to MP and AC as I am not sure who is covering for Eva's patients this morning.

## 2023-10-03 ENCOUNTER — ANTICOAG VISIT (OUTPATIENT)
Dept: FAMILY MEDICINE CLINIC | Facility: MEDICAL CENTER | Age: 58
End: 2023-10-03

## 2023-10-10 ENCOUNTER — TELEPHONE (OUTPATIENT)
Dept: FAMILY MEDICINE CLINIC | Facility: MEDICAL CENTER | Age: 58
End: 2023-10-10

## 2023-10-10 NOTE — TELEPHONE ENCOUNTER
----- Message from [address] [place] [address] sent at 10/10/2023  7:44 AM EDT -----  PSA remains elevated, I still recommend patient follows up with urology for further evaluation as previously advised, referral still in chart.

## 2023-10-16 ENCOUNTER — PATIENT OUTREACH (OUTPATIENT)
Dept: FAMILY MEDICINE CLINIC | Facility: MEDICAL CENTER | Age: 58
End: 2023-10-16

## 2023-10-16 NOTE — PROGRESS NOTES
No response to telephone calls x 2 and unable to reach letter via mail. Will close complex care management episode at this time.

## 2023-11-08 ENCOUNTER — TELEPHONE (OUTPATIENT)
Age: 58
End: 2023-11-08

## 2023-11-12 LAB
INR PPP: 2.1
PROTHROMBIN TIME: 20.8 SEC (ref 9–11.5)

## 2023-11-13 ENCOUNTER — TELEPHONE (OUTPATIENT)
Dept: FAMILY MEDICINE CLINIC | Facility: MEDICAL CENTER | Age: 58
End: 2023-11-13

## 2023-11-13 ENCOUNTER — ANTICOAG VISIT (OUTPATIENT)
Dept: FAMILY MEDICINE CLINIC | Facility: MEDICAL CENTER | Age: 58
End: 2023-11-13

## 2023-11-13 NOTE — TELEPHONE ENCOUNTER
----- Message from [address] [place] [address] sent at 11/12/2023  8:15 PM EST -----  Inr within normal limits. Continue current dose of warfarin recheck inr in 1 month.

## 2023-12-10 DIAGNOSIS — I63.9 CEREBROVASCULAR ACCIDENT (CVA), UNSPECIFIED MECHANISM (HCC): ICD-10-CM

## 2023-12-11 RX ORDER — WARFARIN SODIUM 6 MG/1
TABLET ORAL
Qty: 90 TABLET | Refills: 1 | Status: SHIPPED | OUTPATIENT
Start: 2023-12-11

## 2023-12-18 DIAGNOSIS — I10 ESSENTIAL HYPERTENSION: ICD-10-CM

## 2023-12-18 RX ORDER — CARVEDILOL PHOSPHATE 40 MG/1
40 CAPSULE, EXTENDED RELEASE ORAL DAILY
Qty: 90 CAPSULE | Refills: 1 | Status: SHIPPED | OUTPATIENT
Start: 2023-12-18

## 2023-12-18 NOTE — TELEPHONE ENCOUNTER
** pts wife wanted to note pt is going for bloodwork on Saturday **    Reason for call:   [x] Refill   [] Prior Auth  [] Other:     Office:   [x] PCP/Provider -   [] Specialty/Provider -     Medication: carvedilol 40 mg 1 tablet by mouth daily    Quantity: 90    Pharmacy: [id number] - [place] - [address] [phone]     Does the patient have enough for 3 days?   [] Yes   [x] No - Send as HP to POD

## 2023-12-22 ENCOUNTER — TELEPHONE (OUTPATIENT)
Dept: FAMILY MEDICINE CLINIC | Facility: MEDICAL CENTER | Age: 58
End: 2023-12-22

## 2023-12-22 NOTE — TELEPHONE ENCOUNTER
Left a voicemail for the patient to call back. Please relay the message and/or results below when the patient returns the call.  Thank you!     Patient is overdue for his INR lab draw, when is he planning on going for the labs so we can watch for the results?

## 2023-12-24 LAB
INR PPP: 2.7
PROTHROMBIN TIME: 26.7 SEC (ref 9–11.5)

## 2023-12-26 ENCOUNTER — TELEPHONE (OUTPATIENT)
Dept: FAMILY MEDICINE CLINIC | Facility: MEDICAL CENTER | Age: 58
End: 2023-12-26

## 2023-12-26 ENCOUNTER — ANTICOAG VISIT (OUTPATIENT)
Dept: FAMILY MEDICINE CLINIC | Facility: MEDICAL CENTER | Age: 58
End: 2023-12-26

## 2023-12-26 NOTE — TELEPHONE ENCOUNTER
Voicemail left for the patient with the information below, patient was advised to call the office with any questions or concerns.

## 2023-12-26 NOTE — TELEPHONE ENCOUNTER
----- Message from FORREST Hinds sent at 12/26/2023  1:21 PM EST -----  Regarding: FW:  INR results within normal range. Continue current dose of warfarin and recheck inr in 1 month.  ----- Message -----  From: [facility] Results In  Sent: 12/24/2023   2:30 AM EST  To: FORREST Hinds

## 2024-02-04 LAB
INR PPP: 2.4
PROTHROMBIN TIME: 24.2 SEC (ref 9–11.5)

## 2024-02-28 DIAGNOSIS — I10 ESSENTIAL HYPERTENSION: ICD-10-CM

## 2024-02-28 RX ORDER — LISINOPRIL 10 MG/1
TABLET ORAL
Qty: 90 TABLET | Refills: 2 | Status: SHIPPED | OUTPATIENT
Start: 2024-02-28

## 2024-03-05 DIAGNOSIS — I63.412 CEREBROVASCULAR ACCIDENT (CVA) DUE TO EMBOLISM OF LEFT MIDDLE CEREBRAL ARTERY (HCC): Primary | ICD-10-CM

## 2024-03-17 LAB
INR PPP: 2.4
PROTHROMBIN TIME: 24.2 SEC (ref 9–11.5)

## 2024-03-27 ENCOUNTER — OFFICE VISIT (OUTPATIENT)
Dept: FAMILY MEDICINE CLINIC | Facility: MEDICAL CENTER | Age: 59
End: 2024-03-27
Payer: COMMERCIAL

## 2024-03-27 VITALS
TEMPERATURE: 98 F | DIASTOLIC BLOOD PRESSURE: 82 MMHG | HEIGHT: 72 IN | BODY MASS INDEX: 29.26 KG/M2 | WEIGHT: 216 LBS | HEART RATE: 58 BPM | OXYGEN SATURATION: 99 % | SYSTOLIC BLOOD PRESSURE: 138 MMHG

## 2024-03-27 DIAGNOSIS — Z00.00 ANNUAL PHYSICAL EXAM: Primary | ICD-10-CM

## 2024-03-27 PROCEDURE — 99396 PREV VISIT EST AGE 40-64: CPT

## 2024-03-27 NOTE — PROGRESS NOTES
ADULT ANNUAL PHYSICAL  [facility] [place]    NAME: Jordy Nguyen  AGE: 58 y.o. SEX: male  : 1965     DATE: 3/27/2024     Assessment and Plan:   Reminded patient he is over due for follow up with Cardiology. Advised to call and schedule.   Reminded patient about standing  referral for colonoscopy.  Reminded patient about standing referral for urology due to previously elevated PSA. Strongly advised to schedule consult.   Return in 6 months for f/u, sooner if needed.  All recommended vaccinations declined.  Problem List Items Addressed This Visit    None  Visit Diagnoses     Annual physical exam    -  Primary          Immunizations and preventive care screenings were discussed with patient today. Appropriate education was printed on patient's after visit summary.      Counseling:  Alcohol/drug use: discussed moderation in alcohol intake, the recommendations for healthy alcohol use, and avoidance of illicit drug use.  Dental Health: discussed importance of regular tooth brushing, flossing, and dental visits.  Exercise: the importance of regular exercise/physical activity was discussed. Recommend exercise 3-5 times per week for at least 30 minutes.       Depression Screening and Follow-up Plan: Patient was screened for depression during today's encounter. They screened negative with a PHQ-2 score of 0.        Return in 6 months (on 2024).     Chief Complaint:     No chief complaint on file.     History of Present Illness:     Adult Annual Physical   Patient here for a comprehensive physical exam.   The patient reports no problems.    Diet and Physical Activity  Diet/Nutrition: well balanced diet.   Exercise: walking.      Depression Screening  PHQ-2/9 Depression Screening    Little interest or pleasure in doing things: 0 - not at all  Feeling down, depressed, or hopeless: 0 - not at all  PHQ-2 Score: 0  PHQ-2 Interpretation: Negative depression  "screen       General Health  Sleep: sleeps well.   Hearing: normal - bilateral.  Vision: no vision problems.   Dental: no dental visits for >1 year.        Health  Symptoms include: none       Review of Systems:     Review of Systems   Constitutional: Negative.    HENT: Negative.     Eyes: Negative.    Respiratory: Negative.     Cardiovascular: Negative.    Gastrointestinal: Negative.    Endocrine: Negative.    Genitourinary: Negative.    Musculoskeletal: Negative.    Skin: Negative.    Allergic/Immunologic: Negative.    Neurological: Negative.    Hematological: Negative.    Psychiatric/Behavioral: Negative.        Past Medical History:     Past Medical History:   Diagnosis Date   • Cardiac disease    • Hx of CABG    • Stroke (HCC)       Past Surgical History:     Past Surgical History:   Procedure Laterality Date   • APPENDECTOMY     • CORONARY ARTERY BYPASS GRAFT        Family History:     Family History   Problem Relation Age of Onset   • Hypertension Mother    • Colon cancer Mother    • Hypertension Father       Social History:     Social History     Socioeconomic History   • Marital status: /Civil Union     Spouse name: None   • Number of children: None   • Years of education: None   • Highest education level: None   Occupational History   • None   Tobacco Use   • Smoking status: Former     Current packs/day: 0.00     Average packs/day: 1 pack/day for 30.0 years (30.0 ttl pk-yrs)     Types: Cigarettes     Start date:      Quit date: 2017     Years since quittin.2   • Smokeless tobacco: Former   Vaping Use   • Vaping status: Never Used   Substance and Sexual Activity   • Alcohol use: Yes     Comment: daily beer w/ dinner   • Drug use: Not Currently     Types: \"Crack\" cocaine     Comment: Back in the s   • Sexual activity: Yes     Partners: Female   Other Topics Concern   • None   Social History Narrative   • None     Social Determinants of Health     Financial Resource Strain: Not on file " "  Food Insecurity: Not on file   Transportation Needs: Not on file   Physical Activity: Not on file   Stress: Not on file   Social Connections: Not on file   Intimate Partner Violence: Not on file   Housing Stability: Not on file      Current Medications:     Current Outpatient Medications   Medication Sig Dispense Refill   • aspirin 81 mg chewable tablet Chew 1 tablet daily 30 tablet 0   • carvedilol (COREG CR) 40 MG 24 hr capsule Take 1 capsule (40 mg total) by mouth daily 90 capsule 1   • lisinopril (ZESTRIL) 10 mg tablet take 1 tablet by mouth at bedtime 90 tablet 2   • warfarin (COUMADIN) 1 mg tablet TAKE 1 TABLET BY MOUTH EVERY DAY AS DIRECTED 90 tablet 1   • warfarin (COUMADIN) 6 mg tablet take 1 tablet by mouth once daily 90 tablet 1     No current facility-administered medications for this visit.      Allergies:     Allergies   Allergen Reactions   • Penicillins       Physical Exam:     /82 (BP Location: Right arm, Patient Position: Sitting, Cuff Size: Large)   Pulse 58   Temp 98 °F (36.7 °C) (Temporal)   Ht 5' 11.5\" (1.816 m)   Wt 98 kg (216 lb)   SpO2 99%   BMI 29.71 kg/m²     Physical Exam  Vitals and nursing note reviewed.   Constitutional:       General: He is not in acute distress.     Appearance: Normal appearance. He is not ill-appearing.   HENT:      Head: Normocephalic and atraumatic.      Right Ear: Tympanic membrane and ear canal normal.      Left Ear: Tympanic membrane and ear canal normal.      Nose: Nose normal.      Mouth/Throat:      Mouth: Mucous membranes are moist.      Pharynx: Oropharynx is clear.   Eyes:      General:         Right eye: No discharge.         Left eye: No discharge.      Conjunctiva/sclera: Conjunctivae normal.      Pupils: Pupils are equal, round, and reactive to light.   Neck:      Vascular: No carotid bruit.   Cardiovascular:      Rate and Rhythm: Normal rate and regular rhythm.      Pulses: Normal pulses.      Heart sounds: Normal heart sounds. "   Pulmonary:      Effort: Pulmonary effort is normal.      Breath sounds: Normal breath sounds. No stridor.   Abdominal:      General: Abdomen is flat. Bowel sounds are normal.      Palpations: Abdomen is soft.      Tenderness: There is no abdominal tenderness.   Musculoskeletal:         General: Normal range of motion.      Cervical back: Normal range of motion and neck supple.      Right lower leg: No edema.      Left lower leg: No edema.   Lymphadenopathy:      Cervical: No cervical adenopathy.   Skin:     General: Skin is warm and dry.   Neurological:      General: No focal deficit present.      Mental Status: He is alert and oriented to person, place, and time. Mental status is at baseline.   Psychiatric:         Mood and Affect: Mood normal.         Behavior: Behavior normal.         Thought Content: Thought content normal.          FORREST Hinds  [facility]

## 2024-04-28 LAB
INR PPP: 3.1
PROTHROMBIN TIME: 30.6 SEC (ref 9–11.5)

## 2024-06-13 DIAGNOSIS — I63.9 CEREBROVASCULAR ACCIDENT (CVA), UNSPECIFIED MECHANISM (HCC): ICD-10-CM

## 2024-06-13 DIAGNOSIS — I10 ESSENTIAL HYPERTENSION: ICD-10-CM

## 2024-06-13 RX ORDER — CARVEDILOL PHOSPHATE 40 MG/1
40 CAPSULE, EXTENDED RELEASE ORAL DAILY
Qty: 90 CAPSULE | Refills: 1 | Status: SHIPPED | OUTPATIENT
Start: 2024-06-13

## 2024-06-13 RX ORDER — WARFARIN SODIUM 6 MG/1
6 TABLET ORAL DAILY
Qty: 90 TABLET | Refills: 1 | Status: SHIPPED | OUTPATIENT
Start: 2024-06-13

## 2024-06-16 LAB
INR PPP: 1.4
PROTHROMBIN TIME: 14.6 SEC (ref 9–11.5)

## 2024-07-21 LAB
INR PPP: 4.2
PROTHROMBIN TIME: 40.3 SEC (ref 9–11.5)

## 2024-07-28 LAB
INR PPP: 2
PROTHROMBIN TIME: 19.8 SEC (ref 9–11.5)

## 2024-07-28 NOTE — TELEPHONE ENCOUNTER
----- Message from Yosef Horn DO sent at 1/28/2019  8:02 AM EST -----  INR in range  Continue current dosing for Coumadin  I do not see that patient has scheduled a follow-up with Cardiology as we discussed at his last visit  Never

## 2024-08-18 LAB
INR PPP: 2.5
PROTHROMBIN TIME: 24.8 SEC (ref 9–11.5)

## 2024-08-23 ENCOUNTER — TELEPHONE (OUTPATIENT)
Age: 59
End: 2024-08-23

## 2024-08-23 NOTE — TELEPHONE ENCOUNTER
Spoke with patients spouse, patients spouse would like a call back in regards to lab results. Please advise.

## 2024-09-16 ENCOUNTER — TELEPHONE (OUTPATIENT)
Age: 59
End: 2024-09-16

## 2024-09-16 DIAGNOSIS — Z79.01 LONG TERM CURRENT USE OF ANTICOAGULANT THERAPY: Primary | ICD-10-CM

## 2024-09-16 NOTE — TELEPHONE ENCOUNTER
Patients wife calling for a new standing order for patients  [facility] INR.  The one [facility] has on file has .  Patient uses [facility].  Please call patients wife once new lab order has been entered.

## 2024-10-02 ENCOUNTER — TELEPHONE (OUTPATIENT)
Dept: FAMILY MEDICINE CLINIC | Facility: MEDICAL CENTER | Age: 59
End: 2024-10-02

## 2024-10-14 DIAGNOSIS — Z79.01 LONG TERM CURRENT USE OF ANTICOAGULANT THERAPY: ICD-10-CM

## 2024-10-16 RX ORDER — WARFARIN SODIUM 1 MG/1
TABLET ORAL
Qty: 90 TABLET | Refills: 1 | Status: SHIPPED | OUTPATIENT
Start: 2024-10-16

## 2024-11-27 DIAGNOSIS — I10 ESSENTIAL HYPERTENSION: ICD-10-CM

## 2024-11-29 ENCOUNTER — TELEPHONE (OUTPATIENT)
Dept: CARDIOLOGY CLINIC | Facility: CLINIC | Age: 59
End: 2024-11-29

## 2024-11-29 ENCOUNTER — OFFICE VISIT (OUTPATIENT)
Dept: FAMILY MEDICINE CLINIC | Facility: MEDICAL CENTER | Age: 59
End: 2024-11-29
Payer: COMMERCIAL

## 2024-11-29 VITALS
OXYGEN SATURATION: 98 % | HEIGHT: 72 IN | BODY MASS INDEX: 29.5 KG/M2 | WEIGHT: 217.8 LBS | DIASTOLIC BLOOD PRESSURE: 64 MMHG | TEMPERATURE: 98.1 F | HEART RATE: 79 BPM | RESPIRATION RATE: 18 BRPM | SYSTOLIC BLOOD PRESSURE: 138 MMHG

## 2024-11-29 DIAGNOSIS — I10 ESSENTIAL HYPERTENSION: Primary | ICD-10-CM

## 2024-11-29 DIAGNOSIS — Z12.11 COLON CANCER SCREENING: ICD-10-CM

## 2024-11-29 DIAGNOSIS — R73.9 HYPERGLYCEMIA: ICD-10-CM

## 2024-11-29 DIAGNOSIS — E78.5 DYSLIPIDEMIA: ICD-10-CM

## 2024-11-29 DIAGNOSIS — I25.5 ISCHEMIC CARDIOMYOPATHY: ICD-10-CM

## 2024-11-29 DIAGNOSIS — Z12.5 PROSTATE CANCER SCREENING: ICD-10-CM

## 2024-11-29 DIAGNOSIS — I25.10 CORONARY ARTERY DISEASE INVOLVING NATIVE CORONARY ARTERY OF NATIVE HEART WITHOUT ANGINA PECTORIS: ICD-10-CM

## 2024-11-29 PROCEDURE — 99214 OFFICE O/P EST MOD 30 MIN: CPT

## 2024-11-29 RX ORDER — LISINOPRIL 10 MG/1
10 TABLET ORAL
Qty: 90 TABLET | Refills: 2 | Status: SHIPPED | OUTPATIENT
Start: 2024-11-29

## 2024-11-29 NOTE — PROGRESS NOTES
Name: Jordy Nguyen      : 1965      MRN: [id number]  Encounter Provider: FORREST Hinds  Encounter Date: 2024   Encounter department: [facility] [place]  :  Assessment & Plan  Essential hypertension  Stable with current management.  Continue lisinopril and carvedilol.  Orders:    Comprehensive metabolic panel; Future    Colon cancer screening  Overdue for colonoscopy, new order placed and advised patient to call and schedule.  Orders:    Ambulatory Referral to Gastroenterology; Future    Dyslipidemia  Due for lipid panel.  Declines statin and fenofibrate.  Orders:    Lipid panel; Future    Hyperglycemia  Due for A1c, previously elevated A1c in the past.  Orders:    Hemoglobin A1C; Future    Prostate cancer screening  PSA elevated on previous labs, patient previously advised to follow-up with urology which he has not yet done.  Will recheck PSA now.  Father with history of prostate cancer.  Orders:    PSA, Total Screen; Future    Coronary artery disease involving native coronary artery of native heart without angina pectoris  Overdue for follow-up visit with cardiology, advised patient to call and schedule.       Ischemic cardiomyopathy  Overdue for follow-up visit with cardiology, advised patient to call and schedule.                 History of Present Illness     59-year-old male presents for follow-up.  He is doing well, offers no concerns or complaints.  Declines all recommended vaccinations.  He is overdue for follow-up with cardiology, advised to call and schedule.  He is agreeable to colonoscopy, this has previously been ordered but he has not yet done this, will place new order.      Review of Systems   Constitutional: Negative.    HENT: Negative.     Eyes: Negative.    Respiratory: Negative.     Cardiovascular: Negative.    Gastrointestinal: Negative.    Endocrine: Negative.    Genitourinary: Negative.    Musculoskeletal: Negative.    Skin: Negative.   "  Allergic/Immunologic: Negative.    Neurological: Negative.    Hematological: Negative.    Psychiatric/Behavioral: Negative.          Objective   /64 (BP Location: Left arm, Patient Position: Sitting, Cuff Size: Standard)   Pulse 79   Temp 98.1 °F (36.7 °C) (Temporal)   Resp 18   Ht 5' 11.5\" (1.816 m)   Wt 98.8 kg (217 lb 12.8 oz)   SpO2 98%   BMI 29.95 kg/m²      Physical Exam  Vitals and nursing note reviewed.   Constitutional:       General: He is not in acute distress.     Appearance: Normal appearance. He is not ill-appearing.   HENT:      Head: Normocephalic and atraumatic.   Cardiovascular:      Rate and Rhythm: Normal rate and regular rhythm.      Pulses: Normal pulses.      Heart sounds: Normal heart sounds.   Pulmonary:      Effort: Pulmonary effort is normal.      Breath sounds: Normal breath sounds.   Abdominal:      General: Abdomen is flat. Bowel sounds are normal.      Palpations: Abdomen is soft.      Tenderness: There is no abdominal tenderness. There is no guarding.   Musculoskeletal:         General: Normal range of motion.      Right lower leg: No edema.      Left lower leg: No edema.   Skin:     General: Skin is warm and dry.   Neurological:      General: No focal deficit present.      Mental Status: He is alert and oriented to person, place, and time. Mental status is at baseline.   Psychiatric:         Mood and Affect: Mood normal.         Behavior: Behavior normal.         Thought Content: Thought content normal.       "

## 2024-11-29 NOTE — ASSESSMENT & PLAN NOTE
Stable with current management.  Continue lisinopril and carvedilol.  Orders:    Comprehensive metabolic panel; Future

## 2024-12-13 DIAGNOSIS — I10 ESSENTIAL HYPERTENSION: ICD-10-CM

## 2024-12-13 DIAGNOSIS — I63.9 CEREBROVASCULAR ACCIDENT (CVA), UNSPECIFIED MECHANISM (HCC): ICD-10-CM

## 2024-12-13 RX ORDER — WARFARIN SODIUM 6 MG/1
6 TABLET ORAL DAILY
Qty: 30 TABLET | Refills: 0 | Status: SHIPPED | OUTPATIENT
Start: 2024-12-13

## 2024-12-13 RX ORDER — CARVEDILOL PHOSPHATE 40 MG/1
40 CAPSULE, EXTENDED RELEASE ORAL DAILY
Qty: 90 CAPSULE | Refills: 1 | Status: SHIPPED | OUTPATIENT
Start: 2024-12-13

## 2024-12-13 NOTE — TELEPHONE ENCOUNTER
Patient spouse called to have carvedilol sent to [facility] pharmacy, received from pharmacy earlier today.    Reason for call:   [x] Refill   [] Prior Auth  [] Other:     Office:   [] PCP/Provider -   [x] Specialty/Provider -     Medication: warfarin    Dose/Frequency: 6mg  daily    Quantity: 90 day    Pharmacy: [facility]    Does the patient have enough for 3 days?   [] Yes   [x] No - Send as HP to POD

## 2024-12-22 LAB
ALBUMIN SERPL-MCNC: 4.3 G/DL (ref 3.6–5.1)
ALBUMIN/GLOB SERPL: 1.6 (CALC) (ref 1–2.5)
ALP SERPL-CCNC: 91 U/L (ref 35–144)
ALT SERPL-CCNC: 27 U/L (ref 9–46)
AST SERPL-CCNC: 19 U/L (ref 10–35)
BILIRUB SERPL-MCNC: 0.4 MG/DL (ref 0.2–1.2)
BUN SERPL-MCNC: 17 MG/DL (ref 7–25)
BUN/CREAT SERPL: ABNORMAL (CALC) (ref 6–22)
CALCIUM SERPL-MCNC: 9.1 MG/DL (ref 8.6–10.3)
CHLORIDE SERPL-SCNC: 106 MMOL/L (ref 98–110)
CHOLEST SERPL-MCNC: 244 MG/DL
CHOLEST/HDLC SERPL: 6 (CALC)
CO2 SERPL-SCNC: 23 MMOL/L (ref 20–32)
CREAT SERPL-MCNC: 0.83 MG/DL (ref 0.7–1.3)
GFR/BSA.PRED SERPLBLD CYS-BASED-ARV: 102 ML/MIN/1.73M2
GLOBULIN SER CALC-MCNC: 2.7 G/DL (CALC) (ref 1.9–3.7)
GLUCOSE SERPL-MCNC: 122 MG/DL (ref 65–99)
HDLC SERPL-MCNC: 41 MG/DL
LDLC SERPL CALC-MCNC: 167 MG/DL (CALC)
NONHDLC SERPL-MCNC: 203 MG/DL (CALC)
POTASSIUM SERPL-SCNC: 4.8 MMOL/L (ref 3.5–5.3)
PROT SERPL-MCNC: 7 G/DL (ref 6.1–8.1)
PSA SERPL-MCNC: 6.45 NG/ML
SODIUM SERPL-SCNC: 139 MMOL/L (ref 135–146)
TRIGL SERPL-MCNC: 200 MG/DL

## 2024-12-24 ENCOUNTER — RESULTS FOLLOW-UP (OUTPATIENT)
Dept: FAMILY MEDICINE CLINIC | Facility: MEDICAL CENTER | Age: 59
End: 2024-12-24

## 2024-12-24 DIAGNOSIS — R97.20 ELEVATED PSA: Primary | ICD-10-CM

## 2024-12-24 NOTE — RESULT ENCOUNTER NOTE
Let pt detailed message relaying providers result message and to call back if pt would like to start a statin medication and what pharmacy they would like medication sent to

## 2024-12-30 NOTE — TELEPHONE ENCOUNTER
After discussion with Lisa Mckeon, patient hesitant about coming in for prostate exam to begin with, therefore will leave appointment as scheduled for August  Patient agreeable to labs prior  Will also further discuss lipid lowering medication in office at next visit as he is currently refusing to take any  [facility] called regarding a status update on sending medical record release form to [facility]. [facility] stated they would do that. PSR gave them both the phone number & fax number.

## 2025-01-03 ENCOUNTER — TELEPHONE (OUTPATIENT)
Age: 60
End: 2025-01-03

## 2025-01-03 NOTE — TELEPHONE ENCOUNTER
New Patient    What is the reason for the patient’s appointment?:Patient's wife called stating patient was referred to see Urology for elevated psa 6.45.  Patient is only able to come on 04/18/25 due to  his work schedule.  Patient is scheduled to see [place] at 940 am 04/18/25.    What office location does the patient prefer?:[place]     Does patient have Imaging/Lab Results:    Have patient records been requested?:  If No, are the records showing in Epic:       INSURANCE:   Do we accept the patient's insurance or is the patient Self-Pay?:    Insurance Provider:[facility]  Plan Type/Number:   Member ID#:       HISTORY:   Has the patient had any previous Urologist(s)?:no     Was the patient seen in the ED?:    Has the patient had any outside testing done?:    Does the patient have a personal history of cancer?:no

## 2025-01-14 DIAGNOSIS — I63.9 CEREBROVASCULAR ACCIDENT (CVA), UNSPECIFIED MECHANISM (HCC): ICD-10-CM

## 2025-01-14 RX ORDER — WARFARIN SODIUM 6 MG/1
6 TABLET ORAL DAILY
Qty: 90 TABLET | Refills: 1 | Status: SHIPPED | OUTPATIENT
Start: 2025-01-14

## 2025-02-07 ENCOUNTER — TELEPHONE (OUTPATIENT)
Dept: GASTROENTEROLOGY | Facility: CLINIC | Age: 60
End: 2025-02-07

## 2025-02-07 NOTE — TELEPHONE ENCOUNTER
Spoke to pt to r/s appt. There has been a change in providers schedule. Pt will c/b to schedule when he knows his days off. Pt is a new pt for gastro.

## 2025-03-14 ENCOUNTER — TELEPHONE (OUTPATIENT)
Dept: FAMILY MEDICINE CLINIC | Facility: MEDICAL CENTER | Age: 60
End: 2025-03-14

## 2025-03-30 LAB
INR PPP: 2.3
PROTHROMBIN TIME: 23 SEC (ref 9–11.5)

## 2025-03-31 ENCOUNTER — RESULTS FOLLOW-UP (OUTPATIENT)
Dept: FAMILY MEDICINE CLINIC | Facility: MEDICAL CENTER | Age: 60
End: 2025-03-31

## 2025-04-18 ENCOUNTER — CONSULT (OUTPATIENT)
Dept: UROLOGY | Facility: CLINIC | Age: 60
End: 2025-04-18
Payer: COMMERCIAL

## 2025-04-18 VITALS
HEART RATE: 74 BPM | BODY MASS INDEX: 29.53 KG/M2 | OXYGEN SATURATION: 99 % | SYSTOLIC BLOOD PRESSURE: 152 MMHG | WEIGHT: 218 LBS | HEIGHT: 72 IN | DIASTOLIC BLOOD PRESSURE: 86 MMHG

## 2025-04-18 DIAGNOSIS — R97.20 ELEVATED PSA: ICD-10-CM

## 2025-04-18 PROCEDURE — 99203 OFFICE O/P NEW LOW 30 MIN: CPT

## 2025-04-18 RX ORDER — LORAZEPAM 1 MG/1
TABLET ORAL
Qty: 1 TABLET | Refills: 0 | Status: SHIPPED | OUTPATIENT
Start: 2025-04-18

## 2025-04-18 NOTE — PROGRESS NOTES
Name: Jordy Nguyen      : 1965      MRN: [id number]  Encounter Provider: FORREST Akbar  Encounter Date: 2025   Encounter department: [facility] [place]  :  Assessment & Plan  Elevated PSA  - Family history of prostate cancer in father, who unfortunately passed away last year from this.  -PSA has been elevated since .  His last PSA was 6.45 completed on 24.  -MYCHAL today prostate is smooth, nontender.  I could not appreciate nodules or asymmetry.  His prostate does feel enlarged at about 50 g.  -I have ordered a repeat PSA for patient to complete now, BMP, he will also obtain a multiparametric MRI prostate MRI that we will further evaluate for lesions concerning for prostate cancer that may be used if needed for fusion biopsy of PI-RADS 4 or 5.  We will notify patient of results.  Patient does have claustrophobia, I will send a one-time dose of Ativan for him to take 1 hour prior to his scheduled MRI.  He is aware that he will need a  to and from his MRI.  -All questions addressed.  Please do not hesitate to reach out with further questions or concerns.  Orders:    Ambulatory Referral to Urology    PSA Total, Diagnostic; Future    Basic metabolic panel; Future    MRI prostate multiparametric wo w contrast; Future    LORazepam (ATIVAN) 1 mg tablet; Take 1 tablet 1 hour prior to MRI.  You will need a  to and from MRI.        History of Present Illness   Jordy Nguyen is a 59 y.o. male who presents in consultation for elevated PSA.  Patient has past medical history of CVA, hypertension, coronary artery disease, PVC, dyslipidemia, hyperglycemia.  Patient is on warfarin and aspirin.  Patient's last PSA was 6.45 completed on 24.  His PSA has been between 4.38 and 4.8 over the last 2 years.    Family hx of prostate ca in father, unfortunately passed away last year d/t prostate ca.    Denies dysuria, flank pain, gross hematuria.  Denies recurrent UTIs and  "kidney stones.  Denies previous urologic history or history of  manipulation.    He does have some post void dribbling. States for the most part his stream is fine. Denies frequency and urgency. No nocturia.     PSA Lab Trends:  6.45 (12/21/24)  4.33 (9/30/23)  4.74 (7/15/23)  4.83 (6/10/23)  3.98 (3/18/23)    Review of Systems   Constitutional:  Negative for activity change, fatigue and fever.   HENT:  Negative for congestion, rhinorrhea and sore throat.    Eyes:  Negative for photophobia, redness and visual disturbance.   Respiratory:  Negative for cough, shortness of breath and wheezing.    Cardiovascular:  Negative for chest pain, palpitations and leg swelling.   Gastrointestinal:  Negative for abdominal pain, diarrhea, nausea and vomiting.   Genitourinary:  Negative for difficulty urinating, dysuria, flank pain, frequency, hematuria and urgency.        Occasional postvoid dribbling   Neurological:  Negative for weakness, light-headedness and headaches.          Objective   /86 (BP Location: Left arm, Patient Position: Sitting, Cuff Size: Large)   Pulse 74   Ht 5' 11.5\" (1.816 m)   Wt 98.9 kg (218 lb)   SpO2 99%   BMI 29.98 kg/m²     Physical Exam  Vitals and nursing note reviewed.   Constitutional:       General: He is not in acute distress.     Appearance: He is well-developed.   HENT:      Head: Normocephalic and atraumatic.   Eyes:      Conjunctiva/sclera: Conjunctivae normal.   Pulmonary:      Effort: Pulmonary effort is normal. No respiratory distress.   Genitourinary:     Comments: MYCHAL today prostate is smooth, nontender.  I could not appreciate nodules or asymmetry.  His prostate does feel enlarged at about 50 g.  Musculoskeletal:         General: No swelling.      Cervical back: Neck supple.   Skin:     General: Skin is warm and dry.      Capillary Refill: Capillary refill takes less than 2 seconds.   Neurological:      Mental Status: He is alert.   Psychiatric:         Mood and Affect: " Mood normal.          Results   Lab Results   Component Value Date    PSA 6.45 (H) 12/21/2024    PSA 4.33 (H) 09/30/2023    PSA 4.74 (H) 07/15/2023     Lab Results   Component Value Date    GLUCOSE 113 10/09/2017    CALCIUM 9.1 12/21/2024    K 4.8 12/21/2024    CO2 23 12/21/2024     12/21/2024    BUN 17 12/21/2024    CREATININE 0.83 12/21/2024     Lab Results   Component Value Date    WBC 6.7 05/28/2022    HGB 15.0 05/28/2022    HCT 44.6 05/28/2022    MCV 86.4 05/28/2022     05/28/2022       Office Urine Dip  No results found for this or any previous visit (from the past hour).

## 2025-05-04 LAB
INR PPP: 2.7
PROTHROMBIN TIME: 26.4 SEC (ref 9–11.5)

## 2025-05-05 ENCOUNTER — RESULTS FOLLOW-UP (OUTPATIENT)
Dept: FAMILY MEDICINE CLINIC | Facility: MEDICAL CENTER | Age: 60
End: 2025-05-05

## 2025-05-22 DIAGNOSIS — I63.9 CEREBROVASCULAR ACCIDENT (CVA), UNSPECIFIED MECHANISM (HCC): ICD-10-CM

## 2025-05-22 DIAGNOSIS — Z79.01 LONG TERM CURRENT USE OF ANTICOAGULANT THERAPY: ICD-10-CM

## 2025-05-27 RX ORDER — WARFARIN SODIUM 6 MG/1
6 TABLET ORAL DAILY
Qty: 90 TABLET | Refills: 1 | OUTPATIENT
Start: 2025-05-27

## 2025-05-27 RX ORDER — WARFARIN SODIUM 1 MG/1
TABLET ORAL
Qty: 90 TABLET | Refills: 1 | OUTPATIENT
Start: 2025-05-27

## 2025-06-03 DIAGNOSIS — I63.9 CEREBROVASCULAR ACCIDENT (CVA), UNSPECIFIED MECHANISM (HCC): ICD-10-CM

## 2025-06-03 DIAGNOSIS — Z79.01 LONG TERM CURRENT USE OF ANTICOAGULANT THERAPY: ICD-10-CM

## 2025-06-03 DIAGNOSIS — I63.9 CEREBROVASCULAR ACCIDENT (CVA), UNSPECIFIED MECHANISM (HCC): Primary | ICD-10-CM

## 2025-06-03 DIAGNOSIS — I10 ESSENTIAL HYPERTENSION: ICD-10-CM

## 2025-06-03 NOTE — TELEPHONE ENCOUNTER
Left pt and pt's wife vm to call office to clarify what dosage pt is taking of the Coumadin, so that we can get the correct dosage filled for pt. And INR lab order has been mailed to pt at this time

## 2025-06-03 NOTE — TELEPHONE ENCOUNTER
Medication: carvedilol (COREG CR) 40 MG 24 hr capsule     Dose/Frequency:  TAKE ONE CAPSULE BY MOUTH ONCE DAILY    Quantity: 90    Pharmacy: [facility] [id number] [place] [address] [place], [address]      Office:   [x] PCP/Provider -   [] Speciality/Provider -     Does the patient have enough for 3 days?   [x] Yes   [] No - Send as HP to POD     Medication: warfarin (COUMADIN) 6 mg tablet     Dose/Frequency: Take 1 tablet (6 mg total) by mouth daily     Quantity: 90    Pharmacy:  [id number] - [place], [address]      Office:   [x] PCP/Provider -   [] Speciality/Provider -     Does the patient have enough for 3 days?   [x] Yes   [] No - Send as HP to POD

## 2025-06-03 NOTE — TELEPHONE ENCOUNTER
I have placed a recurring order for INR.  May we confirm with patient how he is taking Coumadin? I currently see 6 mg tablet and 1 mg tablet on file.

## 2025-06-03 NOTE — TELEPHONE ENCOUNTER
Pt wife called requesting PT INR lab orders to be sent the address on file; not to [facility].

## 2025-06-04 ENCOUNTER — TELEPHONE (OUTPATIENT)
Dept: FAMILY MEDICINE CLINIC | Facility: MEDICAL CENTER | Age: 60
End: 2025-06-04

## 2025-06-04 DIAGNOSIS — Z79.01 LONG TERM CURRENT USE OF ANTICOAGULANT THERAPY: ICD-10-CM

## 2025-06-04 DIAGNOSIS — I63.9 CEREBROVASCULAR ACCIDENT (CVA), UNSPECIFIED MECHANISM (HCC): ICD-10-CM

## 2025-06-04 RX ORDER — WARFARIN SODIUM 6 MG/1
6 TABLET ORAL DAILY
Qty: 90 TABLET | Refills: 1 | Status: CANCELLED | OUTPATIENT
Start: 2025-06-04

## 2025-06-04 RX ORDER — CARVEDILOL PHOSPHATE 40 MG/1
40 CAPSULE, EXTENDED RELEASE ORAL DAILY
Qty: 100 CAPSULE | Refills: 0 | Status: SHIPPED | OUTPATIENT
Start: 2025-06-04

## 2025-06-04 RX ORDER — WARFARIN SODIUM 1 MG/1
1 TABLET ORAL SEE ADMIN INSTRUCTIONS
Qty: 90 TABLET | Refills: 1 | Status: SHIPPED | OUTPATIENT
Start: 2025-06-04

## 2025-06-04 RX ORDER — WARFARIN SODIUM 1 MG/1
1 TABLET ORAL SEE ADMIN INSTRUCTIONS
Qty: 90 TABLET | Refills: 1 | Status: SHIPPED | OUTPATIENT
Start: 2025-06-04 | End: 2025-06-04 | Stop reason: SDUPTHER

## 2025-06-04 RX ORDER — WARFARIN SODIUM 6 MG/1
6 TABLET ORAL SEE ADMIN INSTRUCTIONS
Qty: 90 TABLET | Refills: 1 | Status: SHIPPED | OUTPATIENT
Start: 2025-06-04

## 2025-06-04 NOTE — TELEPHONE ENCOUNTER
Returning call with doseage    1mg & 6mg on Tuesday,Thurs & Sat.   6mg on Sunday,Monday,Wednesday & Friday    Pt is out of 1mg

## 2025-06-04 NOTE — TELEPHONE ENCOUNTER
Returning call with doseage    7 mg on Tuesday,Thurs & Sat.   6mg on Sunday,Monday,Wednesday & Friday     Pt is out of 1mg

## 2025-06-15 LAB
INR PPP: 3.1
PROTHROMBIN TIME: 29.8 SEC (ref 9–11.5)

## 2025-06-18 ENCOUNTER — RESULTS FOLLOW-UP (OUTPATIENT)
Dept: FAMILY MEDICINE CLINIC | Facility: MEDICAL CENTER | Age: 60
End: 2025-06-18

## 2025-07-01 DIAGNOSIS — Z79.01 LONG TERM CURRENT USE OF ANTICOAGULANT THERAPY: ICD-10-CM

## 2025-07-08 RX ORDER — WARFARIN SODIUM 1 MG/1
TABLET ORAL
Qty: 270 TABLET | Refills: 1 | Status: SHIPPED | OUTPATIENT
Start: 2025-07-08

## 2025-07-09 DIAGNOSIS — I63.9 CEREBROVASCULAR ACCIDENT (CVA), UNSPECIFIED MECHANISM (HCC): ICD-10-CM

## 2025-07-11 RX ORDER — WARFARIN SODIUM 6 MG/1
6 TABLET ORAL SEE ADMIN INSTRUCTIONS
Qty: 90 TABLET | Refills: 1 | Status: SHIPPED | OUTPATIENT
Start: 2025-07-11

## 2025-07-27 LAB
INR PPP: 2.8
PROTHROMBIN TIME: 27.2 SEC (ref 9–11.5)